# Patient Record
Sex: FEMALE | Race: WHITE | NOT HISPANIC OR LATINO | Employment: FULL TIME | URBAN - METROPOLITAN AREA
[De-identification: names, ages, dates, MRNs, and addresses within clinical notes are randomized per-mention and may not be internally consistent; named-entity substitution may affect disease eponyms.]

---

## 2017-06-11 DIAGNOSIS — F43.22 ADJUSTMENT DISORDER WITH ANXIOUS MOOD: ICD-10-CM

## 2017-06-14 ENCOUNTER — MYC MEDICAL ADVICE (OUTPATIENT)
Dept: FAMILY MEDICINE | Facility: CLINIC | Age: 47
End: 2017-06-14

## 2017-06-14 DIAGNOSIS — F43.22 ADJUSTMENT DISORDER WITH ANXIOUS MOOD: ICD-10-CM

## 2017-07-17 ENCOUNTER — OFFICE VISIT (OUTPATIENT)
Dept: FAMILY MEDICINE | Facility: CLINIC | Age: 47
End: 2017-07-17
Payer: COMMERCIAL

## 2017-07-17 VITALS
BODY MASS INDEX: 26.63 KG/M2 | SYSTOLIC BLOOD PRESSURE: 121 MMHG | TEMPERATURE: 96 F | RESPIRATION RATE: 16 BRPM | DIASTOLIC BLOOD PRESSURE: 79 MMHG | OXYGEN SATURATION: 99 % | WEIGHT: 156 LBS | HEIGHT: 64 IN | HEART RATE: 58 BPM

## 2017-07-17 DIAGNOSIS — F43.22 ADJUSTMENT DISORDER WITH ANXIOUS MOOD: Primary | ICD-10-CM

## 2017-07-17 DIAGNOSIS — Z30.09 ENCOUNTER FOR COUNSELING REGARDING CONTRACEPTION: ICD-10-CM

## 2017-07-17 DIAGNOSIS — M79.604 PAIN OF RIGHT LOWER EXTREMITY DUE TO INJURY: ICD-10-CM

## 2017-07-17 PROCEDURE — 99214 OFFICE O/P EST MOD 30 MIN: CPT | Performed by: NURSE PRACTITIONER

## 2017-07-17 RX ORDER — LEVOTHYROXINE SODIUM 137 UG/1
137 TABLET ORAL DAILY
Qty: 90 TABLET | Refills: 3 | COMMUNITY
Start: 2017-07-17 | End: 2019-06-14

## 2017-07-17 ASSESSMENT — ANXIETY QUESTIONNAIRES
3. WORRYING TOO MUCH ABOUT DIFFERENT THINGS: NOT AT ALL
6. BECOMING EASILY ANNOYED OR IRRITABLE: NOT AT ALL
GAD7 TOTAL SCORE: 1
5. BEING SO RESTLESS THAT IT IS HARD TO SIT STILL: NOT AT ALL
IF YOU CHECKED OFF ANY PROBLEMS ON THIS QUESTIONNAIRE, HOW DIFFICULT HAVE THESE PROBLEMS MADE IT FOR YOU TO DO YOUR WORK, TAKE CARE OF THINGS AT HOME, OR GET ALONG WITH OTHER PEOPLE: NOT DIFFICULT AT ALL
1. FEELING NERVOUS, ANXIOUS, OR ON EDGE: SEVERAL DAYS
7. FEELING AFRAID AS IF SOMETHING AWFUL MIGHT HAPPEN: NOT AT ALL
2. NOT BEING ABLE TO STOP OR CONTROL WORRYING: NOT AT ALL

## 2017-07-17 ASSESSMENT — PATIENT HEALTH QUESTIONNAIRE - PHQ9: 5. POOR APPETITE OR OVEREATING: NOT AT ALL

## 2017-07-17 NOTE — NURSING NOTE
"Chief Complaint   Patient presents with     Anxiety     Recheck Medication     Zoloft       Initial /79  Pulse 58  Temp 96  F (35.6  C) (Tympanic)  Resp 16  Ht 5' 3.75\" (1.619 m)  Wt 156 lb (70.8 kg)  LMP 06/17/2017 (Approximate)  SpO2 99%  BMI 26.99 kg/m2 Estimated body mass index is 26.99 kg/(m^2) as calculated from the following:    Height as of this encounter: 5' 3.75\" (1.619 m).    Weight as of this encounter: 156 lb (70.8 kg).  Medication Reconciliation: complete     Maria E Stern MA      "

## 2017-07-17 NOTE — PROGRESS NOTES
"  SUBJECTIVE:                                                    Belle Tejada is a 46 year old female who presents to clinic today for the following health issues:    Anxiety Follow-Up    Status since last visit: steady    Other associated symptoms:None    Complicating factors:   Significant life event: Yes-  Mother passed away   Current substance abuse: None  Depression symptoms: No  DENI-7 SCORE 1/10/2014 2014 2016   Total Score 0 0 -   Total Score - - 0       GAD7    Amount of exercise or physical activity: 4 days/week for an average of 45 minutes    Problems taking medications regularly: No    Medication side effects: none    Diet: regular (no restrictions)      Divorce was final in 2016.  Her mom  2016.  2 kids: 16 and 12 years old.      Sexually active.  Using condoms.  \"perimenopausal.\"  Periods can be irregular.  Last menstrual period 2017.   Not interested in BCP's.  STD clearance was already done.  She is considering an IUD    Weight.  On weight watchers.  Going well.    Right foot pain.  Started in  after a fall/injury.      Problem list and histories reviewed & adjusted, as indicated.  Additional history: as documented    Patient Active Problem List   Diagnosis     Acquired hypothyroidism     Supervision of high-risk pregnancy of elderly multigravida     Previous  delivery, antepartum condition or complication     CARDIOVASCULAR SCREENING; LDL GOAL LESS THAN 160     Insomnia     Dyspareunia     Orgasm dysfunction     Adjustment disorder with anxious mood     Past Surgical History:   Procedure Laterality Date     C NONSPECIFIC PROCEDURE  2005         C NONSPECIFIC PROCEDURE  10/78    Tonsillectomy       Social History   Substance Use Topics     Smoking status: Never Smoker     Smokeless tobacco: Never Used     Alcohol use Yes      Comment: 5 drinks per week     Family History   Problem Relation Age of Onset     Hypertension Mother      Breast " Cancer Mother      58     Other Cancer Mother      pancreatic CA with mets     Neurologic Disorder Father      migraines + Menieres     Connective Tissue Disorder Father      ichtyosis     Alcohol/Drug Maternal Grandmother      ETOH     Respiratory Maternal Grandfather      emphysema     C.A.D. Paternal Grandmother      Connective Tissue Disorder Son      ichtyosis         Current Outpatient Prescriptions   Medication Sig Dispense Refill     sertraline (ZOLOFT) 50 MG tablet Take 1 tablet (50 mg) by mouth daily 90 tablet 0     levothyroxine (SYNTHROID, LEVOTHROID) 137 MCG tablet Take 1 tablet (137 mcg) by mouth daily 90 tablet 3     desonide (DESOWEN) 0.05 % lotion   1     doxylamine (UNISOM) 25 MG TABS Take 1 tablet (25 mg) by mouth At Bedtime (Patient taking differently: Take 25 mg by mouth PRN) 30 each 11     levothyroxine (SYNTHROID, LEVOTHROID) 125 MCG tablet Take 1 tablet (125 mcg) by mouth daily Taking 137 mcg (Patient not taking: Reported on 7/17/2017) 90 tablet 3     Allergies   Allergen Reactions     No Known Drug Allergies      Recent Labs   Lab Test  08/12/16   0845  05/13/15   1513  10/29/13   1335  05/02/13   1132   LDL  134*  139*   --   102   HDL  73  77   --   84   TRIG  46  47   --   59   ALT   --    --   25   --    CR   --    --   0.80   --    GFRESTIMATED   --    --   78   --    GFRESTBLACK   --    --   >90   --    POTASSIUM   --    --   4.1   --    TSH   --   1.77  2.50   --       BP Readings from Last 3 Encounters:   07/17/17 121/79   08/12/16 116/75   05/13/16 104/78    Wt Readings from Last 3 Encounters:   07/17/17 156 lb (70.8 kg)   08/12/16 160 lb (72.6 kg)   05/13/16 161 lb (73 kg)            Labs reviewed in EPIC    Reviewed and updated as needed this visit by clinical staff       Reviewed and updated as needed this visit by Provider         ROS:  Constitutional, HEENT, cardiovascular, pulmonary, GI, , musculoskeletal, neuro, skin, endocrine and psych systems are negative, except as  "otherwise noted.    OBJECTIVE:     /79  Pulse 58  Temp 96  F (35.6  C) (Tympanic)  Resp 16  Ht 5' 3.75\" (1.619 m)  Wt 156 lb (70.8 kg)  LMP 06/17/2017 (Approximate)  SpO2 99%  BMI 26.99 kg/m2  Body mass index is 26.99 kg/(m^2).   GENERAL APPEARANCE: healthy, alert and no distress. Smiling.   SKIN: warm and dry  PSYCH: mentation appears normal and affect normal/bright.  MS: Ambulatory with a steady gait.   Good eye contact.      ASSESSMENT:   (F43.22) Adjustment disorder with anxious mood  (primary encounter diagnosis)  Comment: Stable  Plan: sertraline (ZOLOFT) 50 MG tablet        Belle is doing quite well on her current medication regimen. Refills are given.  She is due to come into the clinic within the next couple of months for her. Physical with Pap. She will do that.  She will return sooner with any problems.    (Z30.09) Encounter for counseling regarding contraception, IUD  Comment:   Plan: OB/GYN REFERRAL        I discussed with Belle various contraceptive options.  She is leaning toward the intrauterine device.  A referral was given.    (M48.604) Pain of right lower extremity due to injury  Comment:   Plan: PODIATRY/FOOT & ANKLE SURGERY REFERRAL          She will follow-up with podiatry for a consultation other treatment options.      Total: 30 minutes. The patient, greater than 50% of time was spent face to face regarding the 3 above issues, answering questions, and coordinate follow-up care.    STARR Eddy [facility]    "

## 2017-07-17 NOTE — MR AVS SNAPSHOT
After Visit Summary   7/17/2017    Belle Tejada    MRN: [id number]           Patient Information     Date Of Birth          1970        Visit Information        Provider Department      7/17/2017 9:20 AM Pam Rai APRN CNP [facility]        Today's Diagnoses     Adjustment disorder with anxious mood    -  1    Encounter for counseling regarding contraception, IUD        Pain of right lower extremity due to injury           Follow-ups after your visit        Additional Services     OB/GYN REFERRAL       Your provider has referred you to:  G: [facility] [phone]   [email or web address]    Please be aware that coverage of these services is subject to the terms and limitations of your health insurance plan.  Call member services at your health plan with any benefit or coverage questions.      Please bring the following with you to your appointment:    (1) Any X-Rays, CTs or MRIs which have been performed.  Contact the facility where they were done to arrange for  prior to your scheduled appointment.   (2) List of current medications   (3) This referral request   (4) Any documents/labs given to you for this referral            PODIATRY/FOOT & ANKLE SURGERY REFERRAL       Your provider has referred you to: FMG: [facility] [phone]   [email or web address]  [facility]: [facility] [phone]   [email or web address]    Please be aware that coverage of these services is subject to the terms and limitations of your health insurance plan.  Call member services at your health plan with any benefit or coverage questions.      Please bring the following to your appointment:  >>   Any x-rays, CTs or MRIs which have been performed.  Contact the facility where they were done to arrange for  prior to your  "scheduled appointment.    >>   List of current medications   >>   This referral request   >>   Any documents/labs given to you for this referral                  Who to contact     If you have questions or need follow up information about today's clinic visit or your schedule please contact [facility] directly at [phone].  Normal or non-critical lab and imaging results will be communicated to you by MyChart, letter or phone within 4 business days after the clinic has received the results. If you do not hear from us within 7 days, please contact the clinic through Access Systemshart or phone. If you have a critical or abnormal lab result, we will notify you by phone as soon as possible.  Submit refill requests through [employer] or call your pharmacy and they will forward the refill request to us. Please allow 3 business days for your refill to be completed.          Additional Information About Your Visit        MyChart Information     [employer] gives you secure access to your electronic health record. If you see a primary care provider, you can also send messages to your care team and make appointments. If you have questions, please call your primary care clinic.  If you do not have a primary care provider, please call [phone] and they will assist you.        Care EveryWhere ID     This is your Care EveryWhere ID. This could be used by other organizations to access your [place] medical records  [id number]        Your Vitals Were     Pulse Temperature Respirations Height Last Period Pulse Oximetry    58 96  F (35.6  C) (Tympanic) 16 5' 3.75\" (1.619 m) 06/17/2017 (Approximate) 99%    BMI (Body Mass Index)                   26.99 kg/m2            Blood Pressure from Last 3 Encounters:   07/17/17 121/79   08/12/16 116/75   05/13/16 104/78    Weight from Last 3 Encounters:   07/17/17 156 lb (70.8 kg)   08/12/16 160 lb (72.6 kg)   05/13/16 161 lb (73 kg)              We Performed the Following     OB/GYN " REFERRAL     PODIATRY/FOOT & ANKLE SURGERY REFERRAL          Today's Medication Changes          These changes are accurate as of: 7/17/17 10:10 AM.  If you have any questions, ask your nurse or doctor.               These medicines have changed or have updated prescriptions.        Dose/Directions    doxylamine 25 MG Tabs tablet   Commonly known as:  UNISOM   This may have changed:    - when to take this  - additional instructions   Used for:  Insomnia        Dose:  25 mg   Take 1 tablet (25 mg) by mouth At Bedtime   Quantity:  30 each   Refills:  11       * levothyroxine 125 MCG tablet   Commonly known as:  SYNTHROID/LEVOTHROID   This may have changed:  Another medication with the same name was changed. Make sure you understand how and when to take each.   Used for:  Acquired hypothyroidism   Changed by:  Pam Rai APRN CNP        Dose:  125 mcg   Take 1 tablet (125 mcg) by mouth daily Taking 137 mcg   Quantity:  90 tablet   Refills:  3       * levothyroxine 137 MCG tablet   Commonly known as:  SYNTHROID/LEVOTHROID   This may have changed:  additional instructions   Changed by:  Pam Rai APRN CNP        Dose:  137 mcg   Take 1 tablet (137 mcg) by mouth daily . Managed by endocrinology ([facility]).   Quantity:  90 tablet   Refills:  3       * Notice:  This list has 2 medication(s) that are the same as other medications prescribed for you. Read the directions carefully, and ask your doctor or other care provider to review them with you.         Where to get your medicines      These medications were sent to [facility] [id number] - [place], [place] - [address] AT [facility]  [address] [place] [address]     Phone:  [phone]     sertraline 50 MG tablet                Primary Care Provider Office Phone # Fax #    STARR Robert [phone]       [place] [address] [place]  [address]        Equal Access to Services     [facility] : Hadii medardo cedillo candelario Trentali, walizbethda luhueramuha, qacristina genelyndondaniela vo, nola hunt. So [facility] [phone].    ATENCIÓN: Si habla español, tiene a irene disposición servicios gratuitos de asistencia lingüística. Nixon al [phone].    We comply with applicable federal civil rights laws and [place] laws. We do not discriminate on the basis of race, color, national origin, age, disability sex, sexual orientation or gender identity.            Thank you!     Thank you for choosing [facility]  for your care. Our goal is always to provide you with excellent care. Hearing back from our patients is one way we can continue to improve our services. Please take a few minutes to complete the written survey that you may receive in the mail after your visit with us. Thank you!             Your Updated Medication List - Protect others around you: Learn how to safely use, store and throw away your medicines at www.disposemymeds.org.          This list is accurate as of: 7/17/17 10:10 AM.  Always use your most recent med list.                   Brand Name Dispense Instructions for use Diagnosis    desonide 0.05 % lotion    DESOWEN          doxylamine 25 MG Tabs tablet    UNISOM    30 each    Take 1 tablet (25 mg) by mouth At Bedtime    Insomnia       * levothyroxine 125 MCG tablet    SYNTHROID/LEVOTHROID    90 tablet    Take 1 tablet (125 mcg) by mouth daily Taking 137 mcg    Acquired hypothyroidism       * levothyroxine 137 MCG tablet    SYNTHROID/LEVOTHROID    90 tablet    Take 1 tablet (137 mcg) by mouth daily . Managed by endocrinology ([facility]).        sertraline 50 MG tablet    ZOLOFT    90 tablet    Take 1 tablet (50 mg) by mouth daily    Adjustment disorder with anxious mood       * Notice:  This list has 2 medication(s) that are the same as other medications prescribed for you. Read the  directions carefully, and ask your doctor or other care provider to review them with you.

## 2017-07-18 ASSESSMENT — ANXIETY QUESTIONNAIRES: GAD7 TOTAL SCORE: 1

## 2017-07-18 ASSESSMENT — PATIENT HEALTH QUESTIONNAIRE - PHQ9: SUM OF ALL RESPONSES TO PHQ QUESTIONS 1-9: 2

## 2017-07-21 ENCOUNTER — OFFICE VISIT (OUTPATIENT)
Dept: MIDWIFE SERVICES | Facility: CLINIC | Age: 47
End: 2017-07-21
Payer: COMMERCIAL

## 2017-07-21 VITALS
SYSTOLIC BLOOD PRESSURE: 128 MMHG | DIASTOLIC BLOOD PRESSURE: 79 MMHG | HEIGHT: 64 IN | BODY MASS INDEX: 27.13 KG/M2 | WEIGHT: 158.9 LBS | HEART RATE: 60 BPM | TEMPERATURE: 98.1 F

## 2017-07-21 DIAGNOSIS — Z30.430 ENCOUNTER FOR IUD INSERTION: Primary | ICD-10-CM

## 2017-07-21 DIAGNOSIS — Z30.430 ENCOUNTER FOR INSERTION OF INTRAUTERINE CONTRACEPTIVE DEVICE (IUD): ICD-10-CM

## 2017-07-21 LAB — BETA HCG QUAL IFA URINE: NEGATIVE

## 2017-07-21 PROCEDURE — 84703 CHORIONIC GONADOTROPIN ASSAY: CPT | Performed by: ADVANCED PRACTICE MIDWIFE

## 2017-07-21 PROCEDURE — 58300 INSERT INTRAUTERINE DEVICE: CPT | Performed by: ADVANCED PRACTICE MIDWIFE

## 2017-07-21 NOTE — PATIENT INSTRUCTIONS

## 2017-07-21 NOTE — NURSING NOTE
"Chief Complaint   Patient presents with     IUD     Mirena. NDC: [id number] LOT: [id number] EXP: 2020       Initial /79  Pulse 60  Temp 98.1  F (36.7  C) (Oral)  Ht 5' 3.75\" (1.619 m)  Wt 158 lb 14.4 oz (72.1 kg)  LMP 2017 (Approximate)  BMI 27.49 kg/m2 Estimated body mass index is 27.49 kg/(m^2) as calculated from the following:    Height as of this encounter: 5' 3.75\" (1.619 m).    Weight as of this encounter: 158 lb 14.4 oz (72.1 kg).  BP completed using cuff size: regular        The following HM Due: NONE      The following patient reported/Care Every where data was sent to:  P ABSTRACT QUALITY INITIATIVES [[id number]]       patient has appointment for today    Deborah Hughes CMA               "

## 2017-07-21 NOTE — PROGRESS NOTES
Chief Complaint/ History of Present Illness:Belle Tejada is a 46 year old female.   Patient's last menstrual period was 2017 (approximate)..  Today's pregnancy test negative.  She is here for an IUD insertion.  Patient has been given written information.  I have reviewed the risks of the IUD including pregnancy, PID, life threatening infection, perforation, expulsion, cramping, changes in bleeding and ovarian cysts. Benefits of the IUD and alternative family planning methods have been discussed.  Patients questions are answered.  Patient has verbalized understanding of risks and benefits and has signed the consent form.    Allergies   Allergen Reactions     No Known Drug Allergies      Current Outpatient Prescriptions   Medication Sig Dispense Refill     sertraline (ZOLOFT) 50 MG tablet Take 1 tablet (50 mg) by mouth daily 90 tablet 3     levothyroxine (SYNTHROID/LEVOTHROID) 137 MCG tablet Take 1 tablet (137 mcg) by mouth daily . Managed by endocrinology ([facility]). 90 tablet 3     desonide (DESOWEN) 0.05 % lotion   1     doxylamine (UNISOM) 25 MG TABS Take 1 tablet (25 mg) by mouth At Bedtime (Patient taking differently: Take 25 mg by mouth PRN) 30 each 11     [DISCONTINUED] levothyroxine (SYNTHROID, LEVOTHROID) 125 MCG tablet Take 1 tablet (125 mcg) by mouth daily Taking 137 mcg (Patient not taking: Reported on 2017) 90 tablet 3      Past Medical History:   Diagnosis Date     Unspecified hypothyroidism     Hypothyroidism     Past Surgical History:   Procedure Laterality Date     C NONSPECIFIC PROCEDURE  2005         C NONSPECIFIC PROCEDURE  10/78    Tonsillectomy       REVIEW OF SYSTEMS  CONSTITUTIONAL: Denies fever, chills and night sweats  BREASTS:  Denies discharge and lumps.    HEART/LUNGS: Denies dyspnea, wheezing, coughing and chest pain.  HEMATOLOGIC: Denies tendency to bruise and history of blood clots.  GENITOURINARY:  Denies urgency, dysuria and  "hematuria.  NEUROLOGIC:  Denies seizures, weakness and fainting.  PSYCHIATRIC: Negative for changes in mood or affect      EXAM:  /79  Pulse 60  Temp 98.1  F (36.7  C) (Oral)  Ht 5' 3.75\" (1.619 m)  Wt 158 lb 14.4 oz (72.1 kg)  LMP 06/17/2017 (Approximate)  BMI 27.49 kg/m2    Abdomen: soft, nontender, without hepatosplenomegaly or masses  : normal cervix, adnexae, and uterus without masses or discharge  IUD type: Mirena  Lot # [id number]    Procedure:  Uterus assessed for position and is retroverted, cervix very anterior.  Speculum inserted.  Betadine prep of cervix done.  Tenaculum applied at 12 o'clock position and gentle traction was applied to elongate the cervical canal.  Uterus sounded initially to 5.5cm but it didn't feel to get through the internal os. Measured again to 7cm's.  Cervical dilators not used.   IUD inserted in the usual fashion without problems, ie: resistance, severe protracted pain or excessive bleeding.  Tenaculum was removed with scant bleeding from the tenaculum site that was managed with some direct pressure using Millan swabs.  Strings trimmed to 2 cm's.  Patient tolerated the procedure well without any prolonged pain or syncopy.      ASSESSMENT/ PLAN:  (Z30.430) Encounter for IUD insertion  (primary encounter diagnosis)  Comment:   Plan: Beta HCG qual IFA urine, US Pelvic Complete w         Transvaginal    Instructions given to patient regarding checking IUD strings, returning to the clinic if pain or inability to check strings and/or irregular bleeding.  Ultrasound ordered to confirm IUD placement. Pt will schedule today before leaving clinic.  Radha Jama CNM  "

## 2017-07-21 NOTE — MR AVS SNAPSHOT
After Visit Summary   7/21/2017    Belle Tejada    MRN: [id number]           Patient Information     Date Of Birth          1970        Visit Information        Provider Department      7/21/2017 2:00 PM Radha Jama APRN [facility]        Today's Diagnoses     Encounter for IUD insertion    -  1      Care Instructions    What Mirena Users May Expect    What to watch for right after Mirena is placed  Some women may experience uterine cramps, bleeding, and/or dizziness during and right after Mirena is placed. To help minimize the cramps, you may taken ibuprofen 600 mg with food prior to your appointment. These symptoms should improve over the next 24 hours.  Mild cramping may be present for a few days after your placement  As a follow up, you should check your strings on 4 weeks or visit your clinic once in the first 4 to 12 weeks after Mirena is placed to make sure it is in the right position. After that, Mirena can be checked once a year as part of your routine exam.    Please use a back-up method (abstinence or condoms) for 5 days after placement.    Your periods may change  For the first 3 to 6 months, your monthly period may become irregular. You may also have frequent spotting or light bleeding. A few women have heavy bleeding during this time. After your body adjusts, the number of bleeding days is likely to decrease (but may remain irregular), and you may even find that your periods stop altogether for as long as Mirena is in place. Around the end of the third month of use, you may see up to a 75% reduction in the amount of menstrual bleeding. By one year, about 1 out of 5 users may hay have no period at all. At the end of two years, 70% have little or no bleeding. Your periods will return rapidly once Mirena is removed.     Mirena Strings  You may check your own Mirena strings by inserting a finger into the vagina and feeling the strings as they exit the  cervix.  The strings will initially feel firm, like fishing line, but will soften over a few weeks.  After the strings have softened, you or your partner should not be able to feel the strings during intercourse.  If you can feel the IUD, see your healthcare provider to have the position confirmed.  You may use tampons with Mirena in place.    Mirena does not protect against HIV or STDs.  Mirena does not prevent the formation of ovarian cysts.  Mirena does not typically reduce acne or cause weight gain or mood changes.    Please call [facility] at [phone] if you have questions or concerns.    For more information:  [email or web address]            Follow-ups after your visit        Your next 10 appointments already scheduled     Aug 14, 2017  9:20 AM CDT   PHYSICAL with STARR Robert CNP   [facility]    [address] [place] [id number] [phone]            Aug 14, 2017 10:00 AM CDT   New Visit with Fredi Case DPM   [facility]    [address] [place] [address] [phone]              Who to contact     If you have questions or need follow up information about today's clinic visit or your schedule please contact [facility] directly at [phone].  Normal or non-critical lab and imaging results will be communicated to you by MyChart, letter or phone within 4 business days after the clinic has received the results. If you do not hear from us within 7 days, please contact the clinic through MyChart or phone. If you have a critical or abnormal lab result, we will notify you by phone as soon as possible.  Submit refill requests through [employer] or call your pharmacy and they will forward the refill request to us. Please allow 3 business days for your refill to be completed.          Additional Information About Your  "Visit        idemamahart Information     [employer] gives you secure access to your electronic health record. If you see a primary care provider, you can also send messages to your care team and make appointments. If you have questions, please call your primary care clinic.  If you do not have a primary care provider, please call [phone] and they will assist you.        Care EveryWhere ID     This is your Care EveryWhere ID. This could be used by other organizations to access your [facility] medical records  [id number]        Your Vitals Were     Pulse Temperature Height Last Period BMI (Body Mass Index)       60 98.1  F (36.7  C) (Oral) 5' 3.75\" (1.619 m) 06/17/2017 (Approximate) 27.49 kg/m2        Blood Pressure from Last 3 Encounters:   07/21/17 128/79   07/17/17 121/79   08/12/16 116/75    Weight from Last 3 Encounters:   07/21/17 158 lb 14.4 oz (72.1 kg)   07/17/17 156 lb (70.8 kg)   08/12/16 160 lb (72.6 kg)              We Performed the Following     Beta HCG qual IFA urine          Today's Medication Changes          These changes are accurate as of: 7/21/17  2:21 PM.  If you have any questions, ask your nurse or doctor.               These medicines have changed or have updated prescriptions.        Dose/Directions    doxylamine 25 MG Tabs tablet   Commonly known as:  UNISOM   This may have changed:    - when to take this  - additional instructions   Used for:  Insomnia        Dose:  25 mg   Take 1 tablet (25 mg) by mouth At Bedtime   Quantity:  30 each   Refills:  11       levothyroxine 137 MCG tablet   Commonly known as:  SYNTHROID/LEVOTHROID   This may have changed:  Another medication with the same name was removed. Continue taking this medication, and follow the directions you see here.   Changed by:  Pam Rai APRN CNP        Dose:  137 mcg   Take 1 tablet (137 mcg) by mouth daily . Managed by endocrinology ([facility]).   Quantity:  90 tablet   Refills:  3             "    Primary Care Provider Office Phone # Fax #    STARR Robert [phone]       [facility] [address] [place] [address]        Equal Access to Services     DUNIA CONSTANTINO : Hadgilson cedillo hadpano Soomaali, waaxda luqadaha, qaybta kaalmada adeegyada, waxanisa betzaidain hayheavenlyn nathen brewerrhysmirna hunt. So [facility] [phone].    ATENCIÓN: Si habla español, tiene a irene disposición servicios gratuitos de asistencia lingüística. Llame al [phone].    We comply with applicable federal civil rights laws and [place] laws. We do not discriminate on the basis of race, color, national origin, age, disability sex, sexual orientation or gender identity.            Thank you!     Thank you for choosing [facility]  for your care. Our goal is always to provide you with excellent care. Hearing back from our patients is one way we can continue to improve our services. Please take a few minutes to complete the written survey that you may receive in the mail after your visit with us. Thank you!             Your Updated Medication List - Protect others around you: Learn how to safely use, store and throw away your medicines at www.disposemymeds.org.          This list is accurate as of: 7/21/17  2:21 PM.  Always use your most recent med list.                   Brand Name Dispense Instructions for use Diagnosis    desonide 0.05 % lotion    DESOWEN          doxylamine 25 MG Tabs tablet    UNISOM    30 each    Take 1 tablet (25 mg) by mouth At Bedtime    Insomnia       levothyroxine 137 MCG tablet    SYNTHROID/LEVOTHROID    90 tablet    Take 1 tablet (137 mcg) by mouth daily . Managed by endocrinology ([facility]).        sertraline 50 MG tablet    ZOLOFT    90 tablet    Take 1 tablet (50 mg) by mouth daily    Adjustment disorder with anxious mood

## 2017-07-24 ENCOUNTER — RADIANT APPOINTMENT (OUTPATIENT)
Dept: ULTRASOUND IMAGING | Facility: CLINIC | Age: 47
End: 2017-07-24
Attending: ADVANCED PRACTICE MIDWIFE
Payer: COMMERCIAL

## 2017-07-24 ENCOUNTER — OFFICE VISIT (OUTPATIENT)
Dept: MIDWIFE SERVICES | Facility: CLINIC | Age: 47
End: 2017-07-24
Payer: COMMERCIAL

## 2017-07-24 VITALS
DIASTOLIC BLOOD PRESSURE: 73 MMHG | SYSTOLIC BLOOD PRESSURE: 108 MMHG | BODY MASS INDEX: 26.81 KG/M2 | WEIGHT: 155 LBS | TEMPERATURE: 99 F | HEART RATE: 72 BPM

## 2017-07-24 DIAGNOSIS — Z30.431 IUD CHECK UP: Primary | ICD-10-CM

## 2017-07-24 DIAGNOSIS — Z30.430 ENCOUNTER FOR IUD INSERTION: ICD-10-CM

## 2017-07-24 PROCEDURE — 76830 TRANSVAGINAL US NON-OB: CPT | Performed by: OBSTETRICS & GYNECOLOGY

## 2017-07-24 PROCEDURE — 99213 OFFICE O/P EST LOW 20 MIN: CPT | Performed by: ADVANCED PRACTICE MIDWIFE

## 2017-07-24 NOTE — MR AVS SNAPSHOT
After Visit Summary   7/24/2017    Belle Tejada    MRN: [id number]           Patient Information     Date Of Birth          1970        Visit Information        Provider Department      7/24/2017 5:30 PM Lorenza Chiu APRN CNM [facility]        Today's Diagnoses     IUD check up    -  1       Follow-ups after your visit        Your next 10 appointments already scheduled     Aug 14, 2017  9:20 AM CDT   PHYSICAL with STARR Robert CNP   [facility]    [address] [place] [address]   [phone]            Aug 14, 2017 10:00 AM CDT   New Visit with Fredi Case DPM   [address] [id number] [phone]              Who to contact     If you have questions or need follow up information about today's clinic visit or your schedule please contact [facility] directly at [phone].  Normal or non-critical lab and imaging results will be communicated to you by [employer], letter or phone within 4 business days after the clinic has received the results. If you do not hear from us within 7 days, please contact the clinic through [facility] [employer] or phone. If you have a critical or abnormal lab result, we will notify you by phone as soon as possible.  Submit refill requests through [employer] or call your pharmacy and they will forward the refill request to us. Please allow 3 business days for your refill to be completed.          Additional Information About Your Visit        [employer] Information     [employer] gives you secure access to your electronic health record. If you see a primary care provider, you can also send messages to your care team and make appointments. If you have questions, please call your primary care clinic.  If you do not have a primary care provider, please call [phone] and they will  assist you.        Care EveryWhere ID     This is your Care EveryWhere ID. This could be used by other organizations to access your [facility] medical records  [id number]        Your Vitals Were     Pulse Temperature Last Period BMI (Body Mass Index)          72 99  F (37.2  C) (Oral) 06/17/2017 (Approximate) 26.81 kg/m2         Blood Pressure from Last 3 Encounters:   07/24/17 108/73   07/21/17 128/79   07/17/17 121/79    Weight from Last 3 Encounters:   07/24/17 155 lb (70.3 kg)   07/21/17 158 lb 14.4 oz (72.1 kg)   07/17/17 156 lb (70.8 kg)              Today, you had the following     No orders found for display         Today's Medication Changes          These changes are accurate as of: 7/24/17  6:20 PM.  If you have any questions, ask your nurse or doctor.               These medicines have changed or have updated prescriptions.        Dose/Directions    doxylamine 25 MG Tabs tablet   Commonly known as:  UNISOM   This may have changed:    - when to take this  - additional instructions   Used for:  Insomnia        Dose:  25 mg   Take 1 tablet (25 mg) by mouth At Bedtime   Quantity:  30 each   Refills:  11                Primary Care Provider Office Phone # Fax #    STARR Robert [facility] [phone]       [facility] [address] [place] [address]        Equal Access to Services     DUNIA CONSTANTINO AH: Hadii medardo palomino Soramakrishna, waaxda luqadaha, qaybta kaalmada ademaria e, nola howard . So [facility] [phone].    ATENCIÓN: Si habla español, tiene a irene disposición servicios gratuitos de asistencia lingüística. Nixon al [phone].    We comply with applicable federal civil rights laws and [place] laws. We do not discriminate on the basis of race, color, national origin, age, disability sex, sexual orientation or gender identity.            Thank you!     Thank you for choosing [facility]  for your care. Our goal is  always to provide you with excellent care. Hearing back from our patients is one way we can continue to improve our services. Please take a few minutes to complete the written survey that you may receive in the mail after your visit with us. Thank you!             Your Updated Medication List - Protect others around you: Learn how to safely use, store and throw away your medicines at www.disposemymeds.org.          This list is accurate as of: 7/24/17  6:20 PM.  Always use your most recent med list.                   Brand Name Dispense Instructions for use Diagnosis    desonide 0.05 % lotion    DESOWEN          doxylamine 25 MG Tabs tablet    UNISOM    30 each    Take 1 tablet (25 mg) by mouth At Bedtime    Insomnia       levothyroxine 137 MCG tablet    SYNTHROID/LEVOTHROID    90 tablet    Take 1 tablet (137 mcg) by mouth daily . Managed by endocrinology ([facility]).        sertraline 50 MG tablet    ZOLOFT    90 tablet    Take 1 tablet (50 mg) by mouth daily    Adjustment disorder with anxious mood

## 2017-07-24 NOTE — NURSING NOTE
"Chief Complaint   Patient presents with     Prenatal Care       Initial /73  Pulse 72  Temp 99  F (37.2  C) (Oral)  Wt 155 lb (70.3 kg)  LMP 2017 (Approximate)  BMI 26.81 kg/m2 Estimated body mass index is 26.81 kg/(m^2) as calculated from the following:    Height as of 17: 5' 3.75\" (1.619 m).    Weight as of this encounter: 155 lb (70.3 kg).  BP completed using cuff size: regular        The following HM Due: NONE      The following patient reported/Care Every where data was sent to:  P ABSTRACT QUALITY INITIATIVES [27649]       N/a      Angi Long MA               "

## 2017-07-24 NOTE — PROGRESS NOTES
Belle Tejada 46 year old Patient's last menstrual period was 2017 (approximate).      CC: F/U ultrasound for IUD placement    Pt had IUD placed on 2017.  U/S today appears IUD in correct place, also discussed incidental finding of a small fibroid and a small physiologic cyst.  Pt denies pain or discomfort.    ASSESSMENT:  IUD in correct place    PLAN:  RTC for annual sooner with concerns    This was a counseling appt, > 50% of time spent discussing U/S findings.  Time spent with pt 15 mins.    Lorenza Chiu CNM

## 2017-08-14 ENCOUNTER — RADIANT APPOINTMENT (OUTPATIENT)
Dept: GENERAL RADIOLOGY | Facility: CLINIC | Age: 47
End: 2017-08-14
Attending: PODIATRIST
Payer: COMMERCIAL

## 2017-08-14 ENCOUNTER — OFFICE VISIT (OUTPATIENT)
Dept: FAMILY MEDICINE | Facility: CLINIC | Age: 47
End: 2017-08-14
Payer: COMMERCIAL

## 2017-08-14 ENCOUNTER — OFFICE VISIT (OUTPATIENT)
Dept: PODIATRY | Facility: CLINIC | Age: 47
End: 2017-08-14
Payer: COMMERCIAL

## 2017-08-14 VITALS
WEIGHT: 159 LBS | DIASTOLIC BLOOD PRESSURE: 68 MMHG | SYSTOLIC BLOOD PRESSURE: 104 MMHG | TEMPERATURE: 98.4 F | RESPIRATION RATE: 16 BRPM | OXYGEN SATURATION: 98 % | HEART RATE: 60 BPM | BODY MASS INDEX: 27.51 KG/M2

## 2017-08-14 VITALS
HEIGHT: 64 IN | SYSTOLIC BLOOD PRESSURE: 104 MMHG | BODY MASS INDEX: 27.14 KG/M2 | HEART RATE: 60 BPM | OXYGEN SATURATION: 98 % | DIASTOLIC BLOOD PRESSURE: 68 MMHG | RESPIRATION RATE: 16 BRPM | WEIGHT: 159 LBS

## 2017-08-14 DIAGNOSIS — M20.5X1 HALLUX LIMITUS, RIGHT: Primary | ICD-10-CM

## 2017-08-14 DIAGNOSIS — E03.9 ACQUIRED HYPOTHYROIDISM: ICD-10-CM

## 2017-08-14 DIAGNOSIS — Z01.419 ENCOUNTER FOR GYNECOLOGICAL EXAMINATION WITHOUT ABNORMAL FINDING: Primary | ICD-10-CM

## 2017-08-14 PROCEDURE — 73630 X-RAY EXAM OF FOOT: CPT | Mod: RT

## 2017-08-14 PROCEDURE — 99203 OFFICE O/P NEW LOW 30 MIN: CPT | Performed by: PODIATRIST

## 2017-08-14 PROCEDURE — 99396 PREV VISIT EST AGE 40-64: CPT | Performed by: NURSE PRACTITIONER

## 2017-08-14 NOTE — PROGRESS NOTES
PATIENT HISTORY:  Belle Tejada is a 46 year old female who presents to clinic for R 1st MPJ pain with activity.  Pt reports stubbing the toe and injuring after a jump 8 yrs ago in [place].  Chiropractic care has helped.  Pt is unsure if the toe was broken.  -7/10 pain.  No recent injury.    Review of Systems:  Patient denies fever, chills, rash, wound, stiffness, limping, numbness, weakness, heart burn, blood in stool, chest pain with activity, calf pain when walking, shortness of breath with activity, chronic cough, easy bleeding/bruising, swelling of ankles, excessive thirst, fatigue, depression, anxiety.       PAST MEDICAL HISTORY:   Past Medical History:   Diagnosis Date     Unspecified hypothyroidism     Hypothyroidism        PAST SURGICAL HISTORY:   Past Surgical History:   Procedure Laterality Date     C NONSPECIFIC PROCEDURE  2005         C NONSPECIFIC PROCEDURE  10/78    Tonsillectomy        MEDICATIONS:   Current Outpatient Prescriptions:      levonorgestrel (MIRENA, 52 MG,) 20 MCG/24HR IUD, 1 each by Intrauterine route once, Disp: , Rfl:      sertraline (ZOLOFT) 50 MG tablet, Take 1 tablet (50 mg) by mouth daily, Disp: 90 tablet, Rfl: 3     levothyroxine (SYNTHROID/LEVOTHROID) 137 MCG tablet, Take 1 tablet (137 mcg) by mouth daily . Managed by endocrinology ([facility]")., Disp: 90 tablet, Rfl: 3     desonide (DESOWEN) 0.05 % lotion, , Disp: , Rfl: 1     doxylamine (UNISOM) 25 MG TABS, Take 1 tablet (25 mg) by mouth At Bedtime (Patient taking differently: Take 25 mg by mouth PRN), Disp: 30 each, Rfl: 11     ALLERGIES:    Allergies   Allergen Reactions     No Known Drug Allergies         SOCIAL HISTORY:   Social History     Social History     Marital status:      Spouse name: N/A     Number of children: N/A     Years of education: N/A     Occupational History     Not on file.     Social History Main Topics     Smoking status: Never Smoker     Smokeless tobacco:  Never Used     Alcohol use Yes      Comment: 5 drinks per week     Drug use: No     Sexual activity: Yes     Partners: Male     Birth control/ protection: Rhythm     Other Topics Concern      Service No     Blood Transfusions No     Caffeine Concern No     Occupational Exposure No     Hobby Hazards No     Sleep Concern No     Stress Concern No     Weight Concern No     Special Diet No     Back Care No     Exercise Yes     sporadic     Bike Helmet Yes     Seat Belt Yes     Self-Exams Yes     EOM     Parent/Sibling W/ Cabg, Mi Or Angioplasty Before 65f 55m? No     Social History Narrative    Caffeine intake/servings daily - 6    Calcium intake/servings daily - 4    Exercise 3 times weekly - describe biking, walking, jogging    Sunscreen used - No    Seatbelts used - Yes    Guns stored in the home - No    Self Breast Exam - No    Pap test up to date -  Yes    Eye exam up to date -  Yes    Dental exam up to date -  Yes    DEXA scan up to date -  Not Applicable    Flex Sig/Colonoscopy up to date -  Not Applicable    Mammography up to date -  Not Applicable    Immunizations reviewed and up to date - Yes    Abuse: Current or Past (Physical, Sexual or Emotional) - No    Do you feel safe in your environment - Yes    Do you cope well with stress - Yes    Do you suffer from insomnia - Yes-uses tylenlol pm or unisom    Last updated by: Joanne Gilligan 8/29/08        FAMILY HISTORY:   Family History   Problem Relation Age of Onset     Hypertension Mother      Breast Cancer Mother      58     Other Cancer Mother      pancreatic CA with mets     Neurologic Disorder Father      migraines + Menieres     Connective Tissue Disorder Father      ichtyosis     Alcohol/Drug Maternal Grandmother      ETOH     Respiratory Maternal Grandfather      emphysema     C.A.D. Paternal Grandmother      Other - See Comments Son      ichthyosis        EXAM:Vitals: /68  Pulse 60  Temp 98.4  F (36.9  C) (Tympanic)  Resp 16  Wt 159 lb  (72.1 kg)  LMP 08/03/2017  SpO2 98%  BMI 27.51 kg/m2  BMI= Body mass index is 27.51 kg/(m^2).    General appearance: Patient is alert and fully cooperative with history & exam.  No sign of distress is noted during the visit.     Psychiatric: Affect is pleasant & appropriate.  Patient appears motivated to improve health.     Respiratory: Breathing is regular & unlabored while sitting.     HEENT: Hearing is intact to spoken word.  Speech is clear.  No gross evidence of visual impairment that would impact ambulation.     Dermatologic: Skin is intact to both lower extremities without significant lesions, rash or abrasion.  No paronychia or evidence of soft tissue infection is noted.     Vascular: DP & PT pulses are intact & regular bilaterally.  No significant edema or varicosities noted.  CFT and skin temperature are normal to both lower extremities.     Neurologic: Lower extremity sensation is intact to light touch.  No evidence of weakness or contracture in the lower extremities.  No evidence of neuropathy.     Musculoskeletal: R 1st MPJ ROM limited with dorsal spurring noted.  Joint pain with end dorsiflexion.  Patient is ambulatory without assistive device or brace.  No gross ankle deformity noted.  No foot or ankle joint effusion is noted.    XRs of R foot reviewed with pt.  1st MPJ narrowing, spurring, loose body.     ASSESSMENT: R hallux limitus     PLAN:  Reviewed patient's chart in epic.  Discussed condition and treatment options including pros and cons.    Surgical and non-surgical treatment options for hallux limitus were discussed.  This includes my philosophy about different procedures including cheilectomy, osteotomy and fusion.  I explained how fusion is for late stage treatment of advanced arthritis.  There is no certainty that the non-fusion procedures will improve joint pain that is caused by arthritis.  I explained that hallux limitus is oftentimes surgically treated in a staged manner.  This  means that additional surgery may be necessary over time.  Non-operative treatments like stiff soles, orthotics, injection and NSAIDs were discussed.  Shoes that provide extra room for the enlarged joint should be helpful.  I would anticipate somewhat short term benefit from joint injection.    Risks and potential complications of treatment were discussed including side effects from joint injection, limited benefit from orthotics due to the enlarged joint and the progressive nature of this condition.      Pt will proceed with nonoperative measures for now.  Handout given.  F/u prn.      Fredi Case DPM, FACFAS    Weight management plan: Patient was referred to their PCP to discuss a diet and exercise plan.

## 2017-08-14 NOTE — NURSING NOTE
"Chief Complaint   Patient presents with     Musculoskeletal Problem     right foot       Initial /68  Pulse 60  Temp 98.4  F (36.9  C) (Tympanic)  Resp 16  Wt 159 lb (72.1 kg)  LMP 08/03/2017  SpO2 98%  BMI 27.51 kg/m2 Estimated body mass index is 27.51 kg/(m^2) as calculated from the following:    Height as of an earlier encounter on 8/14/17: 5' 3.75\" (1.619 m).    Weight as of this encounter: 159 lb (72.1 kg).  Medication Reconciliation: unable or not appropriate to perform       Katalina Stern MA      "

## 2017-08-14 NOTE — PROGRESS NOTES
SUBJECTIVE:   CC: Belle Tejada is an 46 year old woman who presents for preventive health visit.     Physical   Annual:     Getting at least 3 servings of Calcium per day::  Yes    Bi-annual eye exam::  Yes    Dental care twice a year::  Yes    Sleep apnea or symptoms of sleep apnea::  None    Diet::  Regular (no restrictions)    Frequency of exercise::  2-3 days/week    Duration of exercise::  45-60 minutes    Taking medications regularly::  Yes    Medication side effects::  None    Additional concerns today::  No        Today's PHQ-2 Score:   PHQ-2 ( 1999 [employer]) 8/14/2017   Q1: Little interest or pleasure in doing things 0   Q2: Feeling down, depressed or hopeless 0   PHQ-2 Score 0   Q1: Little interest or pleasure in doing things Not at all   Q2: Feeling down, depressed or hopeless Not at all   PHQ-2 Score 0       Abuse: Current or Past(Physical, Sexual or Emotional)- No  Do you feel safe in your environment - Yes    Social History   Substance Use Topics     Smoking status: Never Smoker     Smokeless tobacco: Never Used     Alcohol use Yes      Comment: 5 drinks per week          Standardized Alcohol Screening Questionnaire  AUDIT   Questions 0 1 2 3 4 Score   1. How often do you have a drink  containing alcohol? Never Monthly or less 2 to 4  times a  month 2 to 3  times a  week 4 or more  times a  week  4   2. How many drinks containing alcohol  do you have on a typical day when you are drinking? 1 or 2 3 or 4 5 or 6 7 to 9 10 or more  0   3. How often do you have more than five  or more drinks on one occasion? Never Less  than  monthly Monthly Weekly Daily or  almost  daily  0   4. How often during the last year have  you found that you were not able to stop drinking once you had started? Never Less  than  monthly Monthly Weekly Daily or  almost  daily  0   5. How often during the last year have  you failed to do what was normally expected of you because of drinking? Never Less  than  monthly Monthly  Weekly Daily or  almost  daily  0   6. How often during the last year have  you needed a first drink in the morning to get yourself going after a heavy drinking session? Never Less  than  monthly Monthly Weekly Daily or  almost  daily  0   7. How often during the last year have you had a feeling of guilt or remorse after drinking? Never Less  than  monthly Monthly Weekly Daily or  almost  daily  0   8. How often during the last year have  you been unable to remember what happened the night before because of your drinking? Never Less  than  monthly Monthly Weekly Daily or  almost  daily  0   9. Have you or someone else been  injured because of your drinking? No  Yes, but not in the last year  Yes,  during the  last year  0   10. Has a relative, friend, doctor or other health care worker been concerned about your drinking or suggested you cut down? No  Yes, but not in the last year  Yes,  during the  last year  0   Total  4   Scoring: A score of 7 for adult men is an indication of hazardous drinking (risk for physical or physiological harm); a score of 8 or more is an indication of an alcohol use disorder. A score of 5 or more for adult women  is an indication of hazardous drinking or an alcohol use disorder.         Reviewed orders with patient.  Reviewed health maintenance and updated orders accordingly - Yes  Labs reviewed in EPIC  BP Readings from Last 3 Encounters:   17 104/68   17 108/73   17 128/79    Wt Readings from Last 3 Encounters:   17 159 lb (72.1 kg)   17 155 lb (70.3 kg)   17 158 lb 14.4 oz (72.1 kg)                  Patient Active Problem List   Diagnosis     Acquired hypothyroidism     CARDIOVASCULAR SCREENING; LDL GOAL LESS THAN 160     Insomnia     Dyspareunia     Orgasm dysfunction     Adjustment disorder with anxious mood     Past Surgical History:   Procedure Laterality Date     C NONSPECIFIC PROCEDURE  2005         C NONSPECIFIC PROCEDURE   10/78    Tonsillectomy       Social History   Substance Use Topics     Smoking status: Never Smoker     Smokeless tobacco: Never Used     Alcohol use Yes      Comment: 5 drinks per week     Family History   Problem Relation Age of Onset     Hypertension Mother      Breast Cancer Mother      58     Other Cancer Mother      pancreatic CA with mets     Neurologic Disorder Father      migraines + Menieres     Connective Tissue Disorder Father      ichtyosis     Alcohol/Drug Maternal Grandmother      ETOH     Respiratory Maternal Grandfather      emphysema     C.A.D. Paternal Grandmother      Connective Tissue Disorder Son      ichtyosis         Current Outpatient Prescriptions   Medication Sig Dispense Refill     levonorgestrel (MIRENA, 52 MG,) 20 MCG/24HR IUD 1 each by Intrauterine route once       sertraline (ZOLOFT) 50 MG tablet Take 1 tablet (50 mg) by mouth daily 90 tablet 3     levothyroxine (SYNTHROID/LEVOTHROID) 137 MCG tablet Take 1 tablet (137 mcg) by mouth daily . Managed by endocrinology ([facility]). 90 tablet 3     desonide (DESOWEN) 0.05 % lotion   1     doxylamine (UNISOM) 25 MG TABS Take 1 tablet (25 mg) by mouth At Bedtime (Patient taking differently: Take 25 mg by mouth PRN) 30 each 11     Allergies   Allergen Reactions     No Known Drug Allergies      Recent Labs   Lab Test  08/12/16   0845  05/13/15   1513  10/29/13   1335  05/02/13   1132   LDL  134*  139*   --   102   HDL  73  77   --   84   TRIG  46  47   --   59   ALT   --    --   25   --    CR   --    --   0.80   --    GFRESTIMATED   --    --   78   --    GFRESTBLACK   --    --   >90   --    POTASSIUM   --    --   4.1   --    TSH   --   1.77  2.50   --               Patient under age 50, mutual decision reflected in health maintenance.        Pertinent mammograms are reviewed under the imaging tab.  History of abnormal Pap smear:   Last 3 Pap Results:   PAP (no units)   Date Value   08/12/2016 OTHER-NIL, See Result  "  05/02/2013 NIL   08/29/2008 NIL     Mirena was inserted  Reviewed and updated as needed this visit by clinical staff  Tobacco  Allergies  Med Hx  Surg Hx  Fam Hx  Soc Hx        Reviewed and updated as needed this visit by Provider          Thyroid:  Taking her meds as prescribed.  Requesting labs for follow up today.     ROS:  C: NEGATIVE for fever, chills, change in weight  I: NEGATIVE for worrisome rashes, moles or lesions  E: NEGATIVE for vision changes or irritation  ENT: NEGATIVE for ear, mouth and throat problems  R: NEGATIVE for significant cough or SOB  B: NEGATIVE for masses, tenderness or discharge  CV: NEGATIVE for chest pain, palpitations or peripheral edema  GI: NEGATIVE for nausea, abdominal pain, heartburn, or change in bowel habits  : NEGATIVE for unusual urinary or vaginal symptoms. Periods are irregular. See HPI.  M: NEGATIVE for significant arthralgias or myalgia  N: NEGATIVE for weakness, dizziness or paresthesias  E: NEGATIVE for temperature intolerance, skin/hair changes  P: NEGATIVE for changes in mood or affect     OBJECTIVE:   /68  Pulse 60  Resp 16  Ht 5' 3.75\" (1.619 m)  Wt 159 lb (72.1 kg)  LMP 08/03/2017  SpO2 98%  Breastfeeding? No  BMI 27.51 kg/m2  EXAM:  GENERAL: healthy, alert and no distress  EYES: Eyes grossly normal to inspection, PERRL and conjunctivae and sclerae normal  HENT: ear canals and TM's normal, nose and mouth without ulcers or lesions  NECK: no adenopathy, no asymmetry, masses, or scars and thyroid normal to palpation  RESP: lungs clear to auscultation - no rales, rhonchi or wheezes  BREAST: normal without masses, tenderness or nipple discharge and no palpable axillary masses or adenopathy  CV: regular rate and rhythm, normal S1 S2, no S3 or S4, no murmur, click or rub, no peripheral edema and peripheral pulses strong  ABDOMEN: soft, nontender, no hepatosplenomegaly, no masses and bowel sounds normal   (female): normal female external " "genitalia, normal urethral meatus, vaginal mucosa pink, moist, well rugated, and normal cervix/adnexa/uterus without masses or discharge  MS: no gross musculoskeletal defects noted, no edema  SKIN: no suspicious lesions or rashes  NEURO: Normal strength and tone, mentation intact and speech normal  PSYCH: mentation appears normal, affect normal/bright    ASSESSMENT/PLAN:   (Z01.419) Encounter for gynecological examination without abnormal finding  (primary encounter diagnosis)  Comment:   Plan: Lipid panel reflex to direct LDL, Hemoglobin,         Glucose, CANCELED: Lipid panel reflex to direct        LDL, CANCELED: Hemoglobin, CANCELED: Glucose            (E03.9) Acquired hypothyroidism  Comment:   Plan: **TSH with free T4 reflex FUTURE 1yr, CANCELED:        TSH WITH FREE T4 REFLEX        Labs pending.       COUNSELING:  Reviewed preventive health counseling, as reflected in patient instructions     reports that she has never smoked. She has never used smokeless tobacco.    Estimated body mass index is 27.51 kg/(m^2) as calculated from the following:    Height as of this encounter: 5' 3.75\" (1.619 m).    Weight as of this encounter: 159 lb (72.1 kg).         Counseling Resources:  ATP IV Guidelines  Pooled Cohorts Equation Calculator  Breast Cancer Risk Calculator  FRAX Risk Assessment  ICSI Preventive Guidelines  Dietary Guidelines for Americans, 2010  USDA's MyPlate  ASA Prophylaxis  Lung CA Screening    STARR Eddy CNP  [facility] for HPI/ROS submitted by the patient on 8/14/2017   PHQ-2 Score: 0    "

## 2017-08-14 NOTE — MR AVS SNAPSHOT
After Visit Summary   8/14/2017    Belle Tejada    MRN: [id number]           Patient Information     Date Of Birth          1970        Visit Information        Provider Department      8/14/2017 9:20 AM Pam Rai APRN [facility]        Today's Diagnoses     Encounter for gynecological examination without abnormal finding    -  1    Acquired hypothyroidism          Care Instructions      Preventive Health Recommendations  Female Ages 40 to 49    Yearly exam:     See your health care provider every year in order to  1. Review health changes.   2. Discuss preventive care.    3. Review your medicines if your doctor prescribed any.      Get a Pap test every three years (unless you have an abnormal result and your provider advises testing more often).      If you get Pap tests with HPV test, you only need to test every 5 years, unless you have an abnormal result. You do not need a Pap test if your uterus was removed (hysterectomy) and you have not had cancer.      You should be tested each year for STDs (sexually transmitted diseases), if you're at risk.       Ask your doctor if you should have a mammogram.      Have a colonoscopy (test for colon cancer) if someone in your family has had colon cancer or polyps before age 50.       Have a cholesterol test every 5 years.       Have a diabetes test (fasting glucose) after age 45. If you are at risk for diabetes, you should have this test every 3 years.    Shots: Get a flu shot each year. Get a tetanus shot every 10 years.     Nutrition:     Eat at least 5 servings of fruits and vegetables each day.    Eat whole-grain bread, whole-wheat pasta and brown rice instead of white grains and rice.    Talk to your provider about Calcium and Vitamin D.     Lifestyle    Exercise at least 150 minutes a week (an average of 30 minutes a day, 5 days a week). This will help you control your weight and prevent disease.    Limit  "alcohol to one drink per day.    No smoking.     Wear sunscreen to prevent skin cancer.    See your dentist every six months for an exam and cleaning.          Follow-ups after your visit        Your next 10 appointments already scheduled     Aug 14, 2017 10:00 AM CDT   New Visit with Fredi Case DPM   [facility] ([facility])    [address] [address] [phone]              Who to contact     If you have questions or need follow up information about today's clinic visit or your schedule please contact [facility] directly at [phone].  Normal or non-critical lab and imaging results will be communicated to you by MyChart, letter or phone within 4 business days after the clinic has received the results. If you do not hear from us within 7 days, please contact the clinic through [employer] or phone. If you have a critical or abnormal lab result, we will notify you by phone as soon as possible.  Submit refill requests through [employer] or call your pharmacy and they will forward the refill request to us. Please allow 3 business days for your refill to be completed.          Additional Information About Your Visit        MyChart Information     [employer] gives you secure access to your electronic health record. If you see a primary care provider, you can also send messages to your care team and make appointments. If you have questions, please call your primary care clinic.  If you do not have a primary care provider, please call [phone] and they will assist you.        Care EveryWhere ID     This is your Care EveryWhere ID. This could be used by other organizations to access your [place] medical records  [id number]        Your Vitals Were     Pulse Respirations Height Last Period Pulse Oximetry Breastfeeding?    60 16 5' 3.75\" (1.619 m) 08/03/2017 98% No    BMI (Body Mass Index)                   27.51 kg/m2            Blood " Pressure from Last 3 Encounters:   08/14/17 104/68   07/24/17 108/73   07/21/17 128/79    Weight from Last 3 Encounters:   08/14/17 159 lb (72.1 kg)   07/24/17 155 lb (70.3 kg)   07/21/17 158 lb 14.4 oz (72.1 kg)              We Performed the Following     Glucose     Hemoglobin     Lipid panel reflex to direct LDL     TSH WITH FREE T4 REFLEX          Today's Medication Changes          These changes are accurate as of: 8/14/17  9:47 AM.  If you have any questions, ask your nurse or doctor.               These medicines have changed or have updated prescriptions.        Dose/Directions    doxylamine 25 MG Tabs tablet   Commonly known as:  UNISOM   This may have changed:    - when to take this  - additional instructions   Used for:  Insomnia        Dose:  25 mg   Take 1 tablet (25 mg) by mouth At Bedtime   Quantity:  30 each   Refills:  11                Primary Care Provider Office Phone # Fax #    STARR Robert [facility] [phone] [address] [place] [address]        Equal Access to Services     [facility] Center[facility] : Hadii medardo ku hadasho Soomaali, waaxda luqadaha, qaybta kaalmada adeegyadaniela, nola howard . So [facility] [phone].    ATENCIÓN: Si habla español, tiene a irene disposición servicios gratuitos de asistencia lingüística. [facility] [phone].    We comply with applicable federal civil rights laws and [place] laws. We do not discriminate on the basis of race, color, national origin, age, disability sex, sexual orientation or gender identity.            Thank you!     Thank you for choosing [facility]  for your care. Our goal is always to provide you with excellent care. Hearing back from our patients is one way we can continue to improve our services. Please take a few minutes to complete the written survey that you may receive in the mail after your visit with us. Thank you!             Your Updated Medication List -  Protect others around you: Learn how to safely use, store and throw away your medicines at www.disposemymeds.org.          This list is accurate as of: 8/14/17  9:47 AM.  Always use your most recent med list.                   Brand Name Dispense Instructions for use Diagnosis    desonide 0.05 % lotion    DESOWEN          doxylamine 25 MG Tabs tablet    UNISOM    30 each    Take 1 tablet (25 mg) by mouth At Bedtime    Insomnia       levothyroxine 137 MCG tablet    SYNTHROID/LEVOTHROID    90 tablet    Take 1 tablet (137 mcg) by mouth daily . Managed by endocrinology ([facility]).        MIRENA (52 MG) 20 MCG/24HR IUD   Generic drug:  levonorgestrel      1 each by Intrauterine route once        sertraline 50 MG tablet    ZOLOFT    90 tablet    Take 1 tablet (50 mg) by mouth daily    Adjustment disorder with anxious mood

## 2017-08-14 NOTE — LETTER
2017         RE: Belle Tejada  [address]  [place] [address]        Dear Colleague,    Thank you for referring your patient, Belle Tejada, to the [facility]. Please see a copy of my visit note below.    PATIENT HISTORY:  Belle Tejada is a 46 year old female who presents to clinic for R 1st MPJ pain with activity.  Pt reports stubbing the toe and injuring after a jump 8 yrs ago in [place].  Chiropractic care has helped.  Pt is unsure if the toe was broken.  -7/10 pain.  No recent injury.    Review of Systems:  Patient denies fever, chills, rash, wound, stiffness, limping, numbness, weakness, heart burn, blood in stool, chest pain with activity, calf pain when walking, shortness of breath with activity, chronic cough, easy bleeding/bruising, swelling of ankles, excessive thirst, fatigue, depression, anxiety.       PAST MEDICAL HISTORY:   Past Medical History:   Diagnosis Date     Unspecified hypothyroidism     Hypothyroidism        PAST SURGICAL HISTORY:   Past Surgical History:   Procedure Laterality Date     C NONSPECIFIC PROCEDURE  2005         C NONSPECIFIC PROCEDURE  10/78    Tonsillectomy        MEDICATIONS:   Current Outpatient Prescriptions:      levonorgestrel (MIRENA, 52 MG,) 20 MCG/24HR IUD, 1 each by Intrauterine route once, Disp: , Rfl:      sertraline (ZOLOFT) 50 MG tablet, Take 1 tablet (50 mg) by mouth daily, Disp: 90 tablet, Rfl: 3     levothyroxine (SYNTHROID/LEVOTHROID) 137 MCG tablet, Take 1 tablet (137 mcg) by mouth daily . Managed by endocrinology ([facility])., Disp: 90 tablet, Rfl: 3     desonide (DESOWEN) 0.05 % lotion, , Disp: , Rfl: 1     doxylamine (UNISOM) 25 MG TABS, Take 1 tablet (25 mg) by mouth At Bedtime (Patient taking differently: Take 25 mg by mouth PRN), Disp: 30 each, Rfl: 11     ALLERGIES:    Allergies   Allergen Reactions     No Known Drug Allergies         SOCIAL HISTORY:   Social History      Social History     Marital status:      Spouse name: N/A     Number of children: N/A     Years of education: N/A     Occupational History     Not on file.     Social History Main Topics     Smoking status: Never Smoker     Smokeless tobacco: Never Used     Alcohol use Yes      Comment: 5 drinks per week     Drug use: No     Sexual activity: Yes     Partners: Male     Birth control/ protection: Rhythm     Other Topics Concern      Service No     Blood Transfusions No     Caffeine Concern No     Occupational Exposure No     Hobby Hazards No     Sleep Concern No     Stress Concern No     Weight Concern No     Special Diet No     Back Care No     Exercise Yes     sporadic     Bike Helmet Yes     Seat Belt Yes     Self-Exams Yes     EOM     Parent/Sibling W/ Cabg, Mi Or Angioplasty Before 65f 55m? No     Social History Narrative    Caffeine intake/servings daily - 6    Calcium intake/servings daily - 4    Exercise 3 times weekly - describe biking, walking, jogging    Sunscreen used - No    Seatbelts used - Yes    Guns stored in the home - No    Self Breast Exam - No    Pap test up to date -  Yes    Eye exam up to date -  Yes    Dental exam up to date -  Yes    DEXA scan up to date -  Not Applicable    Flex Sig/Colonoscopy up to date -  Not Applicable    Mammography up to date -  Not Applicable    Immunizations reviewed and up to date - Yes    Abuse: Current or Past (Physical, Sexual or Emotional) - No    Do you feel safe in your environment - Yes    Do you cope well with stress - Yes    Do you suffer from insomnia - Yes-uses tylenlol pm or unisom    Last updated by: Joanne Gilligan 8/29/08        FAMILY HISTORY:   Family History   Problem Relation Age of Onset     Hypertension Mother      Breast Cancer Mother      58     Other Cancer Mother      pancreatic CA with mets     Neurologic Disorder Father      migraines + Menieres     Connective Tissue Disorder Father      ichtyosis     Alcohol/Drug Maternal  Grandmother      ETOH     Respiratory Maternal Grandfather      emphysema     C.A.D. Paternal Grandmother      Other - See Comments Son      ichthyosis        EXAM:Vitals: /68  Pulse 60  Temp 98.4  F (36.9  C) (Tympanic)  Resp 16  Wt 159 lb (72.1 kg)  LMP 08/03/2017  SpO2 98%  BMI 27.51 kg/m2  BMI= Body mass index is 27.51 kg/(m^2).    General appearance: Patient is alert and fully cooperative with history & exam.  No sign of distress is noted during the visit.     Psychiatric: Affect is pleasant & appropriate.  Patient appears motivated to improve health.     Respiratory: Breathing is regular & unlabored while sitting.     HEENT: Hearing is intact to spoken word.  Speech is clear.  No gross evidence of visual impairment that would impact ambulation.     Dermatologic: Skin is intact to both lower extremities without significant lesions, rash or abrasion.  No paronychia or evidence of soft tissue infection is noted.     Vascular: DP & PT pulses are intact & regular bilaterally.  No significant edema or varicosities noted.  CFT and skin temperature are normal to both lower extremities.     Neurologic: Lower extremity sensation is intact to light touch.  No evidence of weakness or contracture in the lower extremities.  No evidence of neuropathy.     Musculoskeletal: R 1st MPJ ROM limited with dorsal spurring noted.  Joint pain with end dorsiflexion.  Patient is ambulatory without assistive device or brace.  No gross ankle deformity noted.  No foot or ankle joint effusion is noted.    XRs of R foot reviewed with pt.  1st MPJ narrowing, spurring, loose body.     ASSESSMENT: R hallux limitus     PLAN:  Reviewed patient's chart in epic.  Discussed condition and treatment options including pros and cons.    Surgical and non-surgical treatment options for hallux limitus were discussed.  This includes my philosophy about different procedures including cheilectomy, osteotomy and fusion.  I explained how fusion is  for late stage treatment of advanced arthritis.  There is no certainty that the non-fusion procedures will improve joint pain that is caused by arthritis.  I explained that hallux limitus is oftentimes surgically treated in a staged manner.  This means that additional surgery may be necessary over time.  Non-operative treatments like stiff soles, orthotics, injection and NSAIDs were discussed.  Shoes that provide extra room for the enlarged joint should be helpful.  I would anticipate somewhat short term benefit from joint injection.    Risks and potential complications of treatment were discussed including side effects from joint injection, limited benefit from orthotics due to the enlarged joint and the progressive nature of this condition.      Pt will proceed with nonoperative measures for now.  Handout given.  F/u prn.      Fredi Case DPM, FACFAS    Weight management plan: Patient was referred to their PCP to discuss a diet and exercise plan.        Again, thank you for allowing me to participate in the care of your patient.        Sincerely,        Fredi Case DPM

## 2017-08-14 NOTE — MR AVS SNAPSHOT
"              After Visit Summary   8/14/2017    Belle Tejada    MRN: [id number]           Patient Information     Date Of Birth          1970        Visit Information        Provider Department      8/14/2017 10:00 AM Fredi Case DPM [facility]        Today's Diagnoses     Hallux limitus, right    -  1      Care Instructions    DEGENERATIVE ARTHRITIS OF THE BIG TOE JOINT (hallux limitus/hallux rigidus)   Arthritis of the joint at the base of the big toe (metatarsophalangeal joint) has several causes. Usually it results from repetitive trauma to the joint, secondary to abnormal foot mechanics. Often it is hereditary. However, a one-time traumatic event can lead to arthritis. The condition can worsen with time. The cartilage wears out, joint surfaces are no longer smooth, bone rubs on bone, inflammation occurs with pain, and eventually bone spurs and loose fragments might develop.   The joint is often painful with activity, worse with flimsy shoes or walking barefoot, and it slowly progresses over time. A person might notice the toe \"locking up\" with walking. There often is an obvious, and irritating, bony bump on top of the foot. Shoes might be uncomfortable. In some people the pain is so bothersome that recreational activities sometimes even normal daily activities are difficult to perform.   The pain from this arthritis is likely a combination of joint jamming, cartilage loss and inflammation, and irritation from shoes rubbing on the bump. Sometimes other parts of the foot, leg, or back hurt from altering one's walk to compensate for the painful joint.     Ways to help a person live with the discomfort include wearing a good, supportive shoe with a rigid, rocker-type bottom. An example is a hiking boot. A rigid sole minimizes bending of the joint, and therefore, joint motion and pain. Shoes with a high toe box allow for less rubbing on the bump. Avoiding barefoot walking, " sandals, flip-flops and slippers usually helps.     Sometimes an insert or orthotic provides symptom relief. This might make shoe fit more difficult. Pads over the bump and occasionally injections into the joint provide relief.     Surgery for this condition is aimed towards alleviating pain. It does not cure the arthritis nor does it guarantee better joint motion. Depending on the condition of the metatarsophalangeal joint, there are several surgical options:    1.  Cutting off the bony bump(s) and cleaning the joint    2.  Loosening the joint up by making cuts in the first metatarsal bone or the big toe bone and removing a small section of bone.    3.  Repositioning bone to minimize jamming of the joint.    4.  In severe cases, the joint is fused. By fusing the joint, it will never bend again. This resolves the pain, because it's the movement of a worn out joint that causes pain. Oftentimes the operation involves a combination of these procedures and requires the use of screws, pins, and/or a small surgical plate.     Healing after surgery requires about six weeks of protection. This allows the bone to heal. Maximum recovery takes about one year. The scar tissue and joint structures require this amount of time to finish the healing process. Expect stiffness, swelling and numbness during that time frame.   Surgery for arthritis of the metatarsophalangeal joint does involve side effects. Some side effects are predictable and others are less common but do occur. A scar will be visible and could be irritated by shoes. The shoe may rub on the screw or internal pin requiring surgical removal of these fixation devices. The screw and pin would likely be left in place for a full year. The first toe may remain stiff after surgery. The amount of stiffness is variable. Most people never regain normal motion of the first toe. This is due to scar tissue inherent to any surgery, in addition to the cumulative effects of  arthritis. Sometimes the big toe drifts to one side or the other. Joint fusion is one option to correct an unstable, drifting toe.   All surgical procedures involve risk of infection, numbness, pain, delayed bone healing, osteotomy (bone cut) dislocation, blood clots, continued foot pain, etc. Arthritic joint surgery is quite complex and should not be taken lightly.    Any skin incision can lead to infection. Deep infection might involve the bone and thus repeat surgery and six weeks of IV antibiotics. Scar tissue can cause nerve pain or numbness. This is generally temporary but can be permanent. We do not have treatments that cure nerve problems. Second toe pain could be related to altered mechanics and pressure transferred to the second toe. Delayed bone healing would lengthen the healing time. Some bones simply do not heal. This requires repeat surgery, electronic bone stimulation and/or extended protection. Smokers have an approximate 20% chance of poor bone healing. This is double that of a non-smoker. The bone cut may displace. This may need to be repaired with a second operation. Displacement can cause joint malalignment. Immobility after surgery can cause a blood clot in the legs and lungs. This could result in death.   Foot pain is complex. Most feet hurt for more than one reason. Operating on the arthritic   big toe joint will not necessarily create a pain free foot. Appropriate shoes, healthy body weight, avoidance of bare foot walking and moderation of activity will always be necessary to enjoy foot comfort. Arthritis is incurable even with surgery.     Surgery for this type of arthritis is nevertheless quite successful. Most surgical patients are pleased with their foot following surgery. Many of the issues described above can be controlled by taking proper care of your foot during the healing process.   Cosmetic bump surgery is discouraged for the reasons listed above. A bump and joint that is  comfortable when wearing appropriate shoes should simply be treated with appropriate shoes.   Your surgeon would be happy to fully describe any of the above issues. You should pursue a full understanding of the operation, recovery process and any potential problems that could develop.           Body Mass Index (BMI)  Many things can cause foot and ankle problems. Foot structure, activity level, foot mechanics and injuries are common causes of pain.  One very important issue that often goes unmentioned is body weight. Extra weight can cause increased stress on muscles, ligaments, bones and tendons. Sometimes just a few extra pounds is all it takes to put one over her/his threshold. Without reducing that stress, it can be difficult to alleviate pain.   Some people are uncomfortable addressing this issue, but we feel it is important for you to think about it. As Foot & Ankle specialists, our job is addressing the lower extremity problem and possible causes.   Regarding extra body weight, we encourage patients to discuss diet and weight management plans with their primary care doctors. It is this team approach that gives you the best opportunity for pain relief and getting you back on your feet.             Follow-ups after your visit        Follow-up notes from your care team     Return if symptoms worsen or fail to improve.      Who to contact     If you have questions or need follow up information about today's clinic visit or your schedule please contact [facility] directly at [phone].  Normal or non-critical lab and imaging results will be communicated to you by MyChart, letter or phone within 4 business days after the clinic has received the results. If you do not hear from us within 7 days, please contact the clinic through MyChart or phone. If you have a critical or abnormal lab result, we will notify you by phone as soon as possible.  Submit refill requests through [facility] or call your  pharmacy and they will forward the refill request to us. Please allow 3 business days for your refill to be completed.          Additional Information About Your Visit        [facility] Information     [employer] gives you secure access to your electronic health record. If you see a primary care provider, you can also send messages to your care team and make appointments. If you have questions, please call your primary care clinic.  If you do not have a primary care provider, please call [phone] and they will assist you.        Care EveryWhere ID     This is your Care EveryWhere ID. This could be used by other organizations to access your [facility] medical records  [id number]        Your Vitals Were     Pulse Temperature Respirations Last Period Pulse Oximetry BMI (Body Mass Index)    60 98.4  F (36.9  C) (Tympanic) 16 08/03/2017 98% 27.51 kg/m2       Blood Pressure from Last 3 Encounters:   08/14/17 104/68   08/14/17 104/68   07/24/17 108/73    Weight from Last 3 Encounters:   08/14/17 159 lb (72.1 kg)   08/14/17 159 lb (72.1 kg)   07/24/17 155 lb (70.3 kg)              We Performed the Following     XR Foot Right G/E 3 Views          Today's Medication Changes          These changes are accurate as of: 8/14/17 10:55 AM.  If you have any questions, ask your nurse or doctor.               These medicines have changed or have updated prescriptions.        Dose/Directions    doxylamine 25 MG Tabs tablet   Commonly known as:  UNISOM   This may have changed:    - when to take this  - additional instructions   Used for:  Insomnia        Dose:  25 mg   Take 1 tablet (25 mg) by mouth At Bedtime   Quantity:  30 each   Refills:  11                Primary Care Provider Office Phone # Fax #    [facility] [phone] [address] [place] [address]        Equal Access to Services     DUNIA CONSTANTINO AH: Juanita Hall, vonnie vogt, tay vo,  nola fowlerfranky dixon'aan ah. So [facility] [phone].    ATENCIÓN: Si habla tito, tiene a irene disposición servicios gratuitos de asistencia lingüística. Nixon fajardo [phone].    We comply with applicable federal civil rights laws and [place] laws. We do not discriminate on the basis of race, color, national origin, age, disability sex, sexual orientation or gender identity.            Thank you!     Thank you for choosing [facility]  for your care. Our goal is always to provide you with excellent care. Hearing back from our patients is one way we can continue to improve our services. Please take a few minutes to complete the written survey that you may receive in the mail after your visit with us. Thank you!             Your Updated Medication List - Protect others around you: Learn how to safely use, store and throw away your medicines at www.disposemymeds.org.          This list is accurate as of: 8/14/17 10:55 AM.  Always use your most recent med list.                   Brand Name Dispense Instructions for use Diagnosis    desonide 0.05 % lotion    DESOWEN          doxylamine 25 MG Tabs tablet    UNISOM    30 each    Take 1 tablet (25 mg) by mouth At Bedtime    Insomnia       levothyroxine 137 MCG tablet    SYNTHROID/LEVOTHROID    90 tablet    Take 1 tablet (137 mcg) by mouth daily . Managed by endocrinology ([facility]).        MIRENA (52 MG) 20 MCG/24HR IUD   Generic drug:  levonorgestrel      1 each by Intrauterine route once        sertraline 50 MG tablet    ZOLOFT    90 tablet    Take 1 tablet (50 mg) by mouth daily    Adjustment disorder with anxious mood

## 2017-08-14 NOTE — PATIENT INSTRUCTIONS
"DEGENERATIVE ARTHRITIS OF THE BIG TOE JOINT (hallux limitus/hallux rigidus)   Arthritis of the joint at the base of the big toe (metatarsophalangeal joint) has several causes. Usually it results from repetitive trauma to the joint, secondary to abnormal foot mechanics. Often it is hereditary. However, a one-time traumatic event can lead to arthritis. The condition can worsen with time. The cartilage wears out, joint surfaces are no longer smooth, bone rubs on bone, inflammation occurs with pain, and eventually bone spurs and loose fragments might develop.   The joint is often painful with activity, worse with flimsy shoes or walking barefoot, and it slowly progresses over time. A person might notice the toe \"locking up\" with walking. There often is an obvious, and irritating, bony bump on top of the foot. Shoes might be uncomfortable. In some people the pain is so bothersome that recreational activities sometimes even normal daily activities are difficult to perform.   The pain from this arthritis is likely a combination of joint jamming, cartilage loss and inflammation, and irritation from shoes rubbing on the bump. Sometimes other parts of the foot, leg, or back hurt from altering one's walk to compensate for the painful joint.     Ways to help a person live with the discomfort include wearing a good, supportive shoe with a rigid, rocker-type bottom. An example is a hiking boot. A rigid sole minimizes bending of the joint, and therefore, joint motion and pain. Shoes with a high toe box allow for less rubbing on the bump. Avoiding barefoot walking, sandals, flip-flops and slippers usually helps.     Sometimes an insert or orthotic provides symptom relief. This might make shoe fit more difficult. Pads over the bump and occasionally injections into the joint provide relief.     Surgery for this condition is aimed towards alleviating pain. It does not cure the arthritis nor does it guarantee better joint motion. " Depending on the condition of the metatarsophalangeal joint, there are several surgical options:    1.  Cutting off the bony bump(s) and cleaning the joint    2.  Loosening the joint up by making cuts in the first metatarsal bone or the big toe bone and removing a small section of bone.    3.  Repositioning bone to minimize jamming of the joint.    4.  In severe cases, the joint is fused. By fusing the joint, it will never bend again. This resolves the pain, because it's the movement of a worn out joint that causes pain. Oftentimes the operation involves a combination of these procedures and requires the use of screws, pins, and/or a small surgical plate.     Healing after surgery requires about six weeks of protection. This allows the bone to heal. Maximum recovery takes about one year. The scar tissue and joint structures require this amount of time to finish the healing process. Expect stiffness, swelling and numbness during that time frame.   Surgery for arthritis of the metatarsophalangeal joint does involve side effects. Some side effects are predictable and others are less common but do occur. A scar will be visible and could be irritated by shoes. The shoe may rub on the screw or internal pin requiring surgical removal of these fixation devices. The screw and pin would likely be left in place for a full year. The first toe may remain stiff after surgery. The amount of stiffness is variable. Most people never regain normal motion of the first toe. This is due to scar tissue inherent to any surgery, in addition to the cumulative effects of arthritis. Sometimes the big toe drifts to one side or the other. Joint fusion is one option to correct an unstable, drifting toe.   All surgical procedures involve risk of infection, numbness, pain, delayed bone healing, osteotomy (bone cut) dislocation, blood clots, continued foot pain, etc. Arthritic joint surgery is quite complex and should not be taken lightly.    Any  skin incision can lead to infection. Deep infection might involve the bone and thus repeat surgery and six weeks of IV antibiotics. Scar tissue can cause nerve pain or numbness. This is generally temporary but can be permanent. We do not have treatments that cure nerve problems. Second toe pain could be related to altered mechanics and pressure transferred to the second toe. Delayed bone healing would lengthen the healing time. Some bones simply do not heal. This requires repeat surgery, electronic bone stimulation and/or extended protection. Smokers have an approximate 20% chance of poor bone healing. This is double that of a non-smoker. The bone cut may displace. This may need to be repaired with a second operation. Displacement can cause joint malalignment. Immobility after surgery can cause a blood clot in the legs and lungs. This could result in death.   Foot pain is complex. Most feet hurt for more than one reason. Operating on the arthritic   big toe joint will not necessarily create a pain free foot. Appropriate shoes, healthy body weight, avoidance of bare foot walking and moderation of activity will always be necessary to enjoy foot comfort. Arthritis is incurable even with surgery.     Surgery for this type of arthritis is nevertheless quite successful. Most surgical patients are pleased with their foot following surgery. Many of the issues described above can be controlled by taking proper care of your foot during the healing process.   Cosmetic bump surgery is discouraged for the reasons listed above. A bump and joint that is comfortable when wearing appropriate shoes should simply be treated with appropriate shoes.   Your surgeon would be happy to fully describe any of the above issues. You should pursue a full understanding of the operation, recovery process and any potential problems that could develop.           Body Mass Index (BMI)  Many things can cause foot and ankle problems. Foot structure,  activity level, foot mechanics and injuries are common causes of pain.  One very important issue that often goes unmentioned is body weight. Extra weight can cause increased stress on muscles, ligaments, bones and tendons. Sometimes just a few extra pounds is all it takes to put one over her/his threshold. Without reducing that stress, it can be difficult to alleviate pain.   Some people are uncomfortable addressing this issue, but we feel it is important for you to think about it. As Foot & Ankle specialists, our job is addressing the lower extremity problem and possible causes.   Regarding extra body weight, we encourage patients to discuss diet and weight management plans with their primary care doctors. It is this team approach that gives you the best opportunity for pain relief and getting you back on your feet.

## 2017-08-14 NOTE — NURSING NOTE
"Chief Complaint   Patient presents with     Physical       Initial /68  Pulse 60  Resp 16  Ht 5' 3.75\" (1.619 m)  Wt 159 lb (72.1 kg)  LMP 08/03/2017  SpO2 98%  Breastfeeding? No  BMI 27.51 kg/m2 Estimated body mass index is 27.51 kg/(m^2) as calculated from the following:    Height as of this encounter: 5' 3.75\" (1.619 m).    Weight as of this encounter: 159 lb (72.1 kg).  Medication Reconciliation: complete       Sg Robledo MA       "

## 2017-10-31 ENCOUNTER — RADIANT APPOINTMENT (OUTPATIENT)
Dept: MAMMOGRAPHY | Facility: CLINIC | Age: 47
End: 2017-10-31
Attending: NURSE PRACTITIONER
Payer: COMMERCIAL

## 2017-10-31 DIAGNOSIS — Z12.31 VISIT FOR SCREENING MAMMOGRAM: ICD-10-CM

## 2017-10-31 PROCEDURE — G0202 SCR MAMMO BI INCL CAD: HCPCS

## 2018-04-27 ENCOUNTER — OFFICE VISIT (OUTPATIENT)
Dept: FAMILY MEDICINE | Facility: CLINIC | Age: 48
End: 2018-04-27
Payer: COMMERCIAL

## 2018-04-27 VITALS
BODY MASS INDEX: 30.05 KG/M2 | HEART RATE: 72 BPM | SYSTOLIC BLOOD PRESSURE: 119 MMHG | RESPIRATION RATE: 20 BRPM | HEIGHT: 64 IN | OXYGEN SATURATION: 99 % | TEMPERATURE: 97.9 F | WEIGHT: 176 LBS | DIASTOLIC BLOOD PRESSURE: 72 MMHG

## 2018-04-27 DIAGNOSIS — R82.90 NONSPECIFIC FINDING ON EXAMINATION OF URINE: ICD-10-CM

## 2018-04-27 DIAGNOSIS — N39.0 URINARY TRACT INFECTION, BACTERIAL: Primary | ICD-10-CM

## 2018-04-27 DIAGNOSIS — A49.9 URINARY TRACT INFECTION, BACTERIAL: Primary | ICD-10-CM

## 2018-04-27 DIAGNOSIS — R30.0 DYSURIA: ICD-10-CM

## 2018-04-27 DIAGNOSIS — R35.0 URINARY FREQUENCY: ICD-10-CM

## 2018-04-27 LAB
ALBUMIN UR-MCNC: 100 MG/DL
APPEARANCE UR: ABNORMAL
BACTERIA #/AREA URNS HPF: ABNORMAL /HPF
BILIRUB UR QL STRIP: NEGATIVE
COLOR UR AUTO: YELLOW
GLUCOSE UR STRIP-MCNC: NEGATIVE MG/DL
HGB UR QL STRIP: ABNORMAL
KETONES UR STRIP-MCNC: NEGATIVE MG/DL
LEUKOCYTE ESTERASE UR QL STRIP: ABNORMAL
NITRATE UR QL: NEGATIVE
PH UR STRIP: 7 PH (ref 5–7)
RBC #/AREA URNS AUTO: ABNORMAL /HPF
SOURCE: ABNORMAL
SP GR UR STRIP: 1.02 (ref 1–1.03)
SPECIMEN SOURCE: NORMAL
UROBILINOGEN UR STRIP-ACNC: 0.2 EU/DL (ref 0.2–1)
WBC #/AREA URNS AUTO: >100 /HPF
WET PREP SPEC: NORMAL

## 2018-04-27 PROCEDURE — 87210 SMEAR WET MOUNT SALINE/INK: CPT | Performed by: FAMILY MEDICINE

## 2018-04-27 PROCEDURE — 81001 URINALYSIS AUTO W/SCOPE: CPT | Performed by: FAMILY MEDICINE

## 2018-04-27 PROCEDURE — 87088 URINE BACTERIA CULTURE: CPT | Performed by: FAMILY MEDICINE

## 2018-04-27 PROCEDURE — 99213 OFFICE O/P EST LOW 20 MIN: CPT | Performed by: FAMILY MEDICINE

## 2018-04-27 PROCEDURE — 87086 URINE CULTURE/COLONY COUNT: CPT | Performed by: FAMILY MEDICINE

## 2018-04-27 RX ORDER — NITROFURANTOIN 25; 75 MG/1; MG/1
100 CAPSULE ORAL 2 TIMES DAILY
Qty: 14 CAPSULE | Refills: 0 | Status: SHIPPED | OUTPATIENT
Start: 2018-04-27 | End: 2018-06-05

## 2018-04-27 NOTE — MR AVS SNAPSHOT
After Visit Summary   4/27/2018    Belle Tejada    MRN: [id number]           Patient Information     Date Of Birth          1970        Visit Information        Provider Department      4/27/2018 2:00 PM Tana Mendoza MD [facility]        Today's Diagnoses     Urinary tract infection, bacterial    -  1    Urinary frequency        Dysuria        Nonspecific finding on examination of urine          Care Instructions      Urinary Tract Infections in Women    Urinary tract infections (UTIs) are most often caused by bacteria. These bacteria enter the urinary tract. The bacteria may come from outside the body. Or they may travel from the skin outside the rectum or vagina into the urethra. Female anatomy makes it easy for bacteria from the bowel to enter a woman s urinary tract, which is the most common source of UTI. This means women develop UTIs more often than men. Pain in or around the urinary tract is a common UTI symptom. But the only way to know for sure if you have a UTI for the healthcare provider to test your urine. The two tests that may be done are the urinalysis and urine culture.  Types of UTIs    Cystitis. A bladder infection (cystitis) is the most common UTI in women. You may have urgent or frequent urination. You may also have pain, burning when you urinate, and bloody urine.    Urethritis. This is an inflamed urethra, which is the tube that carries urine from the bladder to outside the body. You may have lower stomach or back pain. You may also have urgent or frequent urination.    Pyelonephritis. This is a kidney infection. If not treated, it can be serious and damage your kidneys. In severe cases, you may need to stay in the hospital. You may have a fever and lower back pain.  Medicines to treat a UTI  Most UTIs are treated with antibiotics. These kill the bacteria. The length of time you need to take them depends on the type of infection. It may be as short as 3  days. If you have repeated UTIs, you may need a low-dose antibiotic for several months. Take antibiotics exactly as directed. Don t stop taking them until all of the medicine is gone. If you stop taking the antibiotic too soon, the infection may not go away. You may also develop a resistance to the antibiotic. This can make it much harder to treat.  Lifestyle changes to treat and prevent UTIs  The lifestyle changes below will help get rid of your UTI. They may also help prevent future UTIs.    Drink plenty of fluids. This includes water, juice, or other caffeine-free drinks. Fluids help flush bacteria out of your body.    Empty your bladder. Always empty your bladder when you feel the urge to urinate. And always urinate before going to sleep. Urine that stays in your bladder can lead to infection. Try to urinate before and after sex as well.    Practice good personal hygiene. Wipe yourself from front to back after using the toilet. This helps keep bacteria from getting into the urethra.    Use condoms during sex. These help prevent UTIs caused by sexually transmitted bacteria. Also don't use spermicides during sex. These can increase the risk for UTIs. Choose other forms of birth control instead. For women who tend to get UTIs after sex, a low-dose of a preventive antibiotic may be used. Be sure to discuss this option with your healthcare provider.    Follow up with your healthcare provider as directed. He or she may test to make sure the infection has cleared. If needed, more treatment may be started.  Date Last Reviewed: 1/1/2017 2000-2017 [employer]. [address] [place] [address], [place] [address]. All rights reserved. This information is not intended as a substitute for professional medical care. Always follow your healthcare professional's instructions.                Follow-ups after your visit        Who to contact     If you have questions or need follow up information about today's clinic visit  "or your schedule please contact [facility] directly at [phone].  Normal or non-critical lab and imaging results will be communicated to you by mPortalhart, letter or phone within 4 business days after the clinic has received the results. If you do not hear from us within 7 days, please contact the clinic through Havsjo Delikat[employer]ert or phone. If you have a critical or abnormal lab result, we will notify you by phone as soon as possible.  Submit refill requests through SafetyCulture or call your pharmacy and they will forward the refill request to us. Please allow 3 business days for your refill to be completed.          Additional Information About Your Visit        SafetyCulture Information     SafetyCulture gives you secure access to your electronic health record. If you see a primary care provider, you can also send messages to your care team and make appointments. If you have questions, please call your primary care clinic.  If you do not have a primary care provider, please call [phone] and they will assist you.        Care EveryWhere ID     This is your Care EveryWhere ID. This could be used by other organizations to access your [place] medical records  [id number]        Your Vitals Were     Pulse Temperature Respirations Height Pulse Oximetry BMI (Body Mass Index)    72 97.9  F (36.6  C) (Oral) 20 5' 4\" (1.626 m) 99% 30.21 kg/m2       Blood Pressure from Last 3 Encounters:   04/27/18 119/72   08/14/17 104/68   08/14/17 104/68    Weight from Last 3 Encounters:   04/27/18 176 lb (79.8 kg)   08/14/17 159 lb (72.1 kg)   08/14/17 159 lb (72.1 kg)              We Performed the Following     UA reflex to Microscopic and Culture     Urine Culture Aerobic Bacterial     Urine Microscopic     Wet prep          Today's Medication Changes          These changes are accurate as of 4/27/18  2:26 PM.  If you have any questions, ask your nurse or doctor.               Start taking these medicines.        Dose/Directions    " nitroFURantoin (macrocrystal-monohydrate) 100 MG capsule   Commonly known as:  MACROBID   Used for:  Urinary tract infection, bacterial, Urinary frequency, Dysuria   Started by:  Tana Mendoza MD        Dose:  100 mg   Take 1 capsule (100 mg) by mouth 2 times daily   Quantity:  14 capsule   Refills:  0         These medicines have changed or have updated prescriptions.        Dose/Directions    doxylamine 25 MG Tabs tablet   Commonly known as:  UNISOM   This may have changed:    - when to take this  - additional instructions   Used for:  Insomnia        Dose:  25 mg   Take 1 tablet (25 mg) by mouth At Bedtime   Quantity:  30 each   Refills:  11            Where to get your medicines      These medications were sent to [facility] [address] [place] [address] AT [address] [address]     Phone:  [phone]     nitroFURantoin (macrocrystal-monohydrate) 100 MG capsule                Primary Care Provider Office Phone # Fax #    STARR Robert [facility] [phone] [address] [place] [address]        Equal Access to Services     DUNIA CONSTANTINO AH: Hadii aad ku hadasho Soomaali, waaxda luqadaha, qaybta kaalmada adeegyada, waxay betzaidain hayheavenlyn nathen hunt. So [facility] [phone].    ATENCIÓN: Si habla español, tiene a irene disposición servicios gratuitos de asistencia lingüística. [facility] [phone].    We comply with applicable federal civil rights laws and [place] laws. We do not discriminate on the basis of race, color, national origin, age, disability, sex, sexual orientation, or gender identity.            Thank you!     Thank you for choosing [facility]  for your care. Our goal is always to provide you with excellent care. Hearing back from our patients is one way we can continue to improve our services. Please take a few minutes to complete the written survey that you may  receive in the mail after your visit with us. Thank you!             Your Updated Medication List - Protect others around you: Learn how to safely use, store and throw away your medicines at www.disposemymeds.org.          This list is accurate as of 4/27/18  2:26 PM.  Always use your most recent med list.                   Brand Name Dispense Instructions for use Diagnosis    desonide 0.05 % lotion    DESOWEN          doxylamine 25 MG Tabs tablet    UNISOM    30 each    Take 1 tablet (25 mg) by mouth At Bedtime    Insomnia       levothyroxine 137 MCG tablet    SYNTHROID/LEVOTHROID    90 tablet    Take 1 tablet (137 mcg) by mouth daily . Managed by endocrinology ([facility]).        MIRENA (52 MG) 20 MCG/24HR IUD   Generic drug:  levonorgestrel      1 each by Intrauterine route once        nitroFURantoin (macrocrystal-monohydrate) 100 MG capsule    MACROBID    14 capsule    Take 1 capsule (100 mg) by mouth 2 times daily    Urinary tract infection, bacterial, Urinary frequency, Dysuria       sertraline 50 MG tablet    ZOLOFT    90 tablet    Take 1 tablet (50 mg) by mouth daily    Adjustment disorder with anxious mood

## 2018-04-27 NOTE — PATIENT INSTRUCTIONS
Urinary Tract Infections in Women    Urinary tract infections (UTIs) are most often caused by bacteria. These bacteria enter the urinary tract. The bacteria may come from outside the body. Or they may travel from the skin outside the rectum or vagina into the urethra. Female anatomy makes it easy for bacteria from the bowel to enter a woman s urinary tract, which is the most common source of UTI. This means women develop UTIs more often than men. Pain in or around the urinary tract is a common UTI symptom. But the only way to know for sure if you have a UTI for the healthcare provider to test your urine. The two tests that may be done are the urinalysis and urine culture.  Types of UTIs    Cystitis. A bladder infection (cystitis) is the most common UTI in women. You may have urgent or frequent urination. You may also have pain, burning when you urinate, and bloody urine.    Urethritis. This is an inflamed urethra, which is the tube that carries urine from the bladder to outside the body. You may have lower stomach or back pain. You may also have urgent or frequent urination.    Pyelonephritis. This is a kidney infection. If not treated, it can be serious and damage your kidneys. In severe cases, you may need to stay in the hospital. You may have a fever and lower back pain.  Medicines to treat a UTI  Most UTIs are treated with antibiotics. These kill the bacteria. The length of time you need to take them depends on the type of infection. It may be as short as 3 days. If you have repeated UTIs, you may need a low-dose antibiotic for several months. Take antibiotics exactly as directed. Don t stop taking them until all of the medicine is gone. If you stop taking the antibiotic too soon, the infection may not go away. You may also develop a resistance to the antibiotic. This can make it much harder to treat.  Lifestyle changes to treat and prevent UTIs  The lifestyle changes below will help get rid of your UTI. They  may also help prevent future UTIs.    Drink plenty of fluids. This includes water, juice, or other caffeine-free drinks. Fluids help flush bacteria out of your body.    Empty your bladder. Always empty your bladder when you feel the urge to urinate. And always urinate before going to sleep. Urine that stays in your bladder can lead to infection. Try to urinate before and after sex as well.    Practice good personal hygiene. Wipe yourself from front to back after using the toilet. This helps keep bacteria from getting into the urethra.    Use condoms during sex. These help prevent UTIs caused by sexually transmitted bacteria. Also don't use spermicides during sex. These can increase the risk for UTIs. Choose other forms of birth control instead. For women who tend to get UTIs after sex, a low-dose of a preventive antibiotic may be used. Be sure to discuss this option with your healthcare provider.    Follow up with your healthcare provider as directed. He or she may test to make sure the infection has cleared. If needed, more treatment may be started.  Date Last Reviewed: 1/1/2017 2000-2017 [employer]. [address] [place] [address]. All rights reserved. This information is not intended as a substitute for professional medical care. Always follow your healthcare professional's instructions.

## 2018-04-28 LAB
BACTERIA SPEC CULT: ABNORMAL
BACTERIA SPEC CULT: ABNORMAL
SPECIMEN SOURCE: ABNORMAL

## 2018-06-05 ENCOUNTER — OFFICE VISIT (OUTPATIENT)
Dept: FAMILY MEDICINE | Facility: CLINIC | Age: 48
End: 2018-06-05
Payer: COMMERCIAL

## 2018-06-05 VITALS
HEART RATE: 61 BPM | DIASTOLIC BLOOD PRESSURE: 73 MMHG | OXYGEN SATURATION: 98 % | RESPIRATION RATE: 16 BRPM | WEIGHT: 168 LBS | BODY MASS INDEX: 28.84 KG/M2 | TEMPERATURE: 97.5 F | SYSTOLIC BLOOD PRESSURE: 107 MMHG

## 2018-06-05 DIAGNOSIS — R21 RASH: Primary | ICD-10-CM

## 2018-06-05 PROCEDURE — 99213 OFFICE O/P EST LOW 20 MIN: CPT | Performed by: NURSE PRACTITIONER

## 2018-06-05 RX ORDER — HYDROCORTISONE VALERATE CREAM 2 MG/G
CREAM TOPICAL
Qty: 30 G | Refills: 0 | Status: SHIPPED | OUTPATIENT
Start: 2018-06-05 | End: 2022-04-28

## 2018-06-05 NOTE — MR AVS SNAPSHOT
After Visit Summary   6/5/2018    Belle Tejada    MRN: [id number]           Patient Information     Date Of Birth          1970        Visit Information        Provider Department      6/5/2018 11:40 AM Pam Rai APRN CNP [facility]        Today's Diagnoses     Rash    -  1       Follow-ups after your visit        Who to contact     If you have questions or need follow up information about today's clinic visit or your schedule please contact [facility] directly at [phone].  Normal or non-critical lab and imaging results will be communicated to you by Beta Dashhart, letter or phone within 4 business days after the clinic has received the results. If you do not hear from us within 7 days, please contact the clinic through Mingyiant or phone. If you have a critical or abnormal lab result, we will notify you by phone as soon as possible.  Submit refill requests through Droplr or call your pharmacy and they will forward the refill request to us. Please allow 3 business days for your refill to be completed.          Additional Information About Your Visit        MyChart Information     Droplr gives you secure access to your electronic health record. If you see a primary care provider, you can also send messages to your care team and make appointments. If you have questions, please call your primary care clinic.  If you do not have a primary care provider, please call [phone] and they will assist you.        Care EveryWhere ID     This is your Care EveryWhere ID. This could be used by other organizations to access your [place] medical records  [id number]        Your Vitals Were     Pulse Temperature Respirations Pulse Oximetry Breastfeeding? BMI (Body Mass Index)    61 97.5  F (36.4  C) (Oral) 16 98% No 28.84 kg/m2       Blood Pressure from Last 3 Encounters:   06/05/18 107/73   04/27/18 119/72   08/14/17 104/68    Weight from Last 3  Encounters:   06/05/18 168 lb (76.2 kg)   04/27/18 176 lb (79.8 kg)   08/14/17 159 lb (72.1 kg)              Today, you had the following     No orders found for display         Today's Medication Changes          These changes are accurate as of 6/5/18  2:44 PM.  If you have any questions, ask your nurse or doctor.               Start taking these medicines.        Dose/Directions    hydrocortisone 0.2 % cream   Commonly known as:  WESTCORT   Used for:  Rash        Apply sparingly to affected area three times daily for 14 days.   Quantity:  30 g   Refills:  0         These medicines have changed or have updated prescriptions.        Dose/Directions    doxylamine 25 MG Tabs tablet   Commonly known as:  UNISOM   This may have changed:    - when to take this  - additional instructions   Used for:  Insomnia        Dose:  25 mg   Take 1 tablet (25 mg) by mouth At Bedtime   Quantity:  30 each   Refills:  11            Where to get your medicines      These medications were sent to [facility] [address] [place] [address] AT [address] [address]    Hours:  24-hours Phone:  [phone]     hydrocortisone 0.2 % cream                Primary Care Provider Office Phone # Fax #    STARR Robert [phone]       [facility] [place] [address]        Equal Access to Services     DUNIA CONSTANTINO AH: Hadii medardo burnetto Soramakrishna, waaxda luqadaha, qaybta kaalmada adeegyada, nola howard . So [facility] [phone].    ATENCIÓN: Si habla español, tiene a irene disposición servicios gratuitos de asistencia lingüística. Aliciaame al [phone].    We comply with applicable federal civil rights laws and [place] laws. We do not discriminate on the basis of race, color, national origin, age, disability, sex, sexual orientation, or gender identity.            Thank you!     Thank you for choosing  [facility]  for your care. Our goal is always to provide you with excellent care. Hearing back from our patients is one way we can continue to improve our services. Please take a few minutes to complete the written survey that you may receive in the mail after your visit with us. Thank you!             Your Updated Medication List - Protect others around you: Learn how to safely use, store and throw away your medicines at www.disposemymeds.org.          This list is accurate as of 6/5/18  2:44 PM.  Always use your most recent med list.                   Brand Name Dispense Instructions for use Diagnosis    desonide 0.05 % lotion    DESOWEN          doxylamine 25 MG Tabs tablet    UNISOM    30 each    Take 1 tablet (25 mg) by mouth At Bedtime    Insomnia       hydrocortisone 0.2 % cream    WESTCORT    30 g    Apply sparingly to affected area three times daily for 14 days.    Rash       levothyroxine 137 MCG tablet    SYNTHROID/LEVOTHROID    90 tablet    Take 1 tablet (137 mcg) by mouth daily . Managed by endocrinology ([facility]).        MIRENA (52 MG) 20 MCG/24HR IUD   Generic drug:  levonorgestrel      1 each by Intrauterine route once        sertraline 50 MG tablet    ZOLOFT    90 tablet    Take 1 tablet (50 mg) by mouth daily    Adjustment disorder with anxious mood

## 2018-06-05 NOTE — PROGRESS NOTES
SUBJECTIVE:   Belle Tejada is a 47 year old female who presents to clinic today for the following health issues:      Rash      Duration: yesterday     Description  Location: started on arms- this morning moved to torso   Itching: no  She reports that she accidentally found this rash.    Intensity:  moderate    Accompanying signs and symptoms: sinus problem on Monday- took a couple of different OTC medications     History (similar episodes/previous evaluation): None    Precipitating or alleviating factors:  New exposures:  Possibly- [employer] shopping for clothes recently   Recent travel: no      Therapies tried and outcome: benadryl cream on arms       Problem list and histories reviewed & adjusted, as indicated.  Additional history: as documented    Patient Active Problem List   Diagnosis     Acquired hypothyroidism     Insomnia     Dyspareunia     Orgasm dysfunction     Adjustment disorder with anxious mood     Past Surgical History:   Procedure Laterality Date     C NONSPECIFIC PROCEDURE  2005         C NONSPECIFIC PROCEDURE  10/78    Tonsillectomy       Social History   Substance Use Topics     Smoking status: Never Smoker     Smokeless tobacco: Never Used     Alcohol use Yes      Comment: 5 drinks per week     Family History   Problem Relation Age of Onset     Hypertension Mother      Breast Cancer Mother      58     Other Cancer Mother      pancreatic CA with mets     Neurologic Disorder Father      migraines + Menieres     Connective Tissue Disorder Father      ichtyosis     Alcohol/Drug Maternal Grandmother      ETOH     Respiratory Maternal Grandfather      emphysema     C.A.D. Paternal Grandmother      Other - See Comments Son      ichthyosis         Current Outpatient Prescriptions   Medication Sig Dispense Refill     desonide (DESOWEN) 0.05 % lotion   1     doxylamine (UNISOM) 25 MG TABS Take 1 tablet (25 mg) by mouth At Bedtime (Patient taking differently: Take 25 mg by mouth PRN)  30 each 11     hydrocortisone (WESTCORT) 0.2 % cream Apply sparingly to affected area three times daily for 14 days. 30 g 0     levonorgestrel (MIRENA, 52 MG,) 20 MCG/24HR IUD 1 each by Intrauterine route once       levothyroxine (SYNTHROID/LEVOTHROID) 137 MCG tablet Take 1 tablet (137 mcg) by mouth daily . Managed by endocrinology ([facility] 90 tablet 3     sertraline (ZOLOFT) 50 MG tablet Take 1 tablet (50 mg) by mouth daily 90 tablet 3     Allergies   Allergen Reactions     No Known Drug Allergies      Recent Labs   Lab Test  08/12/16   0845  05/13/15   1513  10/29/13   1335  05/02/13   1132   LDL  134*  139*   --   102   HDL  73  77   --   84   TRIG  46  47   --   59   ALT   --    --   25   --    CR   --    --   0.80   --    GFRESTIMATED   --    --   78   --    GFRESTBLACK   --    --   >90   --    POTASSIUM   --    --   4.1   --    TSH   --   1.77  2.50   --       BP Readings from Last 3 Encounters:   06/05/18 107/73   04/27/18 119/72   08/14/17 104/68    Wt Readings from Last 3 Encounters:   06/05/18 168 lb (76.2 kg)   04/27/18 176 lb (79.8 kg)   08/14/17 159 lb (72.1 kg)                  Labs reviewed in EPIC    Reviewed and updated as needed this visit by clinical staff       Reviewed and updated as needed this visit by Provider         ROS:  Constitutional, HEENT, cardiovascular, pulmonary, gi and gu systems are negative, except as otherwise noted.    OBJECTIVE:     /73  Pulse 61  Temp 97.5  F (36.4  C) (Oral)  Resp 16  Wt 168 lb (76.2 kg)  SpO2 98%  Breastfeeding? No  BMI 28.84 kg/m2  Body mass index is 28.84 kg/(m^2).    GENERAL APPEARANCE: healthy, alert and no distress. Smiling.   SKIN: warm and dry.  She has a scattered, papular rash in patches along her bilateral flanks, groin, and 2 isolated papules on her right upper arm.  PSYCH: mentation appears normal and affect normal/bright.  Good eye contact.      ASSESSMENT/PLAN:     (R21) Rash  (primary encounter  diagnosis)  Comment: Differential diagnosis: Contact dermatitis, viral rash, scabies  Plan: hydrocortisone (WESTCORT) 0.2 % cream        I discussed with the patient I do recommend that she take a daily antihistamine of Zyrtec 10 mg a day.  She is to consider what might be causing this in her diet or body products.  The way this rash is distributed, I assume it is actually very isolated.  I anticipate for her that her symptoms will resolve quickly.  I reassured her that this absolutely is not shingles and it does not appear to be scabies.  She is not itchy.  I told her to follow-up with me if her symptoms worsen in anyway.    STARR Eddy [facility]

## 2018-07-20 ENCOUNTER — OFFICE VISIT (OUTPATIENT)
Dept: FAMILY MEDICINE | Facility: CLINIC | Age: 48
End: 2018-07-20
Payer: COMMERCIAL

## 2018-07-20 VITALS
SYSTOLIC BLOOD PRESSURE: 122 MMHG | RESPIRATION RATE: 12 BRPM | WEIGHT: 172 LBS | DIASTOLIC BLOOD PRESSURE: 82 MMHG | OXYGEN SATURATION: 96 % | HEART RATE: 62 BPM | BODY MASS INDEX: 29.52 KG/M2 | TEMPERATURE: 98.4 F

## 2018-07-20 DIAGNOSIS — R30.0 DYSURIA: ICD-10-CM

## 2018-07-20 DIAGNOSIS — R82.90 NONSPECIFIC FINDING ON EXAMINATION OF URINE: ICD-10-CM

## 2018-07-20 DIAGNOSIS — N30.00 ACUTE CYSTITIS WITHOUT HEMATURIA: Primary | ICD-10-CM

## 2018-07-20 LAB
ALBUMIN UR-MCNC: NEGATIVE MG/DL
APPEARANCE UR: CLEAR
BACTERIA #/AREA URNS HPF: ABNORMAL /HPF
BILIRUB UR QL STRIP: NEGATIVE
COLOR UR AUTO: YELLOW
GLUCOSE UR STRIP-MCNC: NEGATIVE MG/DL
HGB UR QL STRIP: ABNORMAL
KETONES UR STRIP-MCNC: NEGATIVE MG/DL
LEUKOCYTE ESTERASE UR QL STRIP: ABNORMAL
NITRATE UR QL: NEGATIVE
NON-SQ EPI CELLS #/AREA URNS LPF: ABNORMAL /LPF
PH UR STRIP: 6.5 PH (ref 5–7)
RBC #/AREA URNS AUTO: ABNORMAL /HPF
SOURCE: ABNORMAL
SP GR UR STRIP: <=1.005 (ref 1–1.03)
UROBILINOGEN UR STRIP-ACNC: 0.2 EU/DL (ref 0.2–1)
WBC #/AREA URNS AUTO: ABNORMAL /HPF

## 2018-07-20 PROCEDURE — 87088 URINE BACTERIA CULTURE: CPT | Performed by: NURSE PRACTITIONER

## 2018-07-20 PROCEDURE — 81001 URINALYSIS AUTO W/SCOPE: CPT | Performed by: NURSE PRACTITIONER

## 2018-07-20 PROCEDURE — 99213 OFFICE O/P EST LOW 20 MIN: CPT | Performed by: NURSE PRACTITIONER

## 2018-07-20 PROCEDURE — 87186 SC STD MICRODIL/AGAR DIL: CPT | Performed by: NURSE PRACTITIONER

## 2018-07-20 PROCEDURE — 87086 URINE CULTURE/COLONY COUNT: CPT | Performed by: NURSE PRACTITIONER

## 2018-07-20 RX ORDER — NITROFURANTOIN 25; 75 MG/1; MG/1
100 CAPSULE ORAL 2 TIMES DAILY
Qty: 14 CAPSULE | Refills: 0 | Status: SHIPPED | OUTPATIENT
Start: 2018-07-20 | End: 2019-06-14

## 2018-07-20 NOTE — PATIENT INSTRUCTIONS
1.  For UTI start macrobid 1 pill twice a day for 7 days.  Lots of fluids, over the counter pyridium or azo as needed for symptoms.  Final culture pending.

## 2018-07-20 NOTE — PROGRESS NOTES
SUBJECTIVE:   Belle Tejada is a 47 year old female who presents to clinic today for the following health issues:      URINARY TRACT SYMPTOMS      Duration: 2 weeks    Description  dysuria, frequency and cloudy urine    Intensity:  moderate    Accompanying signs and symptoms:  Fever/chills: no   Flank pain no   Nausea and vomiting: no   Vaginal symptoms: none  Abdominal/Pelvic Pain: no     History  History of frequent UTI's: no   History of kidney stones: no   Sexually Active: YES  Possibility of pregnancy: No    Precipitating or alleviating factors: None    Therapies tried and outcome: increase fluid intake   Outcome: no change    Prior to symptoms she was unable to void after intercourse, this may have triggered symptoms.      Prior UTI 2017, culture grew group b strep, was treated with macrobid.    Patient Active Problem List   Diagnosis     Acquired hypothyroidism     Insomnia     Dyspareunia     Orgasm dysfunction     Adjustment disorder with anxious mood     Past Surgical History:   Procedure Laterality Date     C NONSPECIFIC PROCEDURE  2005         C NONSPECIFIC PROCEDURE  10/78    Tonsillectomy       Social History   Substance Use Topics     Smoking status: Never Smoker     Smokeless tobacco: Never Used     Alcohol use Yes      Comment: 5 drinks per week     Family History   Problem Relation Age of Onset     Hypertension Mother      Breast Cancer Mother      58     Other Cancer Mother      pancreatic CA with mets     Neurologic Disorder Father      migraines + Menieres     Connective Tissue Disorder Father      ichtyosis     Alcohol/Drug Maternal Grandmother      ETOH     Respiratory Maternal Grandfather      emphysema     C.A.D. Paternal Grandmother      Other - See Comments Son      ichthyosis         Current Outpatient Prescriptions   Medication Sig Dispense Refill     hydrocortisone (WESTCORT) 0.2 % cream Apply sparingly to affected area three times daily for 14 days. 30 g 0      levonorgestrel (MIRENA, 52 MG,) 20 MCG/24HR IUD 1 each by Intrauterine route once       levothyroxine (SYNTHROID/LEVOTHROID) 137 MCG tablet Take 1 tablet (137 mcg) by mouth daily . Managed by endocrinology ([facility]). 90 tablet 3     nitroFURantoin, macrocrystal-monohydrate, (MACROBID) 100 MG capsule Take 1 capsule (100 mg) by mouth 2 times daily 14 capsule 0     sertraline (ZOLOFT) 50 MG tablet Take 1 tablet (50 mg) by mouth daily (Patient taking differently: Take 25 mg by mouth daily ) 90 tablet 3     desonide (DESOWEN) 0.05 % lotion   1     doxylamine (UNISOM) 25 MG TABS Take 1 tablet (25 mg) by mouth At Bedtime (Patient not taking: Reported on 7/20/2018) 30 each 11       ROS:  Const,  as above, otherwise negative       OBJECTIVE:                                                    /82 (BP Location: Right arm, Patient Position: Chair, Cuff Size: Adult Regular)  Pulse 62  Temp 98.4  F (36.9  C) (Oral)  Resp 12  Wt 172 lb (78 kg)  SpO2 96%  BMI 29.52 kg/m2   GENERAL APPEARANCE: healthy, alert and no distress  EYES: Eyes grossly normal to inspection and conjunctivae and sclerae normal  RESP: respirations nonlabored  PSYCH: mentation appears normal and affect normal/bright       Diagnostic test results:  Diagnostic Test Results:  Results for orders placed or performed in visit on 07/20/18 (from the past 24 hour(s))   UA reflex to Microscopic and Culture   Result Value Ref Range    Color Urine Yellow     Appearance Urine Clear     Glucose Urine Negative NEG^Negative mg/dL    Bilirubin Urine Negative NEG^Negative    Ketones Urine Negative NEG^Negative mg/dL    Specific Gravity Urine <=1.005 1.003 - 1.035    Blood Urine Trace (A) NEG^Negative    pH Urine 6.5 5.0 - 7.0 pH    Protein Albumin Urine Negative NEG^Negative mg/dL    Urobilinogen Urine 0.2 0.2 - 1.0 EU/dL    Nitrite Urine Negative NEG^Negative    Leukocyte Esterase Urine Moderate (A) NEG^Negative    Source Urine    Urine  Microscopic   Result Value Ref Range    WBC Urine 5-10 (A) OTO5^0 - 5 /HPF    RBC Urine O - 2 OTO2^O - 2 /HPF    Squamous Epithelial /LPF Urine Few FEW^Few /LPF    Bacteria Urine Moderate (A) NEG^Negative /HPF        ASSESSMENT/PLAN:                                                    (N30.00) Acute cystitis without hematuria  (primary encounter diagnosis)  Comment: + LE, WBC, and bacteria  Plan: nitroFURantoin, macrocrystal-monohydrate,         (MACROBID) 100 MG capsule        Start 7d course macrobid, culture pending.  Reviewed symptomatic cares.  Reviewed UTI prevention     (R30.0) Dysuria  Comment:   Plan: UA reflex to Microscopic and Culture, Urine         Microscopic        As above     (R82.90) Nonspecific finding on examination of urine  Comment:   Plan: Urine Culture Aerobic Bacterial                See Patient Instructions    Holly Will, [facility]    Patient Instructions   1.  For UTI start macrobid 1 pill twice a day for 7 days.  Lots of fluids, over the counter pyridium or azo as needed for symptoms.  Final culture pending.

## 2018-07-20 NOTE — MR AVS SNAPSHOT
After Visit Summary   7/20/2018    Belle Tejada    MRN: [id number]           Patient Information     Date Of Birth          1970        Visit Information        Provider Department      7/20/2018 10:20 AM Holly Will APRN CNP [facility]        Today's Diagnoses     Dysuria    -  1    Nonspecific finding on examination of urine        Acute cystitis without hematuria          Care Instructions    1.  For UTI start macrobid 1 pill twice a day for 7 days.  Lots of fluids, over the counter pyridium or azo as needed for symptoms.  Final culture pending.            Follow-ups after your visit        Who to contact     If you have questions or need follow up information about today's clinic visit or your schedule please contact [facility] directly at [phone].  Normal or non-critical lab and imaging results will be communicated to you by MyChart, letter or phone within 4 business days after the clinic has received the results. If you do not hear from us within 7 days, please contact the clinic through MyChart or phone. If you have a critical or abnormal lab result, we will notify you by phone as soon as possible.  Submit refill requests through [employer] or call your pharmacy and they will forward the refill request to us. Please allow 3 business days for your refill to be completed.          Additional Information About Your Visit        MyChart Information     [employer] gives you secure access to your electronic health record. If you see a primary care provider, you can also send messages to your care team and make appointments. If you have questions, please call your primary care clinic.  If you do not have a primary care provider, please call [phone] and they will assist you.        Care EveryWhere ID     This is your Care EveryWhere ID. This could be used by other organizations to access your [place] medical records  [id number]        Your Vitals  Were     Pulse Temperature Respirations Pulse Oximetry BMI (Body Mass Index)       62 98.4  F (36.9  C) (Oral) 12 96% 29.52 kg/m2        Blood Pressure from Last 3 Encounters:   07/20/18 122/82   06/05/18 107/73   04/27/18 119/72    Weight from Last 3 Encounters:   07/20/18 172 lb (78 kg)   06/05/18 168 lb (76.2 kg)   04/27/18 176 lb (79.8 kg)              We Performed the Following     UA reflex to Microscopic and Culture     Urine Culture Aerobic Bacterial     Urine Microscopic          Today's Medication Changes          These changes are accurate as of 7/20/18 10:51 AM.  If you have any questions, ask your nurse or doctor.               Start taking these medicines.        Dose/Directions    nitroFURantoin (macrocrystal-monohydrate) 100 MG capsule   Commonly known as:  MACROBID   Used for:  Acute cystitis without hematuria   Started by:  Holly Will APRN CNP        Dose:  100 mg   Take 1 capsule (100 mg) by mouth 2 times daily   Quantity:  14 capsule   Refills:  0         These medicines have changed or have updated prescriptions.        Dose/Directions    sertraline 50 MG tablet   Commonly known as:  ZOLOFT   This may have changed:  how much to take   Used for:  Adjustment disorder with anxious mood        Dose:  50 mg   Take 1 tablet (50 mg) by mouth daily   Quantity:  90 tablet   Refills:  3            Where to get your medicines      These medications were sent to [facility] [address] [place] [address] AT [address] [address]    Hours:  24-hours Phone:  [phone]     nitroFURantoin (macrocrystal-monohydrate) 100 MG capsule                Primary Care Provider Office Phone # Fax #    STARR Robert [phone] [address] [place] [address]        Equal Access to Services     DUNIA CONSTANTINO AH: Juanita Hlal, vonnie vogt, qacristina vo,  nola keller eliz dixon'aan ah. So [facility] [phone].    ATENCIÓN: Si habla tito, tiene a irene disposición servicios gratuitos de asistencia lingüística. Nixon fajardo [phone].    We comply with applicable federal civil rights laws and [place] laws. We do not discriminate on the basis of race, color, national origin, age, disability, sex, sexual orientation, or gender identity.            Thank you!     Thank you for choosing [facility]  for your care. Our goal is always to provide you with excellent care. Hearing back from our patients is one way we can continue to improve our services. Please take a few minutes to complete the written survey that you may receive in the mail after your visit with us. Thank you!             Your Updated Medication List - Protect others around you: Learn how to safely use, store and throw away your medicines at www.disposemymeds.org.          This list is accurate as of 7/20/18 10:51 AM.  Always use your most recent med list.                   Brand Name Dispense Instructions for use Diagnosis    desonide 0.05 % lotion    DESOWEN          doxylamine 25 MG Tabs tablet    UNISOM    30 each    Take 1 tablet (25 mg) by mouth At Bedtime    Insomnia       hydrocortisone 0.2 % cream    WESTCORT    30 g    Apply sparingly to affected area three times daily for 14 days.    Rash       levothyroxine 137 MCG tablet    SYNTHROID/LEVOTHROID    90 tablet    Take 1 tablet (137 mcg) by mouth daily . Managed by endocrinology ([facility]).        MIRENA (52 MG) 20 MCG/24HR IUD   Generic drug:  levonorgestrel      1 each by Intrauterine route once        nitroFURantoin (macrocrystal-monohydrate) 100 MG capsule    MACROBID    14 capsule    Take 1 capsule (100 mg) by mouth 2 times daily    Acute cystitis without hematuria       sertraline 50 MG tablet    ZOLOFT    90 tablet    Take 1 tablet (50 mg) by mouth daily    Adjustment disorder with anxious mood

## 2018-07-22 LAB
BACTERIA SPEC CULT: ABNORMAL
BACTERIA SPEC CULT: ABNORMAL
SPECIMEN SOURCE: ABNORMAL

## 2018-07-22 NOTE — PROGRESS NOTES
Belle,    Final culture grew e. Coli infection, we have you on the right antibiotic.    Holly Will, CNP

## 2018-12-04 ENCOUNTER — RADIANT APPOINTMENT (OUTPATIENT)
Dept: MAMMOGRAPHY | Facility: CLINIC | Age: 48
End: 2018-12-04
Attending: NURSE PRACTITIONER
Payer: COMMERCIAL

## 2018-12-04 DIAGNOSIS — Z12.31 VISIT FOR SCREENING MAMMOGRAM: ICD-10-CM

## 2018-12-04 PROCEDURE — 77067 SCR MAMMO BI INCL CAD: CPT

## 2019-06-14 ENCOUNTER — OFFICE VISIT (OUTPATIENT)
Dept: FAMILY MEDICINE | Facility: CLINIC | Age: 49
End: 2019-06-14
Payer: COMMERCIAL

## 2019-06-14 VITALS
SYSTOLIC BLOOD PRESSURE: 132 MMHG | OXYGEN SATURATION: 98 % | TEMPERATURE: 98.4 F | DIASTOLIC BLOOD PRESSURE: 87 MMHG | RESPIRATION RATE: 16 BRPM | WEIGHT: 165 LBS | HEART RATE: 71 BPM | BODY MASS INDEX: 28.32 KG/M2

## 2019-06-14 DIAGNOSIS — R07.0 THROAT PAIN: Primary | ICD-10-CM

## 2019-06-14 DIAGNOSIS — J01.00 ACUTE NON-RECURRENT MAXILLARY SINUSITIS: ICD-10-CM

## 2019-06-14 LAB
DEPRECATED S PYO AG THROAT QL EIA: NORMAL
SPECIMEN SOURCE: NORMAL

## 2019-06-14 PROCEDURE — 99213 OFFICE O/P EST LOW 20 MIN: CPT | Performed by: FAMILY MEDICINE

## 2019-06-14 PROCEDURE — 87081 CULTURE SCREEN ONLY: CPT | Performed by: FAMILY MEDICINE

## 2019-06-14 PROCEDURE — 87880 STREP A ASSAY W/OPTIC: CPT | Performed by: FAMILY MEDICINE

## 2019-06-14 RX ORDER — AMOXICILLIN 875 MG
875 TABLET ORAL 2 TIMES DAILY
Qty: 20 TABLET | Refills: 0 | Status: SHIPPED | OUTPATIENT
Start: 2019-06-14 | End: 2019-06-24

## 2019-06-14 RX ORDER — LEVOTHYROXINE SODIUM 125 UG/1
TABLET ORAL
Refills: 3 | COMMUNITY
Start: 2019-03-29 | End: 2024-06-12

## 2019-06-14 NOTE — PATIENT INSTRUCTIONS
Use oxymetazolin (afrin) nasal spray twice per day for 5 days.     If still not improving - OK to use antibiotics as prescribed.

## 2019-06-14 NOTE — PROGRESS NOTES
Subjective     Belle Tejada is a 48 year old female who presents to clinic today for the following health issues:    HPI   Acute Illness   Acute illness concerns: sore throat   Onset: x 2 months    Fever: no     Chills/Sweats: no     Headache (location?): no     Sinus Pressure:YES    Conjunctivitis:  no    Ear Pain: no but has itchy     Rhinorrhea: no     Congestion: YES    Sore Throat: YES     Cough: YES    Wheeze: no     Decreased Appetite: no     Nausea: YES    Vomiting: no     Diarrhea:  YES    Dysuria/Freq.: no     Fatigue/Achiness: YES    Sick/Strep Exposure: no      Therapies Tried and outcome: ibuprofin    No known strep exposure.     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     Symptoms started in May and then sinus infection and it improved and sore throat came back and it improved and yesterday started again.   Sometime nausea. Busy time at work.   No previous facial surgery.   No smoke exposure.   No gerd symptoms.   Cough - some mucus production.   No allergies.  Thyroid - stable.     Social History     Occupational History     Not on file   Tobacco Use     Smoking status: Never Smoker     Smokeless tobacco: Never Used   Substance and Sexual Activity     Alcohol use: Yes     Comment: 5 drinks per week     Drug use: No     Sexual activity: Yes     Partners: Male     Birth control/protection: Rhythm     Allergies   Allergen Reactions     No Known Drug Allergies      Patient Active Problem List   Diagnosis     Acquired hypothyroidism     Insomnia     Dyspareunia     Orgasm dysfunction     Adjustment disorder with anxious mood     Reviewed medications, social history and  past medical and surgical history.    Review of system: for general, respiratory, CVS, GI and psychiatry negative except for noted above.     EXAM:  /87 (BP Location: Right arm, Patient Position: Sitting, Cuff Size: Adult Regular)   Pulse 71   Temp 98.4  F (36.9  C) (Oral)   Resp 16   Wt 74.8 kg (165  lb)   SpO2 98%   BMI 28.32 kg/m    Constitutional: healthy, alert and no distress   Psychiatric: mentation appears normal and affect normal/bright  Cardiovascular: RRR. No murmurs,  Respiratory: negative, Lungs clear. No crackles or wheezing. No tachypnea.   Head: mild maxillary sinus tenderness bilaterally L>R  ENT: Both TM exam normal, no neck nodes or sinus tenderness, mild nasal turbinate hypertrophy, throat clear    ASSESSMENT / PLAN:  (R07.0) Throat pain  (primary encounter diagnosis)  Comment: Discussed various etiology including bacterial, viral, allergic versus combo.  Due to recurrent infection on and off for the last few months it would be reasonable to do empirical antibiotic if she does not see improvement with Afrin.  She will start Afrin tonight for total of 5 days.  She is traveling and it would be reasonable to keep antibiotics handy.  Side effects discussed.  Plan: Strep, Rapid Screen, Beta strep group A         culture, amoxicillin (AMOXIL) 875 MG tablet             (J01.00) Acute non-recurrent maxillary sinusitis  Comment: See above  Plan: amoxicillin (AMOXIL) 875 MG tablet                  Return for follow up - as needed and yearly..    The above note was dictated using voice recognition. Although reviewed after completion, some word and grammatical error may remain .

## 2019-06-15 LAB
BACTERIA SPEC CULT: NORMAL
SPECIMEN SOURCE: NORMAL

## 2019-12-17 ENCOUNTER — ANCILLARY PROCEDURE (OUTPATIENT)
Dept: MAMMOGRAPHY | Facility: CLINIC | Age: 49
End: 2019-12-17
Attending: NURSE PRACTITIONER
Payer: COMMERCIAL

## 2019-12-17 DIAGNOSIS — Z12.31 VISIT FOR SCREENING MAMMOGRAM: ICD-10-CM

## 2019-12-17 PROCEDURE — 77067 SCR MAMMO BI INCL CAD: CPT

## 2020-07-15 ENCOUNTER — VIRTUAL VISIT (OUTPATIENT)
Dept: FAMILY MEDICINE | Facility: CLINIC | Age: 50
End: 2020-07-15
Payer: COMMERCIAL

## 2020-07-15 DIAGNOSIS — A69.20 ERYTHEMA MIGRANS (LYME DISEASE): Primary | ICD-10-CM

## 2020-07-15 PROCEDURE — 99214 OFFICE O/P EST MOD 30 MIN: CPT | Mod: 95 | Performed by: FAMILY MEDICINE

## 2020-07-15 RX ORDER — DOXYCYCLINE 100 MG/1
100 CAPSULE ORAL 2 TIMES DAILY
Qty: 20 CAPSULE | Refills: 0 | Status: SHIPPED | OUTPATIENT
Start: 2020-07-15 | End: 2021-04-14

## 2020-07-15 NOTE — PROGRESS NOTES
"Belle Tejada is a 49 year old female who is being evaluated via a billable video visit.      The patient has been notified of following:   3  \"This video visit will be conducted via a call between you and your physician/provider. We have found that certain health care needs can be provided without the need for an in-person physical exam.  This service lets us provide the care you need with a video conversation.  If a prescription is necessary we can send it directly to your pharmacy.  If lab work is needed we can place an order for that and you can then stop by our lab to have the test done at a later time.    Video visits are billed at different rates depending on your insurance coverage.  Please reach out to your insurance provider with any questions.    If during the course of the call the physician/provider feels a video visit is not appropriate, you will not be charged for this service.\"    Patient has given verbal consent for Video visit? Yes  How would you like to obtain your AVS? MyChart  If you are dropped from the video visit, the video invite should be resent to: Text to cell phone: [phone]  Will anyone else be joining your video visit? No      Subjective     Belle Tejada is a 49 year old female who presents today via video visit for the following health issues:    HPI      Insect/bug bite-     Onset: 3 day(s) ago     Description:        Type of insect: deer tick        Location of bite: behind left knee and has bullseye appearance    Accompanying Signs & Symptoms:        Itching: YES- stopped now              Redness: YES and feels stiffness        Warmth: YES        Swelling: no        Pain: mild        Drainage: no        Fever: no        Chest Pain: no        Shortness of breath: no                              History of allergic reactions:    Anaphylaxis: no  Hives: no       If so, did you have/use an epi-pen:  not applicable    Therapies tried and outcome: cortisone cream with minor " "relief    Video Start Time: 2:24 PM    Work from home fairly frequently.     Went to visit a friend in [place].   Behind left knee - rash. Did not see tick but saw rash.       Reviewed and updated as needed this visit by Provider         Review of Systems   Constitutional, HEENT, cardiovascular, pulmonary, gi and gu systems are negative, except as otherwise noted.      Objective    Vitals - Patient Reported  Weight (Patient Reported): 74.8 kg (165 lb)  Height (Patient Reported): 162.6 cm (5' 4\")  BMI (Based on Pt Reported Ht/Wt): 28.32        Physical Exam     GENERAL: Healthy, alert and no distress  EYES: Eyes grossly normal to inspection.  No discharge or erythema, or obvious scleral/conjunctival abnormalities.  RESP: No audible wheeze, cough, or visible cyanosis.  No visible retractions or increased work of breathing.    SKIN: Visible skin clear. No significant rash, abnormal pigmentation or lesions.  NEURO: Cranial nerves grossly intact.  Mentation and speech appropriate for age.  PSYCH: Mentation appears normal, affect normal/bright, judgement and insight intact, normal speech and appearance well-groomed.  Image reviewed -left popliteal fossa are large erythema migrans type of lesion. (submitted via [employer])            Assessment & Plan     1. Erythema migrans (Lyme disease)  Although she does not have a known tick bite but living in [place] and having erythema migran type of lesion.  We will empirically treat her with doxycycline for 10 days.  We discussed about side effects.  She understood.  We will hold off on Lyme testing as it may not be as helpful.  - doxycycline hyclate (VIBRAMYCIN) 100 MG capsule; Take 1 capsule (100 mg) by mouth 2 times daily  Dispense: 20 capsule; Refill: 0    Familia Dunn MD, MD  [facility]      Video-Visit Details    Type of service:  Video Visit    Video End Time:2:43 PM    Originating Location (pt. Location): Home    Distant Location (provider " location):  home    Platform used for Video Visit: Joseph    No follow-ups on file.       Familia Dunn MD, MD

## 2020-11-16 ENCOUNTER — HEALTH MAINTENANCE LETTER (OUTPATIENT)
Age: 50
End: 2020-11-16

## 2021-04-04 ENCOUNTER — HEALTH MAINTENANCE LETTER (OUTPATIENT)
Age: 51
End: 2021-04-04

## 2021-04-06 ENCOUNTER — ANCILLARY PROCEDURE (OUTPATIENT)
Dept: MAMMOGRAPHY | Facility: CLINIC | Age: 51
End: 2021-04-06
Attending: NURSE PRACTITIONER
Payer: COMMERCIAL

## 2021-04-06 DIAGNOSIS — Z12.31 VISIT FOR SCREENING MAMMOGRAM: ICD-10-CM

## 2021-04-06 PROCEDURE — 77067 SCR MAMMO BI INCL CAD: CPT | Mod: 26 | Performed by: STUDENT IN AN ORGANIZED HEALTH CARE EDUCATION/TRAINING PROGRAM

## 2021-04-06 PROCEDURE — 77067 SCR MAMMO BI INCL CAD: CPT

## 2021-04-14 ENCOUNTER — OFFICE VISIT (OUTPATIENT)
Dept: FAMILY MEDICINE | Facility: CLINIC | Age: 51
End: 2021-04-14
Payer: COMMERCIAL

## 2021-04-14 VITALS
TEMPERATURE: 97.1 F | HEIGHT: 64 IN | OXYGEN SATURATION: 98 % | BODY MASS INDEX: 27.83 KG/M2 | RESPIRATION RATE: 16 BRPM | HEART RATE: 65 BPM | WEIGHT: 163 LBS | SYSTOLIC BLOOD PRESSURE: 120 MMHG | DIASTOLIC BLOOD PRESSURE: 76 MMHG

## 2021-04-14 DIAGNOSIS — Z13.1 SCREENING FOR DIABETES MELLITUS: ICD-10-CM

## 2021-04-14 DIAGNOSIS — Z13.220 SCREENING FOR HYPERLIPIDEMIA: ICD-10-CM

## 2021-04-14 DIAGNOSIS — Z12.11 SCREENING FOR COLON CANCER: ICD-10-CM

## 2021-04-14 DIAGNOSIS — Z80.0 FAMILY HISTORY OF PANCREATIC CANCER: ICD-10-CM

## 2021-04-14 DIAGNOSIS — Z12.4 SCREENING FOR CERVICAL CANCER: ICD-10-CM

## 2021-04-14 DIAGNOSIS — Z13.0 SCREENING FOR IRON DEFICIENCY ANEMIA: ICD-10-CM

## 2021-04-14 DIAGNOSIS — Z00.00 ROUTINE GENERAL MEDICAL EXAMINATION AT A HEALTH CARE FACILITY: Primary | ICD-10-CM

## 2021-04-14 LAB
CHOLEST SERPL-MCNC: 257 MG/DL
GLUCOSE SERPL-MCNC: 101 MG/DL (ref 70–99)
HDLC SERPL-MCNC: 74 MG/DL
HGB BLD-MCNC: 13.6 G/DL (ref 11.7–15.7)
LDLC SERPL CALC-MCNC: 169 MG/DL
NONHDLC SERPL-MCNC: 183 MG/DL
TRIGL SERPL-MCNC: 72 MG/DL

## 2021-04-14 PROCEDURE — 99396 PREV VISIT EST AGE 40-64: CPT | Performed by: NURSE PRACTITIONER

## 2021-04-14 PROCEDURE — 85018 HEMOGLOBIN: CPT | Performed by: NURSE PRACTITIONER

## 2021-04-14 PROCEDURE — 82947 ASSAY GLUCOSE BLOOD QUANT: CPT | Performed by: NURSE PRACTITIONER

## 2021-04-14 PROCEDURE — 36415 COLL VENOUS BLD VENIPUNCTURE: CPT | Performed by: NURSE PRACTITIONER

## 2021-04-14 PROCEDURE — 87624 HPV HI-RISK TYP POOLED RSLT: CPT | Performed by: NURSE PRACTITIONER

## 2021-04-14 PROCEDURE — 80061 LIPID PANEL: CPT | Performed by: NURSE PRACTITIONER

## 2021-04-14 PROCEDURE — G0145 SCR C/V CYTO,THINLAYER,RESCR: HCPCS | Performed by: NURSE PRACTITIONER

## 2021-04-14 ASSESSMENT — ENCOUNTER SYMPTOMS
DIARRHEA: 0
PALPITATIONS: 0
NAUSEA: 0
CHILLS: 0
HEADACHES: 0
DIZZINESS: 0
BREAST MASS: 0
HEMATOCHEZIA: 0
ABDOMINAL PAIN: 0
EYE PAIN: 0
HEMATURIA: 0
SORE THROAT: 0
HEARTBURN: 0
FEVER: 0
COUGH: 0
DYSURIA: 0
WEAKNESS: 0
SHORTNESS OF BREATH: 0
FREQUENCY: 0
NERVOUS/ANXIOUS: 0
JOINT SWELLING: 0
PARESTHESIAS: 0
ARTHRALGIAS: 0
CONSTIPATION: 0
MYALGIAS: 0

## 2021-04-14 ASSESSMENT — MIFFLIN-ST. JEOR: SCORE: 1334.36

## 2021-04-14 NOTE — PROGRESS NOTES
SUBJECTIVE:   CC: Belle Tejada is an 50 year old woman who presents for preventive health visit.       Healthy Habits:     Getting at least 3 servings of Calcium per day:  Yes    Bi-annual eye exam:  Yes    Dental care twice a year:  Yes    Sleep apnea or symptoms of sleep apnea:  None    Diet:  Carbohydrate counting    Frequency of exercise:  4-5 days/week    Duration of exercise:  30-45 minutes    Taking medications regularly:  Yes    Medication side effects:  None    PHQ-2 Total Score: 0    Additional concerns today:  Yes      Today's PHQ-2 Score:   PHQ-2 ( 1999 [employer]) 4/14/2021   Q1: Little interest or pleasure in doing things 0   Q2: Feeling down, depressed or hopeless 0   PHQ-2 Score 0   Q1: Little interest or pleasure in doing things Not at all   Q2: Feeling down, depressed or hopeless Not at all   PHQ-2 Score 0       Abuse: Current or Past (Physical, Sexual or Emotional) - No  Do you feel safe in your environment? Yes    Have you ever done Advance Care Planning? (For example, a Health Directive, POLST, or a discussion with a medical provider or your loved ones about your wishes): No, advance care planning information given to patient to review.  Patient plans to discuss their wishes with loved ones or provider.      Social History     Tobacco Use     Smoking status: Never Smoker     Smokeless tobacco: Never Used   Substance Use Topics     Alcohol use: Yes     Comment: 5 drinks per week     If you drink alcohol do you typically have >3 drinks per day or >7 drinks per week? No    Alcohol Use 4/14/2021   Prescreen: >3 drinks/day or >7 drinks/week? No   Prescreen: >3 drinks/day or >7 drinks/week? -   No flowsheet data found.    Reviewed orders with patient.  Reviewed health maintenance and updated orders accordingly - Yes  Lab work is in process  Labs reviewed in EPIC  BP Readings from Last 3 Encounters:   04/14/21 120/76   06/14/19 132/87   07/20/18 122/82    Wt Readings from Last 3 Encounters:    21 73.9 kg (163 lb)   19 74.8 kg (165 lb)   18 78 kg (172 lb)               Patient Active Problem List   Diagnosis     Acquired hypothyroidism     Insomnia     Dyspareunia     Orgasm dysfunction     Adjustment disorder with anxious mood     Past Surgical History:   Procedure Laterality Date     [facility] NONSPECIFIC PROCEDURE  2005         [facility] NONSPECIFIC PROCEDURE  10/78    Tonsillectomy       Social History     Tobacco Use     Smoking status: Never Smoker     Smokeless tobacco: Never Used   Substance Use Topics     Alcohol use: Yes     Comment: 5 drinks per week     Family History   Problem Relation Age of Onset     Hypertension Mother      Breast Cancer Mother         58     Other Cancer Mother         pancreatic CA with mets     Neurologic Disorder Father         migraines + Menieres     Connective Tissue Disorder Father         ichtyosis     Alcohol/Drug Maternal Grandmother         ETOH     Respiratory Maternal Grandfather         emphysema     C.A.D. Paternal Grandmother      Other - See Comments Son         ichthyosis         Current Outpatient Medications   Medication Sig Dispense Refill     hydrocortisone (WESTCORT) 0.2 % cream Apply sparingly to affected area three times daily for 14 days. 30 g 0     levonorgestrel (MIRENA, 52 MG,) 20 MCG/24HR IUD 1 each by Intrauterine route once       levothyroxine (SYNTHROID/LEVOTHROID) 125 MCG tablet   3     Allergies   Allergen Reactions     No Known Drug Allergies      Recent Labs   Lab Test 16  0845 05/13/15  1513 10/29/13  1335 13  1132   * 139*  --  102   HDL 73 77  --  84   TRIG 46 47  --  59   ALT  --   --  25  --    CR  --   --  0.80  --    GFRESTIMATED  --   --  78  --    GFRESTBLACK  --   --  >90  --    POTASSIUM  --   --  4.1  --    TSH  --  1.77 2.50  --         Breast Cancer Screening:  Done 2021.    FSH-7:   Breast CA Risk Assessment (FHS-7) 2021   Did any of your first-degree relatives have  breast or ovarian cancer? Yes   Did any of your relatives have bilateral breast cancer? Unknown   Did any man in your family have breast cancer? No   Did any woman in your family have breast and ovarian cancer? No     click delete button to remove this line now  Mammogram Screening: Recommended annual mammography  Pertinent mammograms are reviewed under the imaging tab.    History of abnormal Pap smear:   Last 3 Pap and HPV Results:   PAP / HPV Latest Ref Rng & Units 8/12/2016 5/2/2013 8/29/2008   PAP - OTHER-NIL, See Result NIL NIL   HPV 16 DNA NEG Negative - -   HPV 18 DNA NEG Negative - -   OTHER HR HPV NEG Negative - -     PAP / HPV Latest Ref Rng & Units 8/12/2016 5/2/2013 8/29/2008   PAP - OTHER-NIL, See Result NIL NIL   HPV 16 DNA NEG Negative - -   HPV 18 DNA NEG Negative - -   OTHER HR HPV NEG Negative - -   Mirena IUD  Periods are non-existent  IUD was placed 7/2017.  Amenorrhea.    Reviewed and updated as needed this visit by clinical staff  Tobacco  Allergies  Meds   Med Hx  Surg Hx  Fam Hx  Soc Hx        Reviewed and updated as needed this visit by Provider                    Mom had pancreatic cancer.  Belle is considering genetic screening screening.    Colon cancer screening: now due.    Review of Systems   Constitutional: Negative for chills and fever.   HENT: Negative for congestion, ear pain, hearing loss and sore throat.    Eyes: Negative for pain and visual disturbance.   Respiratory: Negative for cough and shortness of breath.    Cardiovascular: Negative for chest pain, palpitations and peripheral edema.   Gastrointestinal: Negative for abdominal pain, constipation, diarrhea, heartburn, hematochezia and nausea.   Breasts:  Negative for tenderness, breast mass and discharge.   Genitourinary: Negative for dysuria, frequency, genital sores, hematuria, pelvic pain, urgency, vaginal bleeding and vaginal discharge.   Musculoskeletal: Negative for arthralgias, joint swelling and myalgias.  "  Skin: Negative for rash.   Neurological: Negative for dizziness, weakness, headaches and paresthesias.   Psychiatric/Behavioral: Negative for mood changes. The patient is not nervous/anxious.         OBJECTIVE:   /76 (BP Location: Left arm, Patient Position: Sitting, Cuff Size: Adult Regular)   Pulse 65   Temp 97.1  F (36.2  C) (Temporal)   Resp 16   Ht 1.626 m (5' 4\")   Wt 73.9 kg (163 lb)   SpO2 98%   BMI 27.98 kg/m    Physical Exam  GENERAL: healthy, alert and no distress  EYES: Eyes grossly normal to inspection, PERRL and conjunctivae and sclerae normal  HENT: ear canals and TM's normal, nose and mouth without ulcers or lesions  NECK: no adenopathy, no asymmetry, masses, or scars and thyroid normal to palpation  RESP: lungs clear to auscultation - no rales, rhonchi or wheezes  BREAST: normal without masses, tenderness or nipple discharge and no palpable axillary masses or adenopathy  CV: regular rate and rhythm, normal S1 S2, no S3 or S4, no murmur, click or rub, no peripheral edema and peripheral pulses strong  ABDOMEN: soft, nontender, no hepatosplenomegaly, no masses and bowel sounds normal   (female): normal female external genitalia, normal urethral meatus, vaginal mucosa pink, moist, well rugated, and normal cervix/adnexa/uterus without masses or discharge. IUD strings visible.   MS: no gross musculoskeletal defects noted, no edema  SKIN: no suspicious lesions or rashes  NEURO: Normal strength and tone, mentation intact and speech normal  PSYCH: mentation appears normal, affect normal/bright    Diagnostic Test Results:  Labs reviewed in Epic  Labs done today, results pending    ASSESSMENT/PLAN:   (Z00.00) Routine general medical examination at a health care facility  (primary encounter diagnosis)  Comment:   Plan: Pap imaged thin layer screen with HPV -         recommended age 30 - 65, HPV High Risk Types         DNA Cervical, Lipid panel reflex to direct LDL         Fasting, Hemoglobin, " "Glucose            (Z12.4) Screening for cervical cancer  Comment:   Plan: Pap imaged thin layer screen with HPV -         recommended age 30 - 65, HPV High Risk Types         DNA Cervical        Done today    (Z13.1) Screening for diabetes mellitus  Comment:   Plan: Glucose        Done today    (Z13.0) Screening for iron deficiency anemia  Comment:   Plan: Hemoglobin        Done today    (Z13.220) Screening for hyperlipidemia  Comment:   Plan: Lipid panel reflex to direct LDL Fasting        Done today    (Z12.11) Screening for colon cancer  Comment:   Plan: GASTROENTEROLOGY ADULT REF PROCEDURE ONLY        Routine screening colonoscopy at earliest convenience    (Z80.0) Family history of pancreatic cancer  Comment: Uncertain  Plan: CANCER RISK MGMT/CANCER GENETIC COUNSELING         REFERRAL        She will proceed with a genetics counseling appointment for risk factor stratification.      Patient has been advised of split billing requirements and indicates understanding: Yes  COUNSELING:  Reviewed preventive health counseling, as reflected in patient instructions    Estimated body mass index is 27.98 kg/m  as calculated from the following:    Height as of this encounter: 1.626 m (5' 4\").    Weight as of this encounter: 73.9 kg (163 lb).        She reports that she has never smoked. She has never used smokeless tobacco.      Counseling Resources:  ATP IV Guidelines  Pooled Cohorts Equation Calculator  Breast Cancer Risk Calculator  BRCA-Related Cancer Risk Assessment: FHS-7 Tool  FRAX Risk Assessment  ICSI Preventive Guidelines  Dietary Guidelines for Americans, 2010  USDA's MyPlate  ASA Prophylaxis  Lung CA Screening    Pam Rai, STARR [facility]  [facility]  "

## 2021-04-15 NOTE — RESULT ENCOUNTER NOTE
Elinor Odonnell,    This note is to let you know the results of your recent lab studies.    Your blood count/hemoglobin is normal.    Your blood sugar did come back slightly above the normal limit of 99 at 101.  I am not overly worried about this.  I do recommend that you eat a diet with fewer simple carbohydrates and sugars, get regular exercise, and I will recheck your blood sugar in 1 year.    Your cholesterol panel is slightly worse than it was a few years ago.  Your total cholesterol and LDL cholesterol, bad fat, are elevated.  The good news is good here however.  Your triglycerides and HDL cholesterols are excellent.  Because of this, you do not need cholesterol medication.  I would recommend that you eat a diet with fewer bad fats, get regular exercise, and I will recheck your cholesterol panel in 1 year.  Let me know if you have any other questions about this.    It was great seeing you again.  Have a great day!    aPm CLEMENTS CNP

## 2021-04-16 ENCOUNTER — TELEPHONE (OUTPATIENT)
Dept: GASTROENTEROLOGY | Facility: CLINIC | Age: 51
End: 2021-04-16

## 2021-04-16 NOTE — TELEPHONE ENCOUNTER
Patient is scheduled for COLONOSCOPY with Dr. FULLER    Spoke with: AMINA    Date of Procedure: 06/07/2021    Location: [facility]    Sedation Type CS    Conscious Sedation- Needs  for 6 hours after the procedure  MAC/General-Needs  for 24 hours after procedure    Pre-op for Unit [address] MAC and OR NOT NEEDED    (if yes advise patient they will need a pre-op prior to procedure)      Is patient on blood thinners? -NO (If yes- inform patient to follow up with PCP or provider for follow up instructions)     Informed patient they will need an adult  YES  Cannot take any type of public or medical transportation alone    Informed Patient of COVID Test Requirement YES    Preferred Pharmacy for Pre Prescription NA    Confirmed Nurse will call to complete assessment YES    Additional comments: NA

## 2021-04-17 LAB
COPATH REPORT: NORMAL
PAP: NORMAL

## 2021-04-20 ENCOUNTER — PATIENT OUTREACH (OUTPATIENT)
Dept: FAMILY MEDICINE | Facility: CLINIC | Age: 51
End: 2021-04-20

## 2021-04-20 PROBLEM — R87.810 CERVICAL HIGH RISK HPV (HUMAN PAPILLOMAVIRUS) TEST POSITIVE: Status: ACTIVE | Noted: 2021-04-14

## 2021-04-20 LAB
FINAL DIAGNOSIS: ABNORMAL
HPV HR 12 DNA CVX QL NAA+PROBE: NEGATIVE
HPV16 DNA SPEC QL NAA+PROBE: NEGATIVE
HPV18 DNA SPEC QL NAA+PROBE: POSITIVE
SPECIMEN DESCRIPTION: ABNORMAL
SPECIMEN SOURCE CVX/VAG CYTO: ABNORMAL

## 2021-05-11 ENCOUNTER — VIRTUAL VISIT (OUTPATIENT)
Dept: ONCOLOGY | Facility: CLINIC | Age: 51
End: 2021-05-11
Attending: GENETIC COUNSELOR, MS
Payer: COMMERCIAL

## 2021-05-11 DIAGNOSIS — Z80.3 FAMILY HISTORY OF MALIGNANT NEOPLASM OF BREAST: Primary | ICD-10-CM

## 2021-05-11 DIAGNOSIS — Z80.0 FAMILY HISTORY OF PANCREATIC CANCER: ICD-10-CM

## 2021-05-11 PROCEDURE — 96040 HC GENETIC COUNSELING, EACH 30 MINUTES: CPT | Mod: 95,GT | Performed by: GENETIC COUNSELOR, MS

## 2021-05-11 NOTE — PROGRESS NOTES
5/11/2021    Belle is a 50 year old who is being evaluated via a billable video visit.      How would you like to obtain your AVS? MyChart  If the video visit is dropped, the invitation should be resent by: Text to cell phone: [phone]  Will anyone else be joining your video visit? No    Video-Visit Details  Type of service:  Video Visit  Video Start Time: 2:13pm  Video End Time:3:18pm  Originating Location (pt. Location): Home  Distant Location (provider location):  [facility] CANCER [facility]   Platform used for Video Visit: Adly    Referring Provider: STARR Levy CNP    Presenting Information:   Given concerns regarding the potential for COVID-19 exposure during a clinic visit, Belle elected for a video genetic counseling visit through the Cancer Risk Management Program to discuss her family history of breast and pancreatic cancer. We reviewed this history, cancer screening recommendations, and available genetic testing options.    Personal History:  Belle is a 50 year old female. She does not have any personal history of cancer.    She had her first menstrual period at age 10, her first child at age 30, and is likely perimenopausal. Belle has her ovaries, fallopian tubes and uterus in place. She had a transvaginal ultrasound in July 2017 to check the placement of her IUD, which identified a small fibroid and simple cyst of her left ovary. She reports that she has never used hormone replacement therapy.      She has annual mammograms and her most recent mammogram in April 2021 was normal. Belle has not yet had a colonoscopy, but one is schedule in June 2021. She does not regularly do any other cancer screening at this time.    Family History: (Please see scanned pedigree for detailed family history information)    Belle's mother was first diagnosed with breast cancer at age 58 and was treated with a lumpectomy, chemotherapy, and radiation. She was then diagnosed with  pancreatic cancer at age 75 before passing away at age 76. No genetic testing was pursued.    One maternal aunt's daughter was diagnosed with thyroid cancer as a teenager and was treated with surgery, as well as radiation.    Belle's paternal grandfather was diagnosed with lung cancer and passed away at age 89; he had a history of smoking.    Of note, Belle reports that her youngest son and father both have a mild form of ichthyosis. Neither individual has undergone a formal genetics evaluation, though. She also reports that screening performed during her pregnancy with her youngest son suggested her son had Trisomy 18, but the follow-up amniocentesis was normal. Though this history is unlikely to be associated with her family history of cancer, I offered to speak to my colleagues in the pediatric genetics/dermatology clinics regarding any recommendations that may be appropriate for Belle and/or her son.    Her maternal ethnicity is Western , Japanese, and possibly Brazilian. Her paternal ethnicity is Western , English, and Occitan. There is no known Ashkenazi Tenriism ancestry on either side of her family.    Discussion:    Belle's family history of breast and pancreatic cancer is suggestive of a hereditary cancer syndrome.    We reviewed the features of sporadic, familial, and hereditary cancers. In looking at Belle's family history, it is possible that a cancer susceptibility gene is present as her mother was diagnosed with two related primary cancers; her maternal first cousin was also diagnosed with thyroid cancer in her teens, though there is less information regarding a genetic association between thyroid, pancreatic, and breast cancer. Of note, Belle's paternal family history is currently most consistent with sporadic cancer given the cancer types and associated risk factors (i.e. lung cancer and smoking).     We discussed the natural history and genetics of several hereditary breast and/or  pancreatic cancer syndromes, including Hereditary Breast and Ovarian Cancer (HBOC) syndrome.     We reviewed that the most common cause of hereditary breast and pancreatic cancer is HBOC syndrome, which is caused by mutations in the BRCA1 and BRCA2 genes. Individuals with HBOC syndrome are at increased risk for several different cancers, including breast, ovarian, male breast, prostate, melanoma, and pancreatic cancer.    We discussed that there are additional genes that could cause increased risk for breast, pancreatic, and related cancers. As many of these genes present with overlapping features in a family and accurate cancer risk cannot always be established based upon the pedigree analysis alone, it would be reasonable for Belle to consider panel genetic testing to analyze multiple genes at once.    Based on her family history, Belle meets current National Comprehensive Cancer Network (NCCN) criteria for genetic testing of BRCA1, BRCA2, and other high penetrance breast and/or pancreatic cancer genes (i.e. SEBASTIAN, CDH1, CDKN2A, PALB2, PTEN, STK11, TP53, Connelly syndrome genes, etc.).      A detailed handout regarding these genes/syndromes and the information we discussed was provided to Belle via [employer] and can be found in the after visit summary. Topics included: inheritance pattern, cancer risks, cancer screening recommendations, and also risks, benefits and limitations of testing.    We discussed that genetic testing for cancer susceptibility genes is typically most informative, though, when it is first performed on a family member with a personal history of cancer. Testing is available to Belle, but with limitations. If Belle pursues testing at this time and receives a negative result, this does not rule out the possibility of a hereditary cancer syndrome in her and/or her family. Despite these limitations and given that her mother has passed away, Belle expressed interest in proceeding with testing  herself.    We reviewed genetic testing options for hereditary breast, pancreatic, and related cancers: actionable breast/gynecologic/pancreatic cancer panel ([facility] Breast and Gyn Cancers Guidelines-Based Panel + Pancreatic Cancer Panel + BARD1 Gene) and expanded multi-cancer panels (such as the [employer] Common Hereditary Cancers Panel). Belle expressed interest in only the actionable/targeted genes. She opted for the [facility] Breast and Gyn Cancers Guidelines-Based Panel + Pancreatic Cancer Panel + BARD1 Gene.    This panel includes the following 28 genes: APC, SEBASTIAN, BARD1, BMPR1A, BRCA1, BRCA2, BRIP1, CDH1, CDKN2A, CHEK2, EPCAM, MEN1, MLH1, MSH2, MSH6, NBN, NF1, PALB2, PMS2, PTEN, RAD51C, RAD51D, SMAD4, STK11, TP53, TSC1, TSC2, and VHL.      We discussed that many of these genes are associated with specific hereditary cancer syndromes and published management guidelines: Hereditary Breast and Ovarian Cancer syndrome (BRCA1, BRCA2), Connelly syndrome (MLH1, MSH2, MSH6, PMS2, EPCAM), Familial Adenomatous Polyposis (APC), Hereditary Diffuse Gastric Cancer (CDH1), Familial Atypical Multiple Mole Melanoma syndrome (CDKN2A), Multiple Endocrine Neoplasia type 1 (MEN1), Juvenile Polyposis syndrome (BMPR1A, SMAD4), Cowden syndrome (PTEN), Li Fraumeni syndrome (TP53), Peutz-Jeghers syndrome (STK11), Tuberous sclerosis complex (TSC1, TSC2), Von Hippel-Lindau disease (VHL), and Neurofibromatosis type 1 (NF1).     The SEBASTIAN, BARD1, BRIP1, CHEK2, NBN, PALB2, RAD51C, and RAD51D genes are associated with increased cancer risk and have published management guidelines for certain cancers.     Consent was obtained over the video. Belle elected to have a saliva collection kit shipped to her home directly. Once her saliva sample is collected, genetic testing using the Breast and Gyn Cancers Guidelines-Based Panel + Pancreatic Cancer Panel + BARD1 Gene will be sent to [facility]. Turn around time: 2-3 weeks after [facility] receives  her saliva sample.    Medical Management: For Belle, we reviewed that the information from genetic testing may determine:    additional cancer screening for which Belle may qualify (i.e. mammogram and breast MRI, regular abdominal imaging, more frequent colonoscopies, more frequent dermatologic exams, etc.),    options for risk reducing surgeries Belle could consider (i.e. bilateral mastectomy, surgery to remove her ovaries and/or uterus, etc.),      and targeted chemotherapies if she were to develop certain cancers in the future (i.e. immunotherapy for individuals with Connelly syndrome, PARP inhibitors, etc.).     These recommendations and possible targeted chemotherapies will be discussed in detail once genetic testing is completed.     Plan:  1) Today Belle elected to proceed with genetic testing using the [facility] Breast and Gyn Cancers Guidelines-Based Panel + Pancreatic Cancer Panel + BARD1 Gene from [facility]. A saliva collection kit will be shipped to her home directly.  2) The results should be available 2-3 weeks after [facility] receives her saliva sample.  3) I will contact Belle by either video or telephone to discuss the results.    Florence Lofton MS, [facility]  Licensed Genetic Counselor  Office: [phone]  Pager: [phone]

## 2021-05-11 NOTE — LETTER
5/11/2021         RE: Belle Tejada  [address] [facility] [address]        Dear Colleague,    Thank you for referring your patient, Belle Tejada, to the [facility]. Please see a copy of my visit note below.    5/11/2021    Belle is a 50 year old who is being evaluated via a billable video visit.      How would you like to obtain your AVS? MyChart  If the video visit is dropped, the invitation should be resent by: Text to cell phone: [phone]  Will anyone else be joining your video visit? No    Video-Visit Details  Type of service:  Video Visit  Video Start Time: 2:13pm  Video End Time:3:18pm  Originating Location (pt. Location): Home  Distant Location (provider location):  [facility]   Platform used for Video Visit: [employer]    Referring Provider: STARR Levy CNP    Presenting Information:   Given concerns regarding the potential for COVID-19 exposure during a clinic visit, Belle elected for a video genetic counseling visit through the Cancer Risk Management Program to discuss her family history of breast and pancreatic cancer. We reviewed this history, cancer screening recommendations, and available genetic testing options.    Personal History:  Belle is a 50 year old female. She does not have any personal history of cancer.    She had her first menstrual period at age 10, her first child at age 30, and is likely perimenopausal. Belle has her ovaries, fallopian tubes and uterus in place. She had a transvaginal ultrasound in July 2017 to check the placement of her IUD, which identified a small fibroid and simple cyst of her left ovary. She reports that she has never used hormone replacement therapy.      She has annual mammograms and her most recent mammogram in April 2021 was normal. Belle has not yet had a colonoscopy, but one is schedule in June 2021. She does not regularly do any other cancer screening at  this time.    Family History: (Please see scanned pedigree for detailed family history information)    Belle's mother was first diagnosed with breast cancer at age 58 and was treated with a lumpectomy, chemotherapy, and radiation. She was then diagnosed with pancreatic cancer at age 75 before passing away at age 76. No genetic testing was pursued.    One maternal aunt's daughter was diagnosed with thyroid cancer as a teenager and was treated with surgery, as well as radiation.    Belle's paternal grandfather was diagnosed with lung cancer and passed away at age 89; he had a history of smoking.    Of note, Belle reports that her youngest son and father both have a mild form of ichthyosis. Neither individual has undergone a formal genetics evaluation, though. She also reports that screening performed during her pregnancy with her youngest son suggested her son had Trisomy 18, but the follow-up amniocentesis was normal. Though this history is unlikely to be associated with her family history of cancer, I offered to speak to my colleagues in the pediatric genetics/dermatology clinics regarding any recommendations that may be appropriate for Belle and/or her son.    Her maternal ethnicity is Western , Nepali, and possibly Malagasy. Her paternal ethnicity is Western , English, and Nepali. There is no known Ashkenazi Spiritism ancestry on either side of her family.    Discussion:    Belle's family history of breast and pancreatic cancer is suggestive of a hereditary cancer syndrome.    We reviewed the features of sporadic, familial, and hereditary cancers. In looking at Belle's family history, it is possible that a cancer susceptibility gene is present as her mother was diagnosed with two related primary cancers; her maternal first cousin was also diagnosed with thyroid cancer in her teens, though there is less information regarding a genetic association between thyroid, pancreatic, and breast cancer. Of  note, Belle's paternal family history is currently most consistent with sporadic cancer given the cancer types and associated risk factors (i.e. lung cancer and smoking).     We discussed the natural history and genetics of several hereditary breast and/or pancreatic cancer syndromes, including Hereditary Breast and Ovarian Cancer (HBOC) syndrome.     We reviewed that the most common cause of hereditary breast and pancreatic cancer is HBOC syndrome, which is caused by mutations in the BRCA1 and BRCA2 genes. Individuals with HBOC syndrome are at increased risk for several different cancers, including breast, ovarian, male breast, prostate, melanoma, and pancreatic cancer.    We discussed that there are additional genes that could cause increased risk for breast, pancreatic, and related cancers. As many of these genes present with overlapping features in a family and accurate cancer risk cannot always be established based upon the pedigree analysis alone, it would be reasonable for Belle to consider panel genetic testing to analyze multiple genes at once.    Based on her family history, Belle meets current National Comprehensive Cancer Network (NCCN) criteria for genetic testing of BRCA1, BRCA2, and other high penetrance breast and/or pancreatic cancer genes (i.e. SEBASTIAN, CDH1, CDKN2A, PALB2, PTEN, STK11, TP53, Connelly syndrome genes, etc.).      A detailed handout regarding these genes/syndromes and the information we discussed was provided to Belle via [employer] and can be found in the after visit summary. Topics included: inheritance pattern, cancer risks, cancer screening recommendations, and also risks, benefits and limitations of testing.    We discussed that genetic testing for cancer susceptibility genes is typically most informative, though, when it is first performed on a family member with a personal history of cancer. Testing is available to Belle, but with limitations. If Belle pursues testing at this time  and receives a negative result, this does not rule out the possibility of a hereditary cancer syndrome in her and/or her family. Despite these limitations and given that her mother has passed away, Belle expressed interest in proceeding with testing herself.    We reviewed genetic testing options for hereditary breast, pancreatic, and related cancers: actionable breast/gynecologic/pancreatic cancer panel ([employer] Breast and Gyn Cancers Guidelines-Based Panel + Pancreatic Cancer Panel + BARD1 Gene) and expanded multi-cancer panels (such as the [employer] Common Hereditary Cancers Panel). Belle expressed interest in only the actionable/targeted genes. She opted for the [employer] Breast and Gyn Cancers Guidelines-Based Panel + Pancreatic Cancer Panel + BARD1 Gene.    This panel includes the following 28 genes: APC, SEBASTIAN, BARD1, BMPR1A, BRCA1, BRCA2, BRIP1, CDH1, CDKN2A, CHEK2, EPCAM, MEN1, MLH1, MSH2, MSH6, NBN, NF1, PALB2, PMS2, PTEN, RAD51C, RAD51D, SMAD4, STK11, TP53, TSC1, TSC2, and VHL.      We discussed that many of these genes are associated with specific hereditary cancer syndromes and published management guidelines: Hereditary Breast and Ovarian Cancer syndrome (BRCA1, BRCA2), Connelly syndrome (MLH1, MSH2, MSH6, PMS2, EPCAM), Familial Adenomatous Polyposis (APC), Hereditary Diffuse Gastric Cancer (CDH1), Familial Atypical Multiple Mole Melanoma syndrome (CDKN2A), Multiple Endocrine Neoplasia type 1 (MEN1), Juvenile Polyposis syndrome (BMPR1A, SMAD4), Cowden syndrome (PTEN), Li Fraumeni syndrome (TP53), Peutz-Jeghers syndrome (STK11), Tuberous sclerosis complex (TSC1, TSC2), Von Hippel-Lindau disease (VHL), and Neurofibromatosis type 1 (NF1).     The SEBASTIAN, BARD1, BRIP1, CHEK2, NBN, PALB2, RAD51C, and RAD51D genes are associated with increased cancer risk and have published management guidelines for certain cancers.     Consent was obtained over the video. Belle elected to have a saliva collection kit shipped to her  home directly. Once her saliva sample is collected, genetic testing using the Breast and Gyn Cancers Guidelines-Based Panel + Pancreatic Cancer Panel + BARD1 Gene will be sent to [facility]. Turn around time: 2-3 weeks after Maty receives her saliva sample.    Medical Management: For Belle, we reviewed that the information from genetic testing may determine:    additional cancer screening for which Belle may qualify (i.e. mammogram and breast MRI, regular abdominal imaging, more frequent colonoscopies, more frequent dermatologic exams, etc.),    options for risk reducing surgeries Belle could consider (i.e. bilateral mastectomy, surgery to remove her ovaries and/or uterus, etc.),      and targeted chemotherapies if she were to develop certain cancers in the future (i.e. immunotherapy for individuals with Connelly syndrome, PARP inhibitors, etc.).     These recommendations and possible targeted chemotherapies will be discussed in detail once genetic testing is completed.     Plan:  1) Today Belle elected to proceed with genetic testing using the [employer] Breast and Gyn Cancers Guidelines-Based Panel + Pancreatic Cancer Panel + BARD1 Gene from [employer]. A saliva collection kit will be shipped to her home directly.  2) The results should be available 2-3 weeks after Maty receives her saliva sample.  3) I will contact Belle by either video or telephone to discuss the results.    Florence Lofton MS, [facility]  Licensed Genetic Counselor  Office: [phone]  Pager: [phone]      Again, thank you for allowing me to participate in the care of your patient.        Sincerely,        Florence Lofton, RASHEED

## 2021-05-14 ENCOUNTER — OFFICE VISIT (OUTPATIENT)
Dept: OBGYN | Facility: CLINIC | Age: 51
End: 2021-05-14
Payer: COMMERCIAL

## 2021-05-14 VITALS
BODY MASS INDEX: 28.2 KG/M2 | TEMPERATURE: 98.9 F | SYSTOLIC BLOOD PRESSURE: 148 MMHG | WEIGHT: 165.2 LBS | HEART RATE: 78 BPM | DIASTOLIC BLOOD PRESSURE: 86 MMHG | HEIGHT: 64 IN

## 2021-05-14 DIAGNOSIS — R87.810 CERVICAL HIGH RISK HUMAN PAPILLOMAVIRUS (HPV) DNA TEST POSITIVE: ICD-10-CM

## 2021-05-14 DIAGNOSIS — R87.810 CERVICAL HIGH RISK HPV (HUMAN PAPILLOMAVIRUS) TEST POSITIVE: Primary | ICD-10-CM

## 2021-05-14 LAB — HCG UR QL: NEGATIVE

## 2021-05-14 PROCEDURE — 88305 TISSUE EXAM BY PATHOLOGIST: CPT | Performed by: PATHOLOGY

## 2021-05-14 PROCEDURE — 81025 URINE PREGNANCY TEST: CPT | Performed by: OBSTETRICS & GYNECOLOGY

## 2021-05-14 PROCEDURE — 88342 IMHCHEM/IMCYTCHM 1ST ANTB: CPT | Performed by: PATHOLOGY

## 2021-05-14 PROCEDURE — 57454 BX/CURETT OF CERVIX W/SCOPE: CPT | Performed by: OBSTETRICS & GYNECOLOGY

## 2021-05-14 ASSESSMENT — MIFFLIN-ST. JEOR: SCORE: 1344.34

## 2021-05-14 NOTE — NURSING NOTE
"Chief Complaint   Patient presents with     Colposcopy       Initial BP (!) 148/86   Pulse 78   Temp 98.9  F (37.2  C) (Temporal)   Ht 1.626 m (5' 4\")   Wt 74.9 kg (165 lb 3.2 oz)   BMI 28.36 kg/m   Estimated body mass index is 28.36 kg/m  as calculated from the following:    Height as of this encounter: 1.626 m (5' 4\").    Weight as of this encounter: 74.9 kg (165 lb 3.2 oz).  BP completed using cuff size: regular    Questioned patient about current smoking habits.  Pt. has never smoked.          The following HM Due: NONE      The following patient reported/Care Every where data was sent to:  P ABSTRACT QUALITY INITIATIVES [[id number]]        N/a      Angi Long MA                  "

## 2021-05-14 NOTE — PATIENT INSTRUCTIONS
Patient Education     Understanding Cervical Biopsy     A cervical biopsy removes a small piece of tissue from your cervix. This tissue is sent to a lab to check for any problems or changes in the cells such as precancerous conditions or cancer. The cervix is the narrowest part of your womb (uterus). It links the uterus to the vagina.  Why cervical biopsy is done  This procedure may be done if you have an abnormal Pap test or pelvic exam. It helps find cancer and cell changes that could become cancer over time.  How cervical biopsy is done  A cervical biopsy is often done on an outpatient basis. This means you can go home afterward. This is a general idea of what you can expect during the procedure:    Your healthcare provider puts a speculum into your vagina. It holds your vagina open so your entire cervix can be seen.    A colposcope might be used to look at your cervix. This tool magnifies the surface of your cervix so the provider can see even small areas of changed cells. It doesn't touch your body.    The provider may use a solution that's a lot like dilute vinegar to wipe on your cervix if colposcopy is to be done. This solution makes the changed cells turn white and helps the provider find any areas of concern.    The provider may use a small needle to inject medicine into your cervix so you don t feel pain.    There are many ways to remove cells/tissue from the cervix: A scissors-like tool called a forceps can be put on the abnormal area of your cervix. Your provider shuts the jaws of the forceps. This cuts off a piece of tissue. Sometimes the provider uses a small spoon-shaped tool (curette) to scrape out cells. Another option is LEEP or a loop electrosurgical procedure. To do this, the provider uses a small, thin, heated wire loop to cut out a piece of tissue.    He or she may put a brown paste on the area to stop any bleeding. You may need a stitch if the bleeding doesn t stop.    The tissue is sent to  a lab to check for cancer or other cell changes.  Another type of cervical biopsy is called conization or a cone biopsy. This is done much the same way as the others, but sometimes medicines are used so you sleep and don't feel pain (general anesthesia). In this case, it's done in the hospital. A cone biopsy removes a bigger, cone-shaped piece of tissue from the cervix. It can sometimes also be used as treatment because it lets the provider take out all of the changed cells.  Risks of cervical biopsy  These include:    Bleeding    Cramps    Infection  Your healthcare team will talk with you about the type of biopsy that's best for you and what you can expect it to be like.  Daphnie last reviewed this educational content on 11/1/2019 2000-2021 [employer]. All rights reserved. This information is not intended as a substitute for professional medical care. Always follow your healthcare professional's instructions.

## 2021-05-14 NOTE — PROGRESS NOTES
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    Pap Hx:  21- NIL HPV positive for HR type 18    No LMP recorded. (Menstrual status: IUD). , Mirena IUD in place  UPT today is negative    Patient does not smoke    Type of contraception: IUD  Age at first sexual intercourse: 19  Number of sexual partners (lifetime): less than 6   Past GYN history:  No STD history    OBGYN Hx:   OB History    Para Term  AB Living   4 2 2 0 2 2   SAB TAB Ectopic Multiple Live Births   2 0 0 0 2      # Outcome Date GA Lbr Warren/2nd Weight Sex Delivery Anes PTL Lv   4 Term 05 40w0d  3.515 kg (7 lb 12 oz) M CS   DOMINIC      Name: bridgett Berry Term 01 40w0d    CS   DOMINIC   2 SAB      SAB   DEC   1 SAB      SAB   DEC     Menses:none, Mirena IUD in place  Not currently sexually active  STD Hx: none  PMH: hypothyroidism  PSH:  x2, tonsillectomy   Meds: levothyroxine  Allergies: NKDA  SH:Denies any tobacco, drug, or alcohol use      O:   Patient Vitals for the past 24 hrs:   BP Temp Temp src Pulse Height Weight   21 1348 (!) 148/86 98.9  F (37.2  C) Temporal 78 1.626 m (5' 4\") 74.9 kg (165 lb 3.2 oz)   ]    PROCEDURE:  Before the procedure, it was ensured that the patient was educated regarding the nature of her findings to date, the implications, and what was to be done. She has been made aware of the role of HPV, the natural history of infection, ways to minimize her future risk, the effect of HPV on the cervix, and treatment options available should they be indicated.  The details of the colposcopic procedure were reviewed. Indications, alternatives, benefits, and risks including bleeding, infection, pain, and injury to surrounding organs were reviewed.  Questions and concerns were addressed.  Consent was signed.     Medium roxann speculum placed in vagina and excellent " visualization of cervix achieved, cervix swabbed x 3 with acetic acid solution.  Cervix noted to have acetowhite patch at 12 oclock and acetowhite changes at 6-7 oclock with mosaicism at 7 oclock.  Lugol's applied showing areas of decreased uptake at 7 and 12 oclock.  Cervical biopsy taken at 12 and 7 oclock.  Endocervical currettage collected.  Cervix made hemostatic with silver nitrate sticks.  Speculum removed. Patient tolerated procedure well.    FINDINGS:  Cervix: Cervix noted to have acetowhite patch at 12 oclock and acetowhite changes at 6-7 oclock with mosaicism at 7 oclock.  Lugol's applied showing areas of decreased uptake at 7 and 12 oclock.     Pap repeated?:  No  SCJ seen?:  yes    ECC done?:  Yes   Lugol's solution used?:  Yes   Satisfactory examination?:  yes      ASSESSMENT: MERCEDEZ 1.    PLAN: specimens labelled and sent to Pathology, will base further treatment on Pathology findings and post biopsy instructions given to patient    Lien Wade MD

## 2021-05-18 NOTE — PATIENT INSTRUCTIONS
Assessing Cancer Risk  Only about 5-10% of cancers are thought to be due to an inherited cancer susceptibility gene.    These families often have:    Several people with the same or related types of cancer    Cancers diagnosed at a young age (before age 50)    Individuals with more than one primary cancer    Multiple generations of the family affected with cancer    Some people may be candidates for genetic testing of more than one gene.  For these families, genetic testing using a cancer panel may be offered.  These panels will test different genes known to increase the risk for breast, ovarian, uterine, and/or other cancers. All of the genes discussed below have published clinical management guidelines for individuals who are found to carry a mutation. The purpose of this handout is to serve as a brief summary of the genes analyzed by the panels used to inquire about hereditary breast and gynecologic cancer:  SEBASTIAN, BRCA1, BRCA2, BRIP1, CDH1, CHEK2, MLH1, MSH2, MSH6, PMS2, EPCAM, PTEN, PALB2, RAD51C, RAD51D, and TP53.  ______________________________________________________________________________  Hereditary Breast and Ovarian Cancer Syndrome   (BRCA1 and BRCA2)  A single mutation in one of the copies of BRCA1 or BRCA2 increases the risk for breast and ovarian cancer, among others.  The risk for pancreatic cancer and melanoma may also be slightly increased in some families.  The chart below shows the chance that someone with a BRCA mutation would develop cancer in his or her lifetime1,2,3,4.        A person s ethnic background is also important to consider, as individuals of Ashkenazi [place] ancestry have a higher chance of having a BRCA gene mutation.  There are three BRCA mutations that occur more frequently in this population.    Connelly Syndrome   (MLH1, MSH2, MSH6, PMS2, and EPCAM)  Currently five genes are known to cause Connelly Syndrome: MLH1, MSH2, MSH6, PMS2, and EPCAM.  A single mutation in one of the  Connelly Syndrome genes increases the risk for colon, endometrial, ovarian, and stomach cancers.  Other cancers that occur less commonly in Connelly Syndrome include urinary tract, skin, and brain cancers.  The chart below shows the chance that a person with Connelly syndrome would develop cancer in his or her lifetime5.      *Cancer risk varies depending on Connelly syndrome gene found    Cowden Syndrome   (PTEN)  Cowden syndrome is a hereditary condition that increases the risk for breast, thyroid, endometrial, colon, and kidney cancer.  Cowden syndrome is caused by a mutation in the PTEN gene.  A single mutation in one of the copies of PTEN causes Cowden syndrome and increases cancer risk.  The chart below shows the chance that someone with a PTEN mutation would develop cancer in their lifetime6,7.  Other benign features seen in some individuals with Cowden syndrome include benign skin lesions (facial papules, keratoses, lipomas), learning disability, autism, thyroid nodules, colon polyps, and larger head size.      *One recent study found breast cancer risk to be increased to 85%    Li-Fraumeni Syndrome   (TP53)  Li-Fraumeni Syndrome (LFS) is a cancer predisposition syndrome caused by a mutation in the TP53 gene. A single mutation in one of the copies of TP53 increases the risk for multiple cancers. Individuals with LFS are at an increased risk for developing cancer at a young age. The lifetime risk for development of a LFS-associated cancer is 50% by age 30 and 90% by age 60.   Core Cancers: Sarcomas, Breast, Brain, Lung, Leukemias/Lymphomas, Adrenocortical carcinomas  Other Cancers: Gastrointestinal, Thyroid, Skin, Genitourinary    Hereditary Diffuse Gastric Cancer   (CDH1)  Currently, one gene is known to cause hereditary diffuse gastric cancer (HDGC): CDH1.  Individuals with HDGC are at increased risk for diffuse gastric cancer and lobular breast cancer. Of people diagnosed with HDGC, 30-50% have a mutation in the CDH1  gene.  This suggests there are likely other genes that may cause HDGC that have not been identified yet.      Lifetime Cancer Risks    General Population HDGC    Diffuse Gastric  <1% ~80%   Breast 12% 39-52%         Additional Genes  SEBASTIAN  SEBASTIAN is a moderate-risk breast cancer gene. Women who have a mutation in SEBASTIAN can have between a 2-4 fold increased risk for breast cancer compared to the general population8. SEBASTIAN mutations have also been associated with increased risk for pancreatic cancer, however an estimate of this cancer risk is not well understood9. Individuals who inherit two SEBASTIAN mutations have a condition called ataxia-telangiectasia (AT).  This rare autosomal recessive condition affects the nervous system and immune system, and is associated with progressive cerebellar ataxia beginning in childhood.  Individuals with ataxia-telangiectasia often have a weakened immune system and have an increased risk for childhood cancers.    PALB2  Mutations in PALB2 have been shown to increase the risk of breast cancer up to 33-58% in some families; where individuals fall within this risk range is dependent upon family [id number]. PALB2 mutations have also been associated with increased risk for pancreatic cancer, although this risk has not been quantified yet.  Individuals who inherit two PALB2 mutations--one from their mother and one from their father--have a condition called Fanconi Anemia.  This rare autosomal recessive condition is associated with short stature, developmental delay, bone marrow failure, and increased risk for childhood cancers.    CHEK2   CHEK2 is a moderate-risk breast cancer gene.  Women who have a mutation in CHEK2 have around a 2-fold increased risk for breast cancer compared to the general population, and this risk may be higher depending upon family history.11,12,13 Mutations in CHEK2 have also been shown to increase the risk of a number of other cancers, including colon and prostate, however  these cancer risks are currently not well understood.    BRIP1, RAD51C and RAD51D  Mutations in BRIP1, RAD51C, and RAD51D have been shown to increase the risk of ovarian cancer and possibly female breast cancer as well14,15 .       Lifetime Cancer Risk    General Population BRIP1 RAD51C RAD51D   Ovarian 1-2% ~5-8% ~5-9% ~7-15%           Inheritance  All of the cancer syndromes reviewed above are inherited in an autosomal dominant pattern.  This means that if a parent has a mutation, each of his or her children will have a 50% chance of inheriting that same mutation.  Therefore, each child--male or female--would have a 50% chance of being at increased risk for developing cancer.      Image obtained from Genetics Home Reference, 2013     Mutations in some genes can occur de glory, which means that a person s mutation occurred for the first time in them and was not inherited from a parent.  Now that they have the mutation, however, it can be passed on to future generations.    Genetic Testing  Genetic testing involves a blood test and will look at the genetic information in the SEBASTIAN, BRCA1, BRCA2, BRIP1, CDH1, CHEK2, MLH1, MSH2, MSH6, PMS2, EPCAM, PTEN, PALB2, RAD51C, RAD51D, and TP53 genes for any harmful mutations that are associated with increased cancer risk.  If possible, it is recommended that the person(s) who has had cancer be tested before other family members.  That person will give us the most useful information about whether or not a specific gene is associated with the cancer in the family.    Results  There are three possible results of genetic testing:    Positive--a harmful mutation was identified in one or more of the genes    Negative--no mutation was identified in any of the genes on this panel    Variant of unknown significance--a variation in one of the genes was identified, but it is unclear how this impacts cancer risk in the family    Advantages and Disadvantages   There are advantages and  disadvantages to genetic testing.    Advantages    May clarify your cancer risk    Can help you make medical decisions    May explain the cancers in your family    May give useful information to your family members (if you share your results)    Disadvantages    Possible negative emotional impact of learning about inherited cancer risk    Uncertainty in interpreting a negative test result in some situations    Possible genetic discrimination concerns (see below)    Genetic Information Nondiscrimination Act (BLANCO)  BLANCO is a federal law that protects individuals from health insurance or employment discrimination based on a genetic test result alone.  Although rare, there are currently no legal discrimination protections in terms of life insurance, long term care, or disability insurances.  Visit the [employer] [place] website to learn more.    Reducing Cancer Risk  All of the genes described above have nationally recognized cancer screening guidelines that would be recommended for individuals who test positive.  In addition to increased cancer screening, surgeries may be offered or recommended to reduce cancer risk.  Recommendations are based upon an individual s genetic test result as well as their personal and family history of cancer.    Questions to Think About Regarding Genetic Testing:    What effect will the test result have on me and my relationship with my family members if I have an inherited gene mutation?  If I don t have a gene mutation?    Should I share my test results, and how will my family react to this news, which may also affect them?    Are my children ready to learn new information that may one day affect their own health?    Hereditary Cancer Resources    FORCE: Facing Our Risk of Cancer Empowered facingourrisk.org   Bright Pink bebrightpink.org   Li-Fraumeni Syndrome Association lfsassociation.org   PTEN World PTENworld.com   No stomach for cancer, Inc.  nostomachforcancer.org   Stomach cancer relief network Scrnet.org   Collaborative Group of the Americas on Inherited Colorectal Cancer (CGA) cgaicc.com    Cancer Care cancercare.org   American Cancer Society (ACS) cancer.org   National Cancer [place] (NCI) cancer.gov     Please call us if you have any questions or concerns.   Cancer Risk Management Program [phone] ([phone])  ? Cristo Luong, MS, [facility] [phone]  ? Danica Bashir, MS, [facility]  [phone]  ? Debra Guzman, MS, [facility]  [phone]  ? Florence Lofton, MS, [facility] [phone]  ? Radha Huong, MS, [facility] [phone]  ? Denia Tyler, MS, [facility]  [phone]    References  1. Tariq A, Miguel PDP, Hosea S, Trina CHIN, Sara JE, Aditya JL, Brain N, Robert H, Beltran O, Toan A, Be B, Aisha P, Mansea S, Sheridan DM, Hand N, Ronnie E, Robert H, Eldon E, Ramo J, Gronisiah J, Derrell B, Deep H, Thorlacius S, Eerola H, Leona H, Merlyn K, Sunday OP. Average risks of breast and ovarian cancer associated with BRCA1 or BRCA2 mutations detected in case series unselected for family history: a combined analysis of 222 studies. Am J Hum Zelda. 2003;72:1117-30.  2. Gokul CARR, Imani M, Mallorie G.  BRCA1 and BRCA2 Hereditary Breast and Ovarian Cancer. Gene Reviews online. 2013.  3. Clifton YC, Shruthi S, Bong G, Fisher S. Breast cancer risk among male BRCA1 and BRCA2 mutation carriers. J Natl Cancer Inst. 2007;99:1811-4.  4. Kyle PIERRE, Zahira I, Héctor J, Mary E, Miley ER, Francisco F. Risk of breast cancer in male BRCA2 carriers. J Med Zelda. 2010;47:710-1.  5. National Comprehensive Cancer Network. Clinical practice guidelines in oncology, colorectal cancer screening. Available online (registration required). 2015.  6. Mata ALEJANDRO, Kraig J, Vicente J, Edgard LA, Mayito REYEZ, Gabby C. Lifetime cancer risks in individuals with germline PTEN mutations. Clin Cancer Res. 2012;18:400-7.  7. Joselito RYAN. Cowden Syndrome: A Critical Review of the  Clinical Literature. J Zelda . 2009:18:13-27.  8. Shilpa A, Adrian D, Jemma S, Mayra P, Phyllis T, Eduardo M, Mike B, Marlena H, Macie R, Odilia K, Yvette L, Kyle DG, Sheridan D, Layo DF, Leo MR, The Breast Cancer Susceptibility Collaboration () & Jaime CARR. SEBASTIAN mutations that cause ataxia-telangiectasia are breast cancer susceptibility alleles. Nature Genetics. 2006;38:873-875  9. Pascual N , Brittany Y, Ruba J, Tootie L, Paco GM , Ruth ML, Gallinger S, Nicholas AG, Syngal S, Erma ML, Reginald J , Brain R, Olya SZ, Bobbi JR, David VE, Nataly M, Voprincess B, Gonzalo N, Noe RH, Froylan KW, and Deni AP. SEBASTIAN mutations in patients with hereditary pancreatic cancer. Cancer Discover. 2012;2:41-46  10. Tariq FLORES, et al. Breast-Cancer Risk in Families with Mutations in PALB2. NEJM. 2014; 371(6):497-506.  11. CHEK2 Breast Cancer Case-Control Consortium. CHEK2*1100delC and susceptibility to breast cancer: A collaborative analysis involving 10,860 breast cancer cases and 9,065 controls from 10 studies. Am J Hum Zelda, 74 (2004), pp. 0964-9402  12. Kimberly T, Freeman S, Caryn K, et al. Spectrum of Mutations in BRCA1, BRCA2, CHEK2, and TP53 in Families at High Risk of Breast Cancer. DAPHNEY. 2006;295(12):1887-6931.   13. Timo C, Alex D, Mevlin A, et al. Risk of breast cancer in women with a CHEK2 mutation with and without a family history of breast cancer. J Clin Oncol. 2011;29:8615-0038.  14. Rajat H, Bryan E, Nilda SJ, et al. Contribution of germline mutations in the RAD51B, RAD51C, and RAD51D genes to ovarian cancer in the population. J Clin Oncol. 2015;33(26):2035-8367. Doi:10.1200/JCO.2015.61.2408.  15. Ana Rosa T, Solis GUZMAN, Nimco P, et al. Mutations in BRIP1 confer high risk of ovarian cancer. Lou Zelda. 2011;43(11):7905-4096. doi:10.1038/ng.955.

## 2021-05-19 LAB — COPATH REPORT: NORMAL

## 2021-05-20 DIAGNOSIS — Z11.59 ENCOUNTER FOR SCREENING FOR OTHER VIRAL DISEASES: ICD-10-CM

## 2021-05-25 ENCOUNTER — TELEPHONE (OUTPATIENT)
Dept: GASTROENTEROLOGY | Facility: CLINIC | Age: 51
End: 2021-05-25

## 2021-05-25 DIAGNOSIS — Z80.0 FAMILY HISTORY OF PANCREATIC CANCER: ICD-10-CM

## 2021-05-25 DIAGNOSIS — Z80.3 FAMILY HISTORY OF MALIGNANT NEOPLASM OF BREAST: ICD-10-CM

## 2021-05-25 NOTE — TELEPHONE ENCOUNTER
Attempted to contact patient regarding upcoming colonoscopy procedure on 6/7/21 for pre assessment questions. No answer.     Left message to return call to [phone] #2    Covid test scheduled 6/3/21    Lorenza Yu RN

## 2021-05-25 NOTE — TELEPHONE ENCOUNTER
Patient returned call and then call was dropped during transfer.     Attempted to contact patient regarding upcoming colonoscopy procedure on 6/7/21 for pre assessment questions. No answer.     Left message to return call to [phone] #2    Lorenza Yu RN

## 2021-05-26 NOTE — TELEPHONE ENCOUNTER
Returning pt's call.    Writer reviewed pre-assessment questions with patient prior to upcoming colonoscopy on 6.7.2021.  COVID test scheduled for 6.3.2021.    Reviewed miralax and magnesium citrate prep instructions with patient.  Noting no nuts, seeds, or popcorn 7 days prior to procedure.     Designated  policy reviewed with patient.     Patient verbalized understanding.  No further questions or concerns.    Danii Nieves RN

## 2021-05-28 ENCOUNTER — TELEPHONE (OUTPATIENT)
Dept: ONCOLOGY | Facility: CLINIC | Age: 51
End: 2021-05-28

## 2021-05-28 NOTE — TELEPHONE ENCOUNTER
5/28/21 - [facility] to call [phone] opt5, opt2 to schedule virtual return visit with Florence Lotfon AFTER 6/17/21 for genetic testing results. -John

## 2021-06-03 DIAGNOSIS — Z11.59 ENCOUNTER FOR SCREENING FOR OTHER VIRAL DISEASES: ICD-10-CM

## 2021-06-03 LAB
LAB SCANNED RESULT: NORMAL
MISCELLANEOUS TEST: NORMAL
SARS-COV-2 RNA RESP QL NAA+PROBE: NORMAL
SPECIMEN SOURCE: NORMAL

## 2021-06-03 PROCEDURE — U0005 INFEC AGEN DETEC AMPLI PROBE: HCPCS | Performed by: INTERNAL MEDICINE

## 2021-06-03 PROCEDURE — U0003 INFECTIOUS AGENT DETECTION BY NUCLEIC ACID (DNA OR RNA); SEVERE ACUTE RESPIRATORY SYNDROME CORONAVIRUS 2 (SARS-COV-2) (CORONAVIRUS DISEASE [COVID-19]), AMPLIFIED PROBE TECHNIQUE, MAKING USE OF HIGH THROUGHPUT TECHNOLOGIES AS DESCRIBED BY CMS-2020-01-R: HCPCS | Performed by: INTERNAL MEDICINE

## 2021-06-04 LAB
LABORATORY COMMENT REPORT: NORMAL
SARS-COV-2 RNA RESP QL NAA+PROBE: NEGATIVE
SPECIMEN SOURCE: NORMAL

## 2021-06-07 ENCOUNTER — HOSPITAL ENCOUNTER (OUTPATIENT)
Facility: AMBULATORY SURGERY CENTER | Age: 51
Discharge: HOME | End: 2021-06-07
Attending: INTERNAL MEDICINE | Admitting: INTERNAL MEDICINE
Payer: COMMERCIAL

## 2021-06-07 VITALS
TEMPERATURE: 97 F | HEART RATE: 69 BPM | DIASTOLIC BLOOD PRESSURE: 81 MMHG | SYSTOLIC BLOOD PRESSURE: 123 MMHG | OXYGEN SATURATION: 100 % | RESPIRATION RATE: 16 BRPM

## 2021-06-07 LAB — COLONOSCOPY: NORMAL

## 2021-06-07 PROCEDURE — 45380 COLONOSCOPY AND BIOPSY: CPT | Mod: 33

## 2021-06-07 PROCEDURE — 88305 TISSUE EXAM BY PATHOLOGIST: CPT | Performed by: PATHOLOGY

## 2021-06-07 RX ORDER — ONDANSETRON 2 MG/ML
4 INJECTION INTRAMUSCULAR; INTRAVENOUS
Status: DISCONTINUED | OUTPATIENT
Start: 2021-06-07 | End: 2021-06-08 | Stop reason: HOSPADM

## 2021-06-07 RX ORDER — ONDANSETRON 2 MG/ML
4 INJECTION INTRAMUSCULAR; INTRAVENOUS EVERY 6 HOURS PRN
Status: CANCELLED | OUTPATIENT
Start: 2021-06-07

## 2021-06-07 RX ORDER — LIDOCAINE 40 MG/G
CREAM TOPICAL
Status: DISCONTINUED | OUTPATIENT
Start: 2021-06-07 | End: 2021-06-08 | Stop reason: HOSPADM

## 2021-06-07 RX ORDER — NALOXONE HYDROCHLORIDE 0.4 MG/ML
0.4 INJECTION, SOLUTION INTRAMUSCULAR; INTRAVENOUS; SUBCUTANEOUS
Status: CANCELLED | OUTPATIENT
Start: 2021-06-07

## 2021-06-07 RX ORDER — NALOXONE HYDROCHLORIDE 0.4 MG/ML
0.2 INJECTION, SOLUTION INTRAMUSCULAR; INTRAVENOUS; SUBCUTANEOUS
Status: CANCELLED | OUTPATIENT
Start: 2021-06-07

## 2021-06-07 RX ORDER — FENTANYL CITRATE 50 UG/ML
INJECTION, SOLUTION INTRAMUSCULAR; INTRAVENOUS PRN
Status: DISCONTINUED | OUTPATIENT
Start: 2021-06-07 | End: 2021-06-07 | Stop reason: HOSPADM

## 2021-06-07 RX ORDER — ONDANSETRON 4 MG/1
4 TABLET, ORALLY DISINTEGRATING ORAL EVERY 6 HOURS PRN
Status: CANCELLED | OUTPATIENT
Start: 2021-06-07

## 2021-06-07 RX ORDER — PROCHLORPERAZINE MALEATE 10 MG
10 TABLET ORAL EVERY 6 HOURS PRN
Status: CANCELLED | OUTPATIENT
Start: 2021-06-07

## 2021-06-07 RX ORDER — SIMETHICONE
LIQUID (ML) MISCELLANEOUS PRN
Status: DISCONTINUED | OUTPATIENT
Start: 2021-06-07 | End: 2021-06-07 | Stop reason: HOSPADM

## 2021-06-07 RX ORDER — FLUMAZENIL 0.1 MG/ML
0.2 INJECTION, SOLUTION INTRAVENOUS
Status: CANCELLED | OUTPATIENT
Start: 2021-06-07 | End: 2021-06-08

## 2021-06-07 NOTE — OR NURSING
NO HCG urine day of colonoscopy 06/07/2021 Dr. Huff informed and  ok to proceed, recent HCG at clinic within the past month and patient denies sexual activity .

## 2021-06-08 LAB — COPATH REPORT: NORMAL

## 2021-06-09 ENCOUNTER — PATIENT OUTREACH (OUTPATIENT)
Dept: OBGYN | Facility: CLINIC | Age: 51
End: 2021-06-09

## 2021-06-23 ENCOUNTER — VIRTUAL VISIT (OUTPATIENT)
Dept: ONCOLOGY | Facility: CLINIC | Age: 51
End: 2021-06-23
Attending: GENETIC COUNSELOR, MS
Payer: COMMERCIAL

## 2021-06-23 DIAGNOSIS — Z80.3 FAMILY HISTORY OF MALIGNANT NEOPLASM OF BREAST: Primary | ICD-10-CM

## 2021-06-23 DIAGNOSIS — Z80.0 FAMILY HISTORY OF PANCREATIC CANCER: ICD-10-CM

## 2021-06-23 PROCEDURE — 96040 HC GENETIC COUNSELING, EACH 30 MINUTES: CPT | Mod: GT | Performed by: GENETIC COUNSELOR, MS

## 2021-06-23 NOTE — LETTER
Cancer Risk Management  Program Locations    [facility]  [facility]  [facility]  [facility]  Mailing Address  Cancer Risk Management Program  [facility]  [address]  [place] [address]    New patient appointments  [phone]  July 5, 2021    Belle Tejada  [address] [facility] [address]      Dear Belle,    It was a pleasure speaking with you recently regarding your genetic testing results. Here is a copy of the progress note summarizing our conversation. If you have any additional questions, please feel free to call.    6/23/2021    Belle is a 50 year old who is being evaluated via a billable video visit.      How would you like to obtain your AVS? MyChart  If the video visit is dropped, the invitation should be resent by: Text to cell phone: [phone]  Will anyone else be joining your video visit? No    Video-Visit Details  Type of service:  Video Visit  Video Start Time: 7:58am  Video End Time:8:18am  Originating Location (pt. Location): Home  Distant Location (provider location):  [facility]   Platform used for Video Visit: Joseph    Referring Provider: STARR Levy CNP    Presenting Information:  I spoke to Belle by video today to discuss her genetic testing results. We met on 5/11/2021 and her saliva sample was collected on 5/25/2021. The [facility] Cancers Guidelines-Based Panel + Pancreatic Cancer Panel + BARD1 Gene was ordered from [facility]. This testing was done because of Belle's family history of breast and pancreatic cancer.    Genetic Testing Result: NEGATIVE  Belle is negative for mutations in the APC, SEBASTIAN, BARD1, BMPR1A, BRCA1, BRCA2, BRIP1, CDH1, CDKN2A, CHEK2, EPCAM, MEN1, MLH1, MSH2, MSH6, NBN, NF1, PALB2, PMS2, PTEN, RAD51C, RAD51D,  "SMAD4, STK11, TP53, TSC1, TSC2, and VHL genes. No mutations were found in any of the 28 genes analyzed. This test involved sequencing and deletion/duplication analysis of all genes with the exception of EPCAM (deletions/duplications only).    Testing did not detect an identifiable mutation associated with Hereditary Breast and Ovarian Cancer syndrome (BRCA1, BRCA2), Connelly syndrome (MLH1, MSH2, MSH6, PMS2, EPCAM), Familial Adenomatous Polyposis (APC), Hereditary Diffuse Gastric Cancer (CDH1), Familial Atypical Multiple Mole Melanoma syndrome (CDKN2A), Multiple Endocrine Neoplasia type 1 (MEN1), Juvenile Polyposis syndrome (BMPR1A, SMAD4), Cowden syndrome (PTEN), Li Fraumeni syndrome (TP53), Peutz-Jeghers syndrome (STK11), Tuberous sclerosis complex (TSC1, TSC2), Von Hippel-Lindau disease (VHL), and Neurofibromatosis type 1 (NF1).    A copy of the test report can be found in the Laboratory tab, dated 5/25/2021, and named \"[facility]\". The report is scanned in as a linked document.    Interpretation:  We discussed several different interpretations of this negative test result.    1. One explanation may be that there is a different gene or combination of genes and environment that are associated with the cancers in this family that are not identifiable using this test. As such, Belle is encouraged to contact me regularly to review any new genetic testing options that may be appropriate for her.  2. Another explanation may be that her mother did have a mutation in one of these 28 genes and Belle did not inherit it.  3. There is also a small possibility that there is a mutation in one of these genes, and the testing laboratory could not find it with their current testing methods.       Screening:  Based on this negative test result, it is important for Belle and her relatives to refer back to the family history for appropriate cancer screening.      Based on her personal and family history, Belle has a " 11-23.7% lifetime risk of developing breast cancer based on the [place] and ALMA 8 models, respectively. As such, Belle meets current National Comprehensive Cancer Network (NCCN) guidelines for high risk breast screening. This includes annual breast MRI in addition to annual mammogram beginning at age 40. In addition, Belle should be receiving clinical breast exams by her physician. We discussed that Belle could participate in our Cancer Risk Management Program in which our Physician Assistant provides an individual screening plan and assists with medical management. A referral was made to see Janae Frederick PA-C, for this service.    Belle s close maternal female relatives also remain at increased risk for breast cancer given their family history. Breast screening options should be discussed with an individual's primary care provider and a genetic counselor, to determine at what age to begin screening, what screening is appropriate, and if additional screening (such as breast MRI) is necessary based on personal/family history factors.    Other population cancer screening options, such as those recommended by the American Cancer Society and NCCN, are also appropriate for Belle and her family. These screening recommendations may change if there are changes to Belle's personal and/or family history of cancer. Final screening recommendations should be made by each individual's managing physician.      Inheritance:  We reviewed the autosomal dominant inheritance of mutations in these 28 genes. We discussed that Belle did not pass on an identifiable mutation in these genes to her children based on this test result. Mutations in these genes do not skip generations.      Additional Testing Considerations:  We reviewed that it is still possible Belle does carry a currently identifiable gene or combination of genes and environment that increase her risk for certain cancers. We again reviewed the option of larger gene  panels covering more moderate risk genes associated with breast, pancreatic, and related cancers. Belle is encouraged to contact me if she wishes to readdress these larger gene panel options.     Also, although Belle's genetic testing result was negative, other relatives may still carry a gene mutation associated with hereditary cancer. Genetic counseling is recommended for her brothers and extended maternal relatives to discuss their genetic testing options. If any of these relatives do pursue genetic testing, Belle is encouraged to contact me so that we may review the impact of their test results on her.    We also reviewed Belle's family history of ichthyosis. I explained that I spoke with my colleagues and it would be reasonable for Belle's son to consider meeting with a dermatologist specializing in genetic skin conditions to confirm his diagnosis. Based on that evaluation, genetic testing may then be recommended. If Belle's son is not interested in such an evaluation at this time, he would be encouraged to readdress this history with his medical providers if he is considering family planning. Belle explained that he will speak to her son regarding these options and will contact me if he is interested in an evaluation at this time.    Summary:  We do not have an explanation for Belle's family history of cancer. While no genetic changes were identified, Belle may still be at risk for certain cancers due to family history, environmental factors, or other genetic causes not identified by this test.  Because of that, it is important that she continue with cancer screening based on her personal and family history as discussed above.    Genetic testing is rapidly advancing, and new cancer susceptibility genes will most likely be identified in the future. Therefore, I encouraged Belle to contact me regularly or if there are changes in her personal or family history. This may change how we assess her cancer  risk, screening, and the testing we would offer.    Plan:  1. Belle will be mailed a copy of her test results.  2. She plans to follow-up with her medical providers, as needed.  3. A referral was placed for Belle to meet with Janae Frederick PA-C to discuss increased breast screening.  4. She should contact me regularly or sooner if her family history changes.    If Belle has any further questions, I encouraged her to contact me at [phone].    Florence Lofton MS, [facility] [place]  Licensed, Certified Genetic Counselor  Office: [phone]  Pager: [phone]

## 2021-06-23 NOTE — Clinical Note
"Please print and send a copy of this letter and the patient's genetic testing report to the patient.    Please enclose test report: \"Send outs misc test Order: [id number]\"  "

## 2021-06-23 NOTE — PROGRESS NOTES
6/23/2021    Belle is a 50 year old who is being evaluated via a billable video visit.      How would you like to obtain your AVS? [employer]  If the video visit is dropped, the invitation should be resent by: Text to cell phone: [phone]  Will anyone else be joining your video visit? No    Video-Visit Details  Type of service:  Video Visit  Video Start Time: 7:58am  Video End Time:8:18am  Originating Location (pt. Location): Home  Distant Location (provider location):  [facility]   Platform used for Video Visit: [employer]    Referring Provider: STARR Levy CNP    Presenting Information:  I spoke to Belle by video today to discuss her genetic testing results. We met on 5/11/2021 and her saliva sample was collected on 5/25/2021. The [facility] Cancers Guidelines-Based Panel + Pancreatic Cancer Panel + BARD1 Gene was ordered from [facility]. This testing was done because of Belle's family history of breast and pancreatic cancer.    Genetic Testing Result: NEGATIVE  Belle is negative for mutations in the APC, SEBASTIAN, BARD1, BMPR1A, BRCA1, BRCA2, BRIP1, CDH1, CDKN2A, CHEK2, EPCAM, MEN1, MLH1, MSH2, MSH6, NBN, NF1, PALB2, PMS2, PTEN, RAD51C, RAD51D, SMAD4, STK11, TP53, TSC1, TSC2, and VHL genes. No mutations were found in any of the 28 genes analyzed. This test involved sequencing and deletion/duplication analysis of all genes with the exception of EPCAM (deletions/duplications only).    Testing did not detect an identifiable mutation associated with Hereditary Breast and Ovarian Cancer syndrome (BRCA1, BRCA2), Connelly syndrome (MLH1, MSH2, MSH6, PMS2, EPCAM), Familial Adenomatous Polyposis (APC), Hereditary Diffuse Gastric Cancer (CDH1), Familial Atypical Multiple Mole Melanoma syndrome (CDKN2A), Multiple Endocrine Neoplasia type 1 (MEN1), Juvenile Polyposis syndrome (BMPR1A, SMAD4), Cowden syndrome (PTEN), Li Fraumeni syndrome (TP53), Peutz-Jeghers syndrome  "(STK11), Tuberous sclerosis complex (TSC1, TSC2), Von Hippel-Lindau disease (VHL), and Neurofibromatosis type 1 (NF1).    A copy of the test report can be found in the Laboratory tab, dated 5/25/2021, and named \"[facility] [place]\". The report is scanned in as a linked document.    Interpretation:  We discussed several different interpretations of this negative test result.    1. One explanation may be that there is a different gene or combination of genes and environment that are associated with the cancers in this family that are not identifiable using this test. As such, Belle is encouraged to contact me regularly to review any new genetic testing options that may be appropriate for her.  2. Another explanation may be that her mother did have a mutation in one of these 28 genes and Belle did not inherit it.  3. There is also a small possibility that there is a mutation in one of these genes, and the testing laboratory could not find it with their current testing methods.       Screening:  Based on this negative test result, it is important for Belle and her relatives to refer back to the family history for appropriate cancer screening.      Based on her personal and family history, Belle has a 11-23.7% lifetime risk of developing breast cancer based on the Arden and ALMA 8 models, respectively. As such, Belle meets current National Comprehensive Cancer Network (NCCN) guidelines for high risk breast screening. This includes annual breast MRI in addition to annual mammogram beginning at age 40. In addition, Belle should be receiving clinical breast exams by her physician. We discussed that Belle could participate in our Cancer Risk Management Program in which our Physician Assistant provides an individual screening plan and assists with medical management. A referral was made to see Janae Frederick PA-C, for this service.    Belle s close maternal female relatives also remain at increased risk for breast " cancer given their family history. Breast screening options should be discussed with an individual's primary care provider and a genetic counselor, to determine at what age to begin screening, what screening is appropriate, and if additional screening (such as breast MRI) is necessary based on personal/family history factors.    Other population cancer screening options, such as those recommended by the American Cancer Society and NCCN, are also appropriate for Belle and her family. These screening recommendations may change if there are changes to Belle's personal and/or family history of cancer. Final screening recommendations should be made by each individual's managing physician.      Inheritance:  We reviewed the autosomal dominant inheritance of mutations in these 28 genes. We discussed that Belle did not pass on an identifiable mutation in these genes to her children based on this test result. Mutations in these genes do not skip generations.      Additional Testing Considerations:  We reviewed that it is still possible Belle does carry a currently identifiable gene or combination of genes and environment that increase her risk for certain cancers. We again reviewed the option of larger gene panels covering more moderate risk genes associated with breast, pancreatic, and related cancers. Belle is encouraged to contact me if she wishes to readdress these larger gene panel options.     Also, although Belle's genetic testing result was negative, other relatives may still carry a gene mutation associated with hereditary cancer. Genetic counseling is recommended for her brothers and extended maternal relatives to discuss their genetic testing options. If any of these relatives do pursue genetic testing, Belle is encouraged to contact me so that we may review the impact of their test results on her.    We also reviewed Belle's family history of ichthyosis. I explained that I spoke with my colleagues and it  would be reasonable for Belle's son to consider meeting with a dermatologist specializing in genetic skin conditions to confirm his diagnosis. Based on that evaluation, genetic testing may then be recommended. If Blele's son is not interested in such an evaluation at this time, he would be encouraged to readdress this history with his medical providers if he is considering family planning. Belle explained that he will speak to her son regarding these options and will contact me if he is interested in an evaluation at this time.    Summary:  We do not have an explanation for Belle's family history of cancer. While no genetic changes were identified, Belle may still be at risk for certain cancers due to family history, environmental factors, or other genetic causes not identified by this test.  Because of that, it is important that she continue with cancer screening based on her personal and family history as discussed above.    Genetic testing is rapidly advancing, and new cancer susceptibility genes will most likely be identified in the future. Therefore, I encouraged Belle to contact me regularly or if there are changes in her personal or family history. This may change how we assess her cancer risk, screening, and the testing we would offer.    Plan:  1. Belle will be mailed a copy of her test results.  2. She plans to follow-up with her medical providers, as needed.  3. A referral was placed for Belle to meet with Janae Frederick PA-C to discuss increased breast screening.  4. She should contact me regularly or sooner if her family history changes.    If Belle has any further questions, I encouraged her to contact me at [phone].    Florence Lofton MS, [facility] [place]  Licensed, Certified Genetic Counselor  Office: [phone]  Pager: [phone]

## 2021-06-23 NOTE — LETTER
6/23/2021         RE: Belle Tejada  [address] [facility] [place] [place] [address]        Dear Colleague,    Thank you for referring your patient, Belle Tejada, to the [facility]. Please see a copy of my visit note below.    6/23/2021    Belle is a 50 year old who is being evaluated via a billable video visit.      How would you like to obtain your AVS? MyChart  If the video visit is dropped, the invitation should be resent by: Text to cell phone: [phone]  Will anyone else be joining your video visit? No    Video-Visit Details  Type of service:  Video Visit  Video Start Time: 7:58am  Video End Time:8:18am  Originating Location (pt. Location): Home  Distant Location (provider location):  [facility]   Platform used for Video Visit: [employer]    Referring Provider: STARR Levy CNP    Presenting Information:  I spoke to Belle by video today to discuss her genetic testing results. We met on 5/11/2021 and her saliva sample was collected on 5/25/2021. The [facility] Breast [facility] Cancers Guidelines-Based Panel + Pancreatic Cancer Panel + BARD1 Gene was ordered from [facility]. This testing was done because of Belle's family history of breast and pancreatic cancer.    Genetic Testing Result: NEGATIVE  Belle is negative for mutations in the APC, SEBASTIAN, BARD1, BMPR1A, BRCA1, BRCA2, BRIP1, CDH1, CDKN2A, CHEK2, EPCAM, MEN1, MLH1, MSH2, MSH6, NBN, NF1, PALB2, PMS2, PTEN, RAD51C, RAD51D, SMAD4, STK11, TP53, TSC1, TSC2, and VHL genes. No mutations were found in any of the 28 genes analyzed. This test involved sequencing and deletion/duplication analysis of all genes with the exception of EPCAM (deletions/duplications only).    Testing did not detect an identifiable mutation associated with Hereditary Breast and Ovarian Cancer syndrome (BRCA1, BRCA2), Connelly syndrome (MLH1, MSH2, MSH6, PMS2, EPCAM), Familial Adenomatous Polyposis (APC),  "Hereditary Diffuse Gastric Cancer (CDH1), Familial Atypical Multiple Mole Melanoma syndrome (CDKN2A), Multiple Endocrine Neoplasia type 1 (MEN1), Juvenile Polyposis syndrome (BMPR1A, SMAD4), Cowden syndrome (PTEN), Li Fraumeni syndrome (TP53), Peutz-Jeghers syndrome (STK11), Tuberous sclerosis complex (TSC1, TSC2), Von Hippel-Lindau disease (VHL), and Neurofibromatosis type 1 (NF1).    A copy of the test report can be found in the Laboratory tab, dated 5/25/2021, and named \"[facility]". The report is scanned in as a linked document.    Interpretation:  We discussed several different interpretations of this negative test result.    1. One explanation may be that there is a different gene or combination of genes and environment that are associated with the cancers in this family that are not identifiable using this test. As such, Belle is encouraged to contact me regularly to review any new genetic testing options that may be appropriate for her.  2. Another explanation may be that her mother did have a mutation in one of these 28 genes and Belle did not inherit it.  3. There is also a small possibility that there is a mutation in one of these genes, and the testing laboratory could not find it with their current testing methods.       Screening:  Based on this negative test result, it is important for Belle and her relatives to refer back to the family history for appropriate cancer screening.      Based on her personal and family history, Belle has a 11-23.7% lifetime risk of developing breast cancer based on the Arden and ALMA 8 models, respectively. As such, Belle meets current National Comprehensive Cancer Network (NCCN) guidelines for high risk breast screening. This includes annual breast MRI in addition to annual mammogram beginning at age 40. In addition, Belle should be receiving clinical breast exams by her physician. We discussed that Belle could participate in our Cancer Risk Management " Program in which our Physician Assistant provides an individual screening plan and assists with medical management. A referral was made to see Janae Frederick PA-C, for this service.    Belle s close maternal female relatives also remain at increased risk for breast cancer given their family history. Breast screening options should be discussed with an individual's primary care provider and a genetic counselor, to determine at what age to begin screening, what screening is appropriate, and if additional screening (such as breast MRI) is necessary based on personal/family history factors.    Other population cancer screening options, such as those recommended by the American Cancer Society and NCCN, are also appropriate for Belle and her family. These screening recommendations may change if there are changes to Belle's personal and/or family history of cancer. Final screening recommendations should be made by each individual's managing physician.      Inheritance:  We reviewed the autosomal dominant inheritance of mutations in these 28 genes. We discussed that Belle did not pass on an identifiable mutation in these genes to her children based on this test result. Mutations in these genes do not skip generations.      Additional Testing Considerations:  We reviewed that it is still possible Belle does carry a currently identifiable gene or combination of genes and environment that increase her risk for certain cancers. We again reviewed the option of larger gene panels covering more moderate risk genes associated with breast, pancreatic, and related cancers. Belle is encouraged to contact me if she wishes to readdress these larger gene panel options.     Also, although Belle's genetic testing result was negative, other relatives may still carry a gene mutation associated with hereditary cancer. Genetic counseling is recommended for her brothers and extended maternal relatives to discuss their genetic testing  options. If any of these relatives do pursue genetic testing, Belle is encouraged to contact me so that we may review the impact of their test results on her.    We also reviewed Belle's family history of ichthyosis. I explained that I spoke with my colleagues and it would be reasonable for Belle's son to consider meeting with a dermatologist specializing in genetic skin conditions to confirm his diagnosis. Based on that evaluation, genetic testing may then be recommended. If Belle's son is not interested in such an evaluation at this time, he would be encouraged to readdress this history with his medical providers if he is considering family planning. Belle explained that he will speak to her son regarding these options and will contact me if he is interested in an evaluation at this time.    Summary:  We do not have an explanation for Belle's family history of cancer. While no genetic changes were identified, Belle may still be at risk for certain cancers due to family history, environmental factors, or other genetic causes not identified by this test.  Because of that, it is important that she continue with cancer screening based on her personal and family history as discussed above.    Genetic testing is rapidly advancing, and new cancer susceptibility genes will most likely be identified in the future. Therefore, I encouraged Belle to contact me regularly or if there are changes in her personal or family history. This may change how we assess her cancer risk, screening, and the testing we would offer.    Plan:  1. Belle will be mailed a copy of her test results.  2. She plans to follow-up with her medical providers, as needed.  3. A referral was placed for Belle to meet with Janae Frederick PA-C to discuss increased breast screening.  4. She should contact me regularly or sooner if her family history changes.    If Belle has any further questions, I encouraged her to contact me at  [phone].    Florence Lofton MS, [facility] [place]  Licensed, Certified Genetic Counselor  Office: [phone]  Pager: [phone]      Again, thank you for allowing me to participate in the care of your patient.        Sincerely,        Florence Lofton, GC

## 2021-07-05 NOTE — PATIENT INSTRUCTIONS
Negative Genetic Test Result    Genetic Testing  You had a blood test that looked at the genetic information in one or more genes associated with increased cancer risk.  The testing looked for any harmful changes that would stop this particular gene from working like it should. If an individual does not have any harmful changes or variants of unknown significance found from their blood test, their genetic test result is reported as negative.       Results  The genetic test did not identify any pathogenic (harmful) changes in the genes that were tested. There are several possible explanations for a negative test result. Without knowing the gene mutation in your family, the cause of the cancer in you or your relatives is still unknown. Your genetic counselor can help interpret the result for you and your relatives. In this case, there are several reasons that may explain the negative test result:    There may be a gene mutation in the family that you did not inherit.     You may have a gene mutation in a different gene that was not included in the test, or has not yet been discovered.     The cancers in you or your family may be due to a combination of genetic factors and environment (multifactorial/familial).    The cancers in you or your family may be sporadic/random cancers.    There is very small chance that a mutation was not found by current testing methods.  As testing technology evolves over time, it may still be possible to identify a mutation in a gene that was not found on this test.    It is important to note which genes were included in your test. A list of these genes can be found on your test result.    Screening Recommendations  Due to this negative test result, cancer screening recommendations should be based on your personal and family history. This may include increased cancer screening for you and/or your family members. Your genetic counselor and health care provider can help make  appropriate recommendations.      Please call us if you have any questions or concerns.   Cancer Risk Management Program [phone] ([phone])  ? Cristo Luong, MS, [facility] [phone]  ? Danica Bashir, MS, [facility]  [phone]  ? Debra Guzman, MS, [facility]  [phone]  ? Florence Lofton, MS, [facility] [phone]  ? Radha Borja, MS, [facility] [phone]  ? Denia Tyler, MS, [facility]  [phone]

## 2021-07-21 NOTE — PROGRESS NOTES
NEW CONSULTATION   2021     Belle Tejada is a 50 year old woman who presents with family history of breast cancer. She was referred by Dr. Lofton.    HPI:    Family history of breast cancer in her mother who was diagnosed at age 58. Belle had genetic testing last month that was negative.     She denies any breast concerns today including mass, skin change, nipple inversion or nipple discharge.     BREAST-SPECIFIC HISTORY:    Previous breast imaging: Yes   - 11 Smammo BI-RADS 1  - 14 Smammo BI-RADS 1  - 7/20/15 Smammo: right breast asymmetry 10:00 BI-RADS 0  - 7/30/15 right Dmammo and ultrasound BI-RADS 1  - 16 Smammo  Smammo BI-RADS 1  - 10/31/17 Smammo BI-RADS 1  - 18 Smammo BI-RADS 1  - 19  Smammo BI-RADS 1  - 21  Smammo BI-RADS 1    Prior breast biopsies/surgeries: No    Prior history of breast cancer or DCIS: No  Prior radiation history: No   Self breast exams: Yes  Breast density: heterogeneously dense    GYN HISTORY:  . Age at 1st pregnancy: 30. Breastfeeding history: Yes.   Age at menarche: 10  Menopausal: has IUD, feels like she is perimenopausal.   Menopausal hormone replacement therapy: No       RISK ASSESSMENT: < 3% 5 year risk and > 20% lifetime risk   Marni: 2.1% 5 year risk   ALMA/Gabriellaer-Lenniezick: 31.6%   Arden: 11$ lifetime risk    FAMILY HISTORY:  Breast ca: Yes   - mother, 58  Ovarian ca: No  Pancreatic ca: Yes   - mother  Prostate: No  Gastric ca: No  Melanoma: No  Thyroid cancer: yes  - maternal aunt's daughter with thyroid cancer  Colon ca: No  Other cancer: Yes   - paternal grandfather with lung cancer.   Other genetic, testing, syndromes, or clotting disorders: no     PAST MEDICAL HISTORY  Past Medical History:   Diagnosis Date     CARDIOVASCULAR SCREENING; LDL GOAL LESS THAN 160 2010     Cervical high risk HPV (human papillomavirus) test positive 2021     Unspecified hypothyroidism     Hypothyroidism     PAST SURGICAL HISTORY  "  Past Surgical History:   Procedure Laterality Date     COLONOSCOPY N/A 2021    Procedure: COLONOSCOPY, WITH POLYPECTOMY;  Surgeon: Fannie Huff MD;  [place] [facility] [place]     [facility] NONSPECIFIC PROCEDURE  2005         [facility] NONSPECIFIC PROCEDURE  10/78    Tonsillectomy     MEDICATIONS  Current Outpatient Medications   Medication Sig Dispense Refill     hydrocortisone (WESTCORT) 0.2 % cream Apply sparingly to affected area three times daily for 14 days. 30 g 0     levonorgestrel (MIRENA, 52 MG,) 20 MCG/24HR IUD 1 each by Intrauterine route once       levothyroxine (SYNTHROID/LEVOTHROID) 125 MCG tablet   3   Contraception: IUD mirena     ALLERGIES  Allergies   Allergen Reactions     No Known Drug Allergies       SOCIAL HISTORY:  Smokes: No  EtOH: Yes 2 glasses of wine per night  Exercise: walking and bike   Works as  at [employer], works int [employer] and at [employer]    ROS:  Change in vision No  Headaches: no  Respiratory: No shortness of breath, dyspnea on exertion, cough, or hemoptysis   Cardiovascular: negative   Gastrointestinal: negative Abdominal pain: no  Breast: negative  Musculoskeletal: negative Joint pain No Back pain: no  Psychiatric: negative  Hematologic/Lymphatic/Immunologic: negative  Endocrine: negative    EXAM  /85 (BP Location: Right arm, Patient Position: Chair, Cuff Size: Adult Regular)   Pulse 89   Temp 98.4  F (36.9  C)   Resp 16   Ht 1.626 m (5' 4.02\")   Wt 77.7 kg (171 lb 3.2 oz)   SpO2 98%   BMI 29.37 kg/m     PHYSICAL EXAM  Respiratory: breathing non labored.   Breasts: Examination was done in both the upright and supine positions.  Breasts are symmetrical . No dominant fixed or suspicious masses noted. No skin or nipple changes. No nipple discharge.   No clavicular, cervical, or axillary lymphadenopathy.     ASSESSMENT/PLAN:    Belle Tejada is a 50 year old woman with family history of breast cancer. She meets NCCN guidelines for high risk " screening.     1) Family history of breast cacner.   Discussed she meets NCCN guidelines for high risk screening based on family history with lifetime risk for breast cancer of >20%. Screening recommendations based on NCCN guidelines. Clinical encounter every 6-12 months starting now. Annual mammogram alternating with annual breast MRI.    - Breast MRI: 10/2021  - Screening mammogram with clinic visit due: 4/5/22    2) Counseling was provided with available strategies including lifestyle modifications and risk reducing intervention.    - Maintain a healthy weight (BMI 20-25). Higher body mass index (BMI) and adult weight gain is associated with increased risk for breast cancer. This increase in risk has been attributed to increase in circulating endogenous estrogen levels from fat tissue.   - Alcohol consumption, even at moderate levels (1-2 drinks per day), increases breast cancer risk and are best avoided. If you choose to drink alcohol limit alcohol consumption to less than 1 drink per day. (1 ounce of liquor, 6 ounces of wine, or 8 ounces of beer)  - Be active daily and void being sedentary. Take part in at least 150-300 minutes of moderate-intensity physical activity per week.     Janae Frederick PA-C    30 minutes spent on the date of the encounter doing chart review, review of test results, interpretation of tests, patient visit and documentation.

## 2021-07-22 ENCOUNTER — OFFICE VISIT (OUTPATIENT)
Dept: SURGERY | Facility: CLINIC | Age: 51
End: 2021-07-22
Attending: GENETIC COUNSELOR, MS
Payer: COMMERCIAL

## 2021-07-22 VITALS
TEMPERATURE: 98.4 F | SYSTOLIC BLOOD PRESSURE: 121 MMHG | RESPIRATION RATE: 16 BRPM | HEART RATE: 89 BPM | DIASTOLIC BLOOD PRESSURE: 85 MMHG | HEIGHT: 64 IN | OXYGEN SATURATION: 98 % | WEIGHT: 171.2 LBS | BODY MASS INDEX: 29.23 KG/M2

## 2021-07-22 DIAGNOSIS — Z80.0 FAMILY HISTORY OF PANCREATIC CANCER: ICD-10-CM

## 2021-07-22 DIAGNOSIS — Z91.89 AT HIGH RISK FOR BREAST CANCER: Primary | ICD-10-CM

## 2021-07-22 DIAGNOSIS — Z80.3 FAMILY HISTORY OF MALIGNANT NEOPLASM OF BREAST: ICD-10-CM

## 2021-07-22 PROCEDURE — G0463 HOSPITAL OUTPT CLINIC VISIT: HCPCS

## 2021-07-22 PROCEDURE — 99203 OFFICE O/P NEW LOW 30 MIN: CPT | Performed by: PHYSICIAN ASSISTANT

## 2021-07-22 ASSESSMENT — MIFFLIN-ST. JEOR: SCORE: 1371.81

## 2021-07-22 ASSESSMENT — PAIN SCALES - GENERAL: PAINLEVEL: NO PAIN (0)

## 2021-07-22 NOTE — LETTER
2021         RE: Belle Tejada   [address] [facility] [place] [address]        Dear Colleague,    Thank you for referring your patient, Belle Tejada, to the [facility]. Please see a copy of my visit note below.    NEW CONSULTATION   2021     Belle Tejada is a 50 year old woman who presents with family history of breast cancer. She was referred by Dr. Lofton.    HPI:    Family history of breast cancer in her mother who was diagnosed at age 58. Belle had genetic testing last month that was negative.     She denies any breast concerns today including mass, skin change, nipple inversion or nipple discharge.     BREAST-SPECIFIC HISTORY:    Previous breast imaging: Yes   - 13 Smammo BI-RADS 1  - 14 Smammo BI-RADS 1  - 7/20/15 Smammo: right breast asymmetry 10:00 BI-RADS 0  - 7/30/15 right Dmammo and ultrasound BI-RADS 1  - 16 Smammo  Smammo BI-RADS 1  - 10/31/17 Smammo BI-RADS 1  - 18 Smammo BI-RADS 1  - 19  Smammo BI-RADS 1  - 21  Smammo BI-RADS 1    Prior breast biopsies/surgeries: No    Prior history of breast cancer or DCIS: No  Prior radiation history: No   Self breast exams: Yes  Breast density: heterogeneously dense    GYN HISTORY:  . Age at 1st pregnancy: 30. Breastfeeding history: Yes.   Age at menarche: 10  Menopausal: has IUD, feels like she is perimenopausal.   Menopausal hormone replacement therapy: No       RISK ASSESSMENT: < 3% 5 year risk and > 20% lifetime risk   Mrani: 2.1% 5 year risk   ALMA/Tyrer-Cuzick: 31.6%   Arden: 11$ lifetime risk    FAMILY HISTORY:  Breast ca: Yes   - mother, 58  Ovarian ca: No  Pancreatic ca: Yes   - mother  Prostate: No  Gastric ca: No  Melanoma: No  Thyroid cancer: yes  - maternal aunt's daughter with thyroid cancer  Colon ca: No  Other cancer: Yes   - paternal grandfather with lung cancer.   Other genetic, testing, syndromes, or clotting disorders: no     PAST MEDICAL HISTORY  Past  "Medical History:   Diagnosis Date     CARDIOVASCULAR SCREENING; LDL GOAL LESS THAN 160 2010     Cervical high risk HPV (human papillomavirus) test positive 2021     Unspecified hypothyroidism     Hypothyroidism     PAST SURGICAL HISTORY   Past Surgical History:   Procedure Laterality Date     COLONOSCOPY N/A 2021    Procedure: COLONOSCOPY, WITH POLYPECTOMY;  Surgeon: Fannie Huff MD;  [place] [facility] [place]     [facility] NONSPECIFIC PROCEDURE  2005         [facility] NONSPECIFIC PROCEDURE  10/78    Tonsillectomy     MEDICATIONS  Current Outpatient Medications   Medication Sig Dispense Refill     hydrocortisone (WESTCORT) 0.2 % cream Apply sparingly to affected area three times daily for 14 days. 30 g 0     levonorgestrel (MIRENA, 52 MG,) 20 MCG/24HR IUD 1 each by Intrauterine route once       levothyroxine (SYNTHROID/LEVOTHROID) 125 MCG tablet   3   Contraception: IUD mirena     ALLERGIES  Allergies   Allergen Reactions     No Known Drug Allergies       SOCIAL HISTORY:  Smokes: No  EtOH: Yes 2 glasses of wine per night  Exercise: walking and bike   Works as  at [employer], works [employer] and at [employer]    ROS:  Change in vision No  Headaches: no  Respiratory: No shortness of breath, dyspnea on exertion, cough, or hemoptysis   Cardiovascular: negative   Gastrointestinal: negative Abdominal pain: no  Breast: negative  Musculoskeletal: negative Joint pain No Back pain: no  Psychiatric: negative  Hematologic/Lymphatic/Immunologic: negative  Endocrine: negative    EXAM  /85 (BP Location: Right arm, Patient Position: Chair, Cuff Size: Adult Regular)   Pulse 89   Temp 98.4  F (36.9  C)   Resp 16   Ht 1.626 m (5' 4.02\")   Wt 77.7 kg (171 lb 3.2 oz)   SpO2 98%   BMI 29.37 kg/m     PHYSICAL EXAM  Respiratory: breathing non labored.   Breasts: Examination was done in both the upright and supine positions.  Breasts are symmetrical . No dominant fixed or suspicious " masses noted. No skin or nipple changes. No nipple discharge.   No clavicular, cervical, or axillary lymphadenopathy.     ASSESSMENT/PLAN:    Belle Tejada is a 50 year old woman with family history of breast cancer. She meets NCCN guidelines for high risk screening.     1) Family history of breast cacner.   Discussed she meets NCCN guidelines for high risk screening based on family history with lifetime risk for breast cancer of >20%. Screening recommendations based on NCCN guidelines. Clinical encounter every 6-12 months starting now. Annual mammogram alternating with annual breast MRI.    - Breast MRI: 10/2021  - Screening mammogram with clinic visit due: 4/5/22    2) Counseling was provided with available strategies including lifestyle modifications and risk reducing intervention.    - Maintain a healthy weight (BMI 20-25). Higher body mass index (BMI) and adult weight gain is associated with increased risk for breast cancer. This increase in risk has been attributed to increase in circulating endogenous estrogen levels from fat tissue.   - Alcohol consumption, even at moderate levels (1-2 drinks per day), increases breast cancer risk and are best avoided. If you choose to drink alcohol limit alcohol consumption to less than 1 drink per day. (1 ounce of liquor, 6 ounces of wine, or 8 ounces of beer)  - Be active daily and void being sedentary. Take part in at least 150-300 minutes of moderate-intensity physical activity per week.     Janae Frederick PA-C    30 minutes spent on the date of the encounter doing chart review, review of test results, interpretation of tests, patient visit and documentation.         Again, thank you for allowing me to participate in the care of your patient.        Sincerely,        Janae Frederick PA-C

## 2021-07-22 NOTE — PATIENT INSTRUCTIONS
Belle Tejada is a 50 year old woman with family history of breast cancer. She meets NCCN guidelines for high risk screening.     1) Family history of breast cacner.   Discussed she meets NCCN guidelines for high risk screening based on family history with lifetime risk for breast cancer of >20%. Screening recommendations based on NCCN guidelines. Clinical encounter every 6-12 months starting now. Annual mammogram alternating with annual breast MRI.    - Breast MRI: 10/2021  - Screening mammogram with clinic visit due: 4/5/22    2) Counseling was provided with available strategies including lifestyle modifications and risk reducing intervention.    - Maintain a healthy weight (BMI 20-25). Higher body mass index (BMI) and adult weight gain is associated with increased risk for breast cancer. This increase in risk has been attributed to increase in circulating endogenous estrogen levels from fat tissue.   - Alcohol consumption, even at moderate levels (1-2 drinks per day), increases breast cancer risk and are best avoided. If you choose to drink alcohol limit alcohol consumption to less than 1 drink per day. (1 ounce of liquor, 6 ounces of wine, or 8 ounces of beer)  - Be active daily and void being sedentary. Take part in at least 150-300 minutes of moderate-intensity physical activity per week.

## 2021-09-18 ENCOUNTER — HEALTH MAINTENANCE LETTER (OUTPATIENT)
Age: 51
End: 2021-09-18

## 2021-10-04 ENCOUNTER — ANCILLARY PROCEDURE (OUTPATIENT)
Dept: MRI IMAGING | Facility: CLINIC | Age: 51
End: 2021-10-04
Attending: PHYSICIAN ASSISTANT
Payer: COMMERCIAL

## 2021-10-04 DIAGNOSIS — Z91.89 AT HIGH RISK FOR BREAST CANCER: ICD-10-CM

## 2021-10-04 PROCEDURE — A9585 GADOBUTROL INJECTION: HCPCS | Performed by: RADIOLOGY

## 2021-10-04 PROCEDURE — 77049 MRI BREAST C-+ W/CAD BI: CPT | Performed by: RADIOLOGY

## 2021-10-04 RX ORDER — GADOBUTROL 604.72 MG/ML
7.5 INJECTION INTRAVENOUS ONCE
Status: COMPLETED | OUTPATIENT
Start: 2021-10-04 | End: 2021-10-04

## 2021-10-04 RX ADMIN — GADOBUTROL 7.5 ML: 604.72 INJECTION INTRAVENOUS at 08:48

## 2022-04-26 NOTE — PROGRESS NOTES
FOLLOW UP  2022     Belle Tejada is a 51 year old woman who presents with family history of breast cancer. She was referred by Dr. Lofton.     HPI:     Family history of breast cancer in her mother who was diagnosed at age 58. Belle had genetic testing that was negative.      She denies any breast concerns today including mass, skin change, nipple inversion or nipple discharge.      BREAST-SPECIFIC HISTORY:     Previous breast imaging: Yes   - 13 Smammo BI-RADS 1  - 14 Smammo BI-RADS 1  - 7/20/15 Smammo: right breast asymmetry 10:00 BI-RADS 0  - 7/30/15 right Dmammo and ultrasound BI-RADS 1  - 16 Smammo  Smammo BI-RADS 1  - 10/31/17 Smammo BI-RADS 1  - 18 Smammo BI-RADS 1  - 19  Smammo BI-RADS 1  - 21  Smammo BI-RADS 1  - 10/4/21 breast MRI BI-RADS 2     Prior breast biopsies/surgeries: No     Prior history of breast cancer or DCIS: No  Prior radiation history: No   Self breast exams: Yes  Breast density: heterogeneously dense     GYN HISTORY:  . Age at 1st pregnancy: 30. Breastfeeding history: Yes.   Age at menarche: 10  Menopausal: has IUD, feels like she is perimenopausal.   Menopausal hormone replacement therapy: No         RISK ASSESSMENT: < 3% 5 year risk and > 20% lifetime risk   Marni: 2.2% 5 year risk   ALMA/Gabriellaer-Lenniezick: 27.3%   Arden: 11% lifetime risk     FAMILY HISTORY:  Breast ca: Yes   - mother, 58  Ovarian ca: No  Pancreatic ca: Yes   - mother  Prostate: No  Gastric ca: No  Melanoma: No  Thyroid cancer: yes  - maternal aunt's daughter with thyroid cancer  Colon ca: No  Other cancer: Yes   - paternal grandfather with lung cancer.   Other genetic, testing, syndromes, or clotting disorders: no     PAST MEDICAL HISTORY  Past Medical History:   Diagnosis Date     CARDIOVASCULAR SCREENING; LDL GOAL LESS THAN 160 2010     Cervical high risk HPV (human papillomavirus) test positive 2021     Unspecified hypothyroidism     Hypothyroidism      PAST SURGICAL HISTORY   Past Surgical History:   Procedure Laterality Date     COLONOSCOPY N/A 2021    Procedure: COLONOSCOPY, WITH POLYPECTOMY;  Surgeon: Fannie Huff MD;  Location: [facility] [place] [facility]     [facility] NONSPECIFIC PROCEDURE  2005         [facility] NONSPECIFIC PROCEDURE  10/78    Tonsillectomy     MEDICATIONS  Current Outpatient Medications   Medication Sig Dispense Refill     hydrocortisone (WESTCORT) 0.2 % cream Apply sparingly to affected area three times daily for 14 days. 30 g 0     levonorgestrel (MIRENA, 52 MG,) 20 MCG/24HR IUD 1 each by Intrauterine route once       levothyroxine (SYNTHROID/LEVOTHROID) 125 MCG tablet   3   Contraception: IUD mirena     ALLERGIES  Allergies   Allergen Reactions     No Known Drug Allergies         SOCIAL HISTORY:  Smokes: No  EtOH: weekends  Exercise: walking and bike, weight training  Works as  at the [employer], works int [employer] and at home    ROS:  Change in vision No  Headaches: no  Respiratory: No shortness of breath, dyspnea on exertion, cough, or hemoptysis   Cardiovascular: negative   Gastrointestinal: negative Abdominal pain: no  Breast: negative  Musculoskeletal: negative Joint pain No Back pain: no  Psychiatric: negative  Hematologic/Lymphatic/Immunologic: negative  Endocrine: negative    EXAM  /86   Pulse 74   Temp 98.1  F (36.7  C) (Oral)   Wt 82.1 kg (181 lb)   SpO2 98%   BMI 31.05 kg/m     PHYSICAL EXAM  Respiratory: breathing non labored.   Breasts: Examination was done in both the upright and supine positions.  Breasts are symmetrical . No dominant fixed or suspicious masses noted. No skin or nipple changes. No nipple discharge.   No clavicular, cervical, or axillary lymphadenopathy.     INVESTIGATIONS:    22 screening mammogram: per Radiology no concerning findings, final report pending.     ASSESSMENT/PLAN:    Belle Tejada is a 51 year old woman with family history of breast cancer. She meets NCCN  guidelines for high risk screening.      1) Family history of breast cacner.   Discussed she meets NCCN guidelines for high risk screening based on family history with lifetime risk for breast cancer of >20%. Screening recommendations based on NCCN guidelines. Clinical encounter every 6-12 months starting now. Annual mammogram alternating with annual breast MRI.    - Breast MRI with clinic visit due 10/2022  - Screening mammogram with clinic visit due: 4/29/23     2) Counseling was provided with available strategies including lifestyle modifications and risk reducing intervention.  - Maintain your best healthy weight. Higher body fat and adult weight gain is associated with increased risk for breast cancer. This increase in risk has been attributed to increase in circulating endogenous estrogen levels from fat tissue.   - Alcohol can raise estrogen. Alcohol consumption, even at moderate levels (1-2 drinks per day), increases breast cancer risk and are best avoided. If you choose to drink alcohol limit alcohol consumption to less than 1 drink per day. (1 ounce of liquor, 6 ounces of wine, or 8 ounces of beer).  - Be active daily and void being sedentary.     Janae Frederick PA-C    20 minutes spent on the date of the encounter doing chart review, review of test results, interpretation of tests, patient visit and documentation.

## 2022-04-28 ENCOUNTER — ANCILLARY PROCEDURE (OUTPATIENT)
Dept: MAMMOGRAPHY | Facility: CLINIC | Age: 52
End: 2022-04-28
Payer: COMMERCIAL

## 2022-04-28 ENCOUNTER — OFFICE VISIT (OUTPATIENT)
Dept: SURGERY | Facility: CLINIC | Age: 52
End: 2022-04-28
Attending: PHYSICIAN ASSISTANT
Payer: COMMERCIAL

## 2022-04-28 VITALS
OXYGEN SATURATION: 98 % | TEMPERATURE: 98.1 F | SYSTOLIC BLOOD PRESSURE: 122 MMHG | BODY MASS INDEX: 31.05 KG/M2 | DIASTOLIC BLOOD PRESSURE: 86 MMHG | HEART RATE: 74 BPM | WEIGHT: 181 LBS

## 2022-04-28 DIAGNOSIS — Z91.89 AT HIGH RISK FOR BREAST CANCER: Primary | ICD-10-CM

## 2022-04-28 DIAGNOSIS — Z12.31 VISIT FOR SCREENING MAMMOGRAM: ICD-10-CM

## 2022-04-28 DIAGNOSIS — Z12.39 BREAST CANCER SCREENING, HIGH RISK PATIENT: ICD-10-CM

## 2022-04-28 PROCEDURE — 77063 BREAST TOMOSYNTHESIS BI: CPT | Mod: GC | Performed by: STUDENT IN AN ORGANIZED HEALTH CARE EDUCATION/TRAINING PROGRAM

## 2022-04-28 PROCEDURE — G0463 HOSPITAL OUTPT CLINIC VISIT: HCPCS

## 2022-04-28 PROCEDURE — 77067 SCR MAMMO BI INCL CAD: CPT | Mod: GC | Performed by: STUDENT IN AN ORGANIZED HEALTH CARE EDUCATION/TRAINING PROGRAM

## 2022-04-28 PROCEDURE — 99213 OFFICE O/P EST LOW 20 MIN: CPT | Performed by: PHYSICIAN ASSISTANT

## 2022-04-28 ASSESSMENT — PAIN SCALES - GENERAL: PAINLEVEL: NO PAIN (0)

## 2022-04-28 NOTE — NURSING NOTE
"Oncology Rooming Note    April 28, 2022 7:33 AM   Belle Tejada is a 51 year old female who presents for:    Chief Complaint   Patient presents with     Oncology Clinic Visit     Rtn for family history of breast cancer     Initial Vitals: /86   Pulse 74   Temp 98.1  F (36.7  C) (Oral)   Wt 82.1 kg (181 lb)   SpO2 98%   BMI 31.05 kg/m   Estimated body mass index is 31.05 kg/m  as calculated from the following:    Height as of 7/22/21: 1.626 m (5' 4.02\").    Weight as of this encounter: 82.1 kg (181 lb). Body surface area is 1.93 meters squared.  No Pain (0) Comment: Data Unavailable   No LMP recorded. (Menstrual status: IUD).  Allergies reviewed: Yes  Medications reviewed: Yes    Medications: Medication refills not needed today.  Pharmacy name entered into EPIC:    [id number] - [place], [place] - [address] AT [facility] & [address] [place], [place] - [address] [place], [place] [address]  [facility][id number] - [place], [place] - [address] AT [facility] [address]    Clinical concerns: none       Micki Brannon, EMT            "

## 2022-04-28 NOTE — PATIENT INSTRUCTIONS
Belle Tejada is a 51 year old woman with family history of breast cancer. She meets NCCN guidelines for high risk screening.      1) Family history of breast cacner.   Discussed she meets NCCN guidelines for high risk screening based on family history with lifetime risk for breast cancer of >20%. Screening recommendations based on NCCN guidelines. Clinical encounter every 6-12 months starting now. Annual mammogram alternating with annual breast MRI.    - Breast MRI with clinic visit due 10/2022  - Screening mammogram with clinic visit due: 4/29/23     2) Counseling was provided with available strategies including lifestyle modifications and risk reducing intervention.  - Maintain your best healthy weight. Higher body fat and adult weight gain is associated with increased risk for breast cancer. This increase in risk has been attributed to increase in circulating endogenous estrogen levels from fat tissue.   - Alcohol can raise estrogen. Alcohol consumption, even at moderate levels (1-2 drinks per day), increases breast cancer risk and are best avoided. If you choose to drink alcohol limit alcohol consumption to less than 1 drink per day. (1 ounce of liquor, 6 ounces of wine, or 8 ounces of beer).  - Be active daily and void being sedentary.

## 2022-04-28 NOTE — LETTER
2022     RE: Belle Tejada  [address]  [facility] [address]    Dear Colleague,    Thank you for referring your patient, Belle Tejada, to the [facility]. Please see a copy of my visit note below.    FOLLOW UP  2022     Belle Tejada is a 51 year old woman who presents with family history of breast cancer. She was referred by Dr. Lofton.     HPI:     Family history of breast cancer in her mother who was diagnosed at age 58. Belle had genetic testing that was negative.      She denies any breast concerns today including mass, skin change, nipple inversion or nipple discharge.      BREAST-SPECIFIC HISTORY:     Previous breast imaging: Yes   - 13 Smammo BI-RADS 1  - 14 Smammo BI-RADS 1  - 7/20/15 Smammo: right breast asymmetry 10:00 BI-RADS 0  - 7/30/15 right Dmammo and ultrasound BI-RADS 1  - 16 Smammo  Smammo BI-RADS 1  - 10/31/ Smammo BI-RADS 1  - 18 Smammo BI-RADS 1  - /  Smammo BI-RADS 1  - 21  Smammo BI-RADS 1  - 10/4/21 breast MRI BI-RADS 2     Prior breast biopsies/surgeries: No     Prior history of breast cancer or DCIS: No  Prior radiation history: No   Self breast exams: Yes  Breast density: heterogeneously dense     GYN HISTORY:  . Age at 1st pregnancy: 30. Breastfeeding history: Yes.   Age at menarche: 10  Menopausal: has IUD, feels like she is perimenopausal.   Menopausal hormone replacement therapy: No         RISK ASSESSMENT: < 3% 5 year risk and > 20% lifetime risk   Marni: 2.2% 5 year risk   ALMA/Tyrer-Cuzick: 27.3%   Arden: 11% lifetime risk     FAMILY HISTORY:  Breast ca: Yes   - mother, 58  Ovarian ca: No  Pancreatic ca: Yes   - mother  Prostate: No  Gastric ca: No  Melanoma: No  Thyroid cancer: yes  - maternal aunt's daughter with thyroid cancer  Colon ca: No  Other cancer: Yes   - paternal grandfather with lung cancer.   Other genetic, testing, syndromes, or clotting disorders: no     PAST MEDICAL  HISTORY  Past Medical History:   Diagnosis Date     CARDIOVASCULAR SCREENING; LDL GOAL LESS THAN 160 2010     Cervical high risk HPV (human papillomavirus) test positive 2021     Unspecified hypothyroidism     Hypothyroidism     PAST SURGICAL HISTORY   Past Surgical History:   Procedure Laterality Date     COLONOSCOPY N/A 2021    Procedure: COLONOSCOPY, WITH POLYPECTOMY;  Surgeon: Fannie Huff MD;  [place] [facility] [place]     [facility] NONSPECIFIC PROCEDURE  2005         [facility] NONSPECIFIC PROCEDURE  10/78    Tonsillectomy     MEDICATIONS  Current Outpatient Medications   Medication Sig Dispense Refill     hydrocortisone (WESTCORT) 0.2 % cream Apply sparingly to affected area three times daily for 14 days. 30 g 0     levonorgestrel (MIRENA, 52 MG,) 20 MCG/24HR IUD 1 each by Intrauterine route once       levothyroxine (SYNTHROID/LEVOTHROID) 125 MCG tablet   3   Contraception: IUD mirena     ALLERGIES  Allergies   Allergen Reactions     No Known Drug Allergies         SOCIAL HISTORY:  Smokes: No  EtOH: weekends  Exercise: walking and bike, weight training  Works as  at the [employer], works int [employer] and at home    ROS:  Change in vision No  Headaches: no  Respiratory: No shortness of breath, dyspnea on exertion, cough, or hemoptysis   Cardiovascular: negative   Gastrointestinal: negative Abdominal pain: no  Breast: negative  Musculoskeletal: negative Joint pain No Back pain: no  Psychiatric: negative  Hematologic/Lymphatic/Immunologic: negative  Endocrine: negative    EXAM  /86   Pulse 74   Temp 98.1  F (36.7  C) (Oral)   Wt 82.1 kg (181 lb)   SpO2 98%   BMI 31.05 kg/m     PHYSICAL EXAM  Respiratory: breathing non labored.   Breasts: Examination was done in both the upright and supine positions.  Breasts are symmetrical . No dominant fixed or suspicious masses noted. No skin or nipple changes. No nipple discharge.   No clavicular, cervical, or axillary  lymphadenopathy.     INVESTIGATIONS:    4/28/22 screening mammogram: per Radiology no concerning findings, final report pending.     ASSESSMENT/PLAN:    Belle Tejada is a 51 year old woman with family history of breast cancer. She meets NCCN guidelines for high risk screening.      1) Family history of breast cacner.   Discussed she meets NCCN guidelines for high risk screening based on family history with lifetime risk for breast cancer of >20%. Screening recommendations based on NCCN guidelines. Clinical encounter every 6-12 months starting now. Annual mammogram alternating with annual breast MRI.    - Breast MRI with clinic visit due 10/2022  - Screening mammogram with clinic visit due: 4/29/23     2) Counseling was provided with available strategies including lifestyle modifications and risk reducing intervention.  - Maintain your best healthy weight. Higher body fat and adult weight gain is associated with increased risk for breast cancer. This increase in risk has been attributed to increase in circulating endogenous estrogen levels from fat tissue.   - Alcohol can raise estrogen. Alcohol consumption, even at moderate levels (1-2 drinks per day), increases breast cancer risk and are best avoided. If you choose to drink alcohol limit alcohol consumption to less than 1 drink per day. (1 ounce of liquor, 6 ounces of wine, or 8 ounces of beer).  - Be active daily and void being sedentary.     Janae Frederick PA-C    20 minutes spent on the date of the encounter doing chart review, review of test results, interpretation of tests, patient visit and documentation.         Again, thank you for allowing me to participate in the care of your patient.        Sincerely,        Janae Frederick PA-C

## 2022-04-29 ENCOUNTER — PATIENT OUTREACH (OUTPATIENT)
Dept: OBGYN | Facility: CLINIC | Age: 52
End: 2022-04-29
Payer: COMMERCIAL

## 2022-04-29 DIAGNOSIS — R87.810 CERVICAL HIGH RISK HPV (HUMAN PAPILLOMAVIRUS) TEST POSITIVE: Primary | ICD-10-CM

## 2022-06-07 ENCOUNTER — OFFICE VISIT (OUTPATIENT)
Dept: OBGYN | Facility: CLINIC | Age: 52
End: 2022-06-07
Payer: COMMERCIAL

## 2022-06-07 VITALS
BODY MASS INDEX: 30.44 KG/M2 | HEART RATE: 76 BPM | DIASTOLIC BLOOD PRESSURE: 92 MMHG | SYSTOLIC BLOOD PRESSURE: 141 MMHG | WEIGHT: 177.4 LBS

## 2022-06-07 DIAGNOSIS — B97.7 HPV IN FEMALE: Primary | ICD-10-CM

## 2022-06-07 PROCEDURE — 88175 CYTOPATH C/V AUTO FLUID REDO: CPT | Performed by: OBSTETRICS & GYNECOLOGY

## 2022-06-07 PROCEDURE — 99212 OFFICE O/P EST SF 10 MIN: CPT | Performed by: OBSTETRICS & GYNECOLOGY

## 2022-06-07 PROCEDURE — 87624 HPV HI-RISK TYP POOLED RSLT: CPT | Performed by: OBSTETRICS & GYNECOLOGY

## 2022-06-07 NOTE — PROGRESS NOTES
[facility]- OBGYN    CC: pap smear    S:Belle Tejada is a 51 year old  who presents today for pap smear.  She declines annual exam today  Patient reports no concerns.      OBGYN Hx:   OB History    Para Term  AB Living   4 2 2 0 2 2   SAB IAB Ectopic Multiple Live Births   2 0 0 0 2      # Outcome Date GA Lbr Warren/2nd Weight Sex Delivery Anes PTL Lv   4 Term 05 40w0d  3.515 kg (7 lb 12 oz) M CS   DOMINIC      Name: bridgett Berry Term 01 40w0d    CS   DOMINIC   2 SAB      SAB   DEC   1 SAB      SAB   DEC       No LMP recorded. (Menstrual status: IUD).  Menses: none.  Mirena IUD inserted 2017  STD Hx: HPV, declines GC/CT testing  Pap hx: 21 NIL HPV positive type 18  21 colposcopic biopsy and ECC negative     O: Patient Vitals for the past 24 hrs:   BP Pulse Weight   22 1105 (!) 141/92 76 80.5 kg (177 lb 6.4 oz)   ]  Exam:  General- awake, alert, answering questions appropriately, appears comfortable   CV- regular rate  Lung- breathing comfortably on room air  - normal appearing external genitalia, normal appearing vaginal mucosa, normal appearing cervix, small amount of thin white vaginal discharge, IUD strings visualized.      A&P:Belle Tejada is a 51 year old  who presents today for pap smear.     (B97.7) HPV in female  (primary encounter diagnosis)  Comment: Due for pap.  Plan: Pap diagnostic with HPV.  Follow up recommendations dependent upon pap results.           Lien Wade MD

## 2022-06-09 LAB
BKR LAB AP GYN ADEQUACY: NORMAL
BKR LAB AP GYN INTERPRETATION: NORMAL
BKR LAB AP HPV REFLEX: NORMAL
BKR LAB AP PREVIOUS ABNL DX: NORMAL
BKR LAB AP PREVIOUS ABNORMAL: NORMAL
PATH REPORT.COMMENTS IMP SPEC: NORMAL
PATH REPORT.COMMENTS IMP SPEC: NORMAL
PATH REPORT.RELEVANT HX SPEC: NORMAL

## 2022-06-13 LAB
HUMAN PAPILLOMA VIRUS 16 DNA: NEGATIVE
HUMAN PAPILLOMA VIRUS 18 DNA: NEGATIVE
HUMAN PAPILLOMA VIRUS FINAL DIAGNOSIS: NORMAL
HUMAN PAPILLOMA VIRUS OTHER HR: NEGATIVE

## 2022-06-25 ENCOUNTER — HEALTH MAINTENANCE LETTER (OUTPATIENT)
Age: 52
End: 2022-06-25

## 2022-11-20 ENCOUNTER — HEALTH MAINTENANCE LETTER (OUTPATIENT)
Age: 52
End: 2022-11-20

## 2022-12-08 NOTE — NURSING NOTE
"Oncology Rooming Note    July 22, 2021 8:01 AM   Belle Tejada is a 50 year old female who presents for:    Chief Complaint   Patient presents with     Oncology Clinic Visit     [facility] NEW - FAMILY HISTORY OF MALIGNANT NEOPLASM OF BREAST      Initial Vitals: /85 (BP Location: Right arm, Patient Position: Chair, Cuff Size: Adult Regular)   Pulse 89   Temp 98.4  F (36.9  C)   Resp 16   Ht 1.626 m (5' 4.02\")   Wt 77.7 kg (171 lb 3.2 oz)   SpO2 98%   BMI 29.37 kg/m   Estimated body mass index is 29.37 kg/m  as calculated from the following:    Height as of this encounter: 1.626 m (5' 4.02\").    Weight as of this encounter: 77.7 kg (171 lb 3.2 oz). Body surface area is 1.87 meters squared.  No Pain (0) Comment: Data Unavailable   No LMP recorded. (Menstrual status: IUD).  Allergies reviewed: Yes  Medications reviewed: Yes    Medications: Medication refills not needed today.  Pharmacy name entered into [facility]:    [id number] - [place], [place] - [address] AT [facility] [address] [facility] [place], [place] - [address]  [id number] - [place], [place] - [address] AT [facility] [address]    Clinical concerns: No new concerns. Janae Ferderick was notified.      Franklyn Christine LPN            "
Patient Education

## 2023-06-07 ENCOUNTER — ANCILLARY PROCEDURE (OUTPATIENT)
Dept: MAMMOGRAPHY | Facility: CLINIC | Age: 53
End: 2023-06-07
Attending: OBSTETRICS & GYNECOLOGY
Payer: COMMERCIAL

## 2023-06-07 DIAGNOSIS — Z12.31 VISIT FOR SCREENING MAMMOGRAM: ICD-10-CM

## 2023-06-07 PROCEDURE — 77067 SCR MAMMO BI INCL CAD: CPT

## 2023-06-07 PROCEDURE — 77063 BREAST TOMOSYNTHESIS BI: CPT | Mod: 26 | Performed by: RADIOLOGY

## 2023-06-07 PROCEDURE — 77067 SCR MAMMO BI INCL CAD: CPT | Mod: 26 | Performed by: RADIOLOGY

## 2023-07-02 ENCOUNTER — HEALTH MAINTENANCE LETTER (OUTPATIENT)
Age: 53
End: 2023-07-02

## 2023-07-26 ASSESSMENT — ENCOUNTER SYMPTOMS
SORE THROAT: 0
HEMATOCHEZIA: 0
NAUSEA: 0
FEVER: 0
ABDOMINAL PAIN: 0
JOINT SWELLING: 0
WEAKNESS: 0
MYALGIAS: 0
FREQUENCY: 0
COUGH: 0
NERVOUS/ANXIOUS: 0
HEADACHES: 0
PALPITATIONS: 0
SHORTNESS OF BREATH: 0
HEARTBURN: 0
BREAST MASS: 0
HEMATURIA: 0
DIZZINESS: 0
PARESTHESIAS: 0
ARTHRALGIAS: 0
EYE PAIN: 0
DIARRHEA: 0
CHILLS: 0
DYSURIA: 0
CONSTIPATION: 0

## 2023-08-02 ENCOUNTER — OFFICE VISIT (OUTPATIENT)
Dept: FAMILY MEDICINE | Facility: CLINIC | Age: 53
End: 2023-08-02
Payer: COMMERCIAL

## 2023-08-02 VITALS
OXYGEN SATURATION: 97 % | RESPIRATION RATE: 20 BRPM | HEART RATE: 66 BPM | BODY MASS INDEX: 30.54 KG/M2 | DIASTOLIC BLOOD PRESSURE: 88 MMHG | HEIGHT: 64 IN | WEIGHT: 178.9 LBS | TEMPERATURE: 97.2 F | SYSTOLIC BLOOD PRESSURE: 133 MMHG

## 2023-08-02 DIAGNOSIS — Z13.220 SCREENING FOR HYPERLIPIDEMIA: ICD-10-CM

## 2023-08-02 DIAGNOSIS — Z11.59 NEED FOR HEPATITIS C SCREENING TEST: ICD-10-CM

## 2023-08-02 DIAGNOSIS — Z13.1 ENCOUNTER FOR SCREENING FOR DIABETES MELLITUS: ICD-10-CM

## 2023-08-02 DIAGNOSIS — E03.9 ACQUIRED HYPOTHYROIDISM: ICD-10-CM

## 2023-08-02 DIAGNOSIS — Z00.00 ROUTINE GENERAL MEDICAL EXAMINATION AT A HEALTH CARE FACILITY: Primary | ICD-10-CM

## 2023-08-02 LAB — HBA1C MFR BLD: 5.4 % (ref 0–5.6)

## 2023-08-02 PROCEDURE — 80061 LIPID PANEL: CPT | Performed by: NURSE PRACTITIONER

## 2023-08-02 PROCEDURE — 90750 HZV VACC RECOMBINANT IM: CPT | Performed by: NURSE PRACTITIONER

## 2023-08-02 PROCEDURE — 90636 HEP A/HEP B VACC ADULT IM: CPT | Performed by: NURSE PRACTITIONER

## 2023-08-02 PROCEDURE — 83036 HEMOGLOBIN GLYCOSYLATED A1C: CPT | Performed by: NURSE PRACTITIONER

## 2023-08-02 PROCEDURE — 99396 PREV VISIT EST AGE 40-64: CPT | Mod: 25 | Performed by: NURSE PRACTITIONER

## 2023-08-02 PROCEDURE — 90471 IMMUNIZATION ADMIN: CPT | Performed by: NURSE PRACTITIONER

## 2023-08-02 PROCEDURE — 90472 IMMUNIZATION ADMIN EACH ADD: CPT | Performed by: NURSE PRACTITIONER

## 2023-08-02 PROCEDURE — 36415 COLL VENOUS BLD VENIPUNCTURE: CPT | Performed by: NURSE PRACTITIONER

## 2023-08-02 PROCEDURE — 90715 TDAP VACCINE 7 YRS/> IM: CPT | Performed by: NURSE PRACTITIONER

## 2023-08-02 ASSESSMENT — ENCOUNTER SYMPTOMS
PALPITATIONS: 0
ABDOMINAL PAIN: 0
COUGH: 0
BREAST MASS: 0
CHILLS: 0
EYE PAIN: 0
NERVOUS/ANXIOUS: 0
SHORTNESS OF BREATH: 0
DIARRHEA: 0
FREQUENCY: 0
PARESTHESIAS: 0
SORE THROAT: 0
MYALGIAS: 0
HEADACHES: 0
DYSURIA: 0
JOINT SWELLING: 0
FEVER: 0
HEMATOCHEZIA: 0
HEARTBURN: 0
CONSTIPATION: 0
ARTHRALGIAS: 0
HEMATURIA: 0
NAUSEA: 0
DIZZINESS: 0
WEAKNESS: 0

## 2023-08-02 NOTE — NURSING NOTE
Prior to immunization administration, verified patients identity using patient s name and date of birth. Please see Immunization Activity for additional information.     Screening Questionnaire for Adult Immunization    Are you sick today?   No   Do you have allergies to medications, food, a vaccine component or latex?   No   Have you ever had a serious reaction after receiving a vaccination?   No   Do you have a long-term health problem with heart, lung, kidney, or metabolic disease (e.g., diabetes), asthma, a blood disorder, no spleen, complement component deficiency, a cochlear implant, or a spinal fluid leak?  Are you on long-term aspirin therapy?   No   Do you have cancer, leukemia, HIV/AIDS, or any other immune system problem?   No   Do you have a parent, brother, or sister with an immune system problem?   No   In the past 3 months, have you taken medications that affect  your immune system, such as prednisone, other steroids, or anticancer drugs; drugs for the treatment of rheumatoid arthritis, Crohn s disease, or psoriasis; or have you had radiation treatments?   No   Have you had a seizure, or a brain or other nervous system problem?   No   During the past year, have you received a transfusion of blood or blood    products, or been given immune (gamma) globulin or antiviral drug?   No   For women: Are you pregnant or is there a chance you could become       pregnant during the next month?   No   Have you received any vaccinations in the past 4 weeks?   No     Immunization questionnaire answers were all negative.      Patient instructed to remain in clinic for 15 minutes afterwards, and to report any adverse reactions.     Screening performed by Munira Davis MA on 8/2/2023 at 5:05 PM.

## 2023-08-02 NOTE — PROGRESS NOTES
SUBJECTIVE:   CC: Belle is an 52 year old who presents for preventive health visit.       8/2/2023     3:43 PM   Additional Questions   Roomed by maksim   Accompanied by self     Healthy Habits:     Getting at least 3 servings of Calcium per day:  Yes    Bi-annual eye exam:  Yes    Dental care twice a year:  Yes    Sleep apnea or symptoms of sleep apnea:  None    Diet:  Regular (no restrictions)    Frequency of exercise:  4-5 days/week    Taking medications regularly:  No    Barriers to taking medications:  None    Medication side effects:  None    Additional concerns today:  No      Today's PHQ-2 Score:       8/2/2023     3:36 PM   PHQ-2 ( 1999 Pf[employer])   Q1: Little interest or pleasure in doing things 0   Q2: Feeling down, depressed or hopeless 0   PHQ-2 Score 0   Q1: Little interest or pleasure in doing things Not at all   Q2: Feeling down, depressed or hopeless Not at all   PHQ-2 Score 0       52 year old year old female  with PMH   Patient Active Problem List   Diagnosis Code    Acquired hypothyroidism E03.9    Insomnia G47.00    Adjustment disorder with anxious mood F43.22    Family history of pancreatic cancer Z80.0    Cervical high risk HPV (human papillomavirus) test positive R87.810    in clinic for preventive health care exam.     In addition to the preventive visit,   minutes of the appointment were spent evaluating and developing a treatment plan for her additional concern(s).          Social History     Tobacco Use    Smoking status: Never    Smokeless tobacco: Never   Substance Use Topics    Alcohol use: Yes     Comment: 5 drinks per week           7/26/2023    11:24 AM   Alcohol Use   Prescreen: >3 drinks/day or >7 drinks/week? No     Reviewed orders with patient.  Reviewed health maintenance and updated orders accordingly - Yes  Lab work is in process  Labs reviewed in EPIC  BP Readings from Last 3 Encounters:   08/02/23 133/88   06/07/22 (!) 141/92   04/28/22 122/86    Wt Readings from Last 3  Encounters:   08/02/23 81.1 kg (178 lb 14.4 oz)   06/07/22 80.5 kg (177 lb 6.4 oz)   04/28/22 82.1 kg (181 lb)                    Breast Cancer Screening:    FHS-7:       4/14/2021     9:12 AM 4/28/2022     8:13 AM 6/7/2023     9:10 AM   Breast CA Risk Assessment (FHS-7)   Did any of your first-degree relatives have breast or ovarian cancer? Yes Yes Yes   Did any of your relatives have bilateral breast cancer? Unknown No No   Did any man in your family have breast cancer? No No No   Did any woman in your family have breast and ovarian cancer? No No No   Did any woman in your family have breast cancer before age 50 y?  No No   Do you have 2 or more relatives with breast and/or ovarian cancer?  No No   Do you have 2 or more relatives with breast and/or bowel cancer?  No No     click delete button to remove this line now  Mammogram Screening: Recommended annual mammography  Pertinent mammograms are reviewed under the imaging tab.    History of abnormal Pap smear: NO - age 30-65 PAP every 5 years with negative HPV co-testing recommended      Latest Ref Rng & Units 6/7/2022    11:29 AM 4/14/2021     9:33 AM 4/14/2021     9:20 AM   PAP / HPV   PAP  Negative for Intraepithelial Lesion or Malignancy (NILM)      PAP (Historical)    NIL    HPV 16 DNA Negative Negative  Negative     HPV 18 DNA Negative Negative  Positive     Other HR HPV Negative Negative  Negative       Reviewed and updated as needed this visit by clinical staff   Tobacco  Allergies  Meds  Problems  Med Hx  Surg Hx  Fam Hx          Reviewed and updated as needed this visit by Provider   Tobacco  Allergies  Meds  Problems  Med Hx  Surg Hx  Fam Hx             Review of Systems   Constitutional:  Negative for chills and fever.   HENT:  Negative for congestion, ear pain, hearing loss and sore throat.    Eyes:  Negative for pain and visual disturbance.   Respiratory:  Negative for cough and shortness of breath.    Cardiovascular:  Negative for chest  "pain, palpitations and peripheral edema.   Gastrointestinal:  Negative for abdominal pain, constipation, diarrhea, heartburn, hematochezia and nausea.   Breasts:  Negative for tenderness, breast mass and discharge.   Genitourinary:  Negative for dysuria, frequency, genital sores, hematuria, pelvic pain, urgency, vaginal bleeding and vaginal discharge.   Musculoskeletal:  Negative for arthralgias, joint swelling and myalgias.   Skin:  Negative for rash.   Neurological:  Negative for dizziness, weakness, headaches and paresthesias.   Psychiatric/Behavioral:  Negative for mood changes. The patient is not nervous/anxious.           OBJECTIVE:   /88 (BP Location: Right arm, Patient Position: Sitting, Cuff Size: Adult Regular)   Pulse 66   Temp 97.2  F (36.2  C) (Temporal)   Resp 20   Ht 1.614 m (5' 3.54\")   Wt 81.1 kg (178 lb 14.4 oz)   SpO2 97%   BMI 31.15 kg/m    Physical Exam  GENERAL APPEARANCE: healthy, alert and no distress  EYES: Eyes grossly normal to inspection, PERRL and conjunctivae and sclerae normal  HENT: ear canals and TM's normal, nose and mouth without ulcers or lesions, oropharynx clear and oral mucous membranes moist  NECK: no adenopathy, no asymmetry, masses, or scars and thyroid normal to palpation  RESP: lungs clear to auscultation - no rales, rhonchi or wheezes  CV: regular rate and rhythm, normal S1 S2, no S3 or S4, no murmur, click or rub, no peripheral edema and peripheral pulses strong  ABDOMEN: soft, nontender, no hepatosplenomegaly, no masses and bowel sounds normal  MS: no musculoskeletal defects are noted and gait is age appropriate without ataxia  SKIN: no suspicious lesions or rashes  NEURO: Normal strength and tone, sensory exam grossly normal, mentation intact and speech normal  PSYCH: mentation appears normal and affect normal/bright    Diagnostic Test Results:  Labs reviewed in Epic    ASSESSMENT/PLAN:   Belle was seen today for physical.    Diagnoses and all orders for " this visit:    Routine general medical examination at a health care facility  Preventative exam w/no abnormalities and/or concerns listed in diagnoses; discussed health maintenance screenings including prostate, breast, cervical and colorectal ca screenings related to gender;  reviewed and reconciled medication, medical history and patient related health concerns  Plan: obtain metabolic labs    Need for hepatitis C screening test  Discussed; low risk; declined     Acquired hypothyroidism  Stable; managed by Endocrinology; levothyroxine 125 mcg ; upcoming appt w/[facility]    Screening for hyperlipidemia  The 10-year ASCVD risk score (Radha NAYAK, et al., 2019) is: 1.6%    Values used to calculate the score:      Age: 52 years      Sex: Female      Is Non- : No      Diabetic: No      Tobacco smoker: No      Systolic Blood Pressure: 133 mmHg      Is BP treated: No      HDL Cholesterol: 61 mg/dL      Total Cholesterol: 188 mg/dL    -     Lipid Profile; Future  -     Lipid Profile    Encounter for screening for diabetes mellitus  -     Hemoglobin A1c; Future  -     Hemoglobin A1c    Other orders  -     REVIEW OF HEALTH MAINTENANCE PROTOCOL ORDERS  -     ZOSTER RECOMBINANT ADJUVANTED (SHINGRIX)  -     TDAP 10-64Y (ADACEL,BOOSTRIX)  -     HEP A & B (TWINRIX)        Patient has been advised of split billing requirements and indicates understanding: Yes       COUNSELING:  Reviewed preventive health counseling, as reflected in patient instructions       Colorectal Cancer Screening       (Liat)menopause management        She reports that she has never smoked. She has never used smokeless tobacco.          STARR Bell CNP  [facility]

## 2023-08-03 LAB
CHOLEST SERPL-MCNC: 188 MG/DL
HDLC SERPL-MCNC: 61 MG/DL
LDLC SERPL CALC-MCNC: 112 MG/DL
NONHDLC SERPL-MCNC: 127 MG/DL
TRIGL SERPL-MCNC: 76 MG/DL

## 2023-11-01 ENCOUNTER — NURSE TRIAGE (OUTPATIENT)
Dept: ONCOLOGY | Facility: CLINIC | Age: 53
End: 2023-11-01
Payer: COMMERCIAL

## 2023-11-01 DIAGNOSIS — Z91.89 AT HIGH RISK FOR BREAST CANCER: Primary | ICD-10-CM

## 2023-11-01 NOTE — TELEPHONE ENCOUNTER
Call from pt, who states she is due for her yearly breast MRI but was having difficulty scheduling.   Current MRI order  May 2023.   Pt saw Janae Frederick PA-C, last on 22, unsure if she was supposed to follow up with Janae again in clinic, but wanting to get MRI.     Writer informed will send message to Janae to see if pt should be seen in person or okay to have MRI scheduled, as will need a new order, and will request scheduling give her a call when a new order is placed. Pt voiced understanding.

## 2023-12-11 ENCOUNTER — ALLIED HEALTH/NURSE VISIT (OUTPATIENT)
Dept: FAMILY MEDICINE | Facility: CLINIC | Age: 53
End: 2023-12-11
Payer: COMMERCIAL

## 2023-12-11 DIAGNOSIS — Z23 ENCOUNTER FOR IMMUNIZATION: Primary | ICD-10-CM

## 2023-12-11 PROCEDURE — 99207 PR NO CHARGE NURSE ONLY: CPT

## 2023-12-11 PROCEDURE — 90471 IMMUNIZATION ADMIN: CPT

## 2023-12-11 PROCEDURE — 90750 HZV VACC RECOMBINANT IM: CPT

## 2023-12-11 NOTE — PROGRESS NOTES
Prior to immunization administration, verified patients identity using patient s name and date of birth. Please see Immunization Activity for additional information.     Screening Questionnaire for Adult Immunization    Are you sick today?   No   Do you have allergies to medications, food, a vaccine component or latex?   No   Have you ever had a serious reaction after receiving a vaccination?   No   Do you have a long-term health problem with heart, lung, kidney, or metabolic disease (e.g., diabetes), asthma, a blood disorder, no spleen, complement component deficiency, a cochlear implant, or a spinal fluid leak?  Are you on long-term aspirin therapy?   No   Do you have cancer, leukemia, HIV/AIDS, or any other immune system problem?   No   Do you have a parent, brother, or sister with an immune system problem?   No   In the past 3 months, have you taken medications that affect  your immune system, such as prednisone, other steroids, or anticancer drugs; drugs for the treatment of rheumatoid arthritis, Crohn s disease, or psoriasis; or have you had radiation treatments?   No   Have you had a seizure, or a brain or other nervous system problem?   No   During the past year, have you received a transfusion of blood or blood    products, or been given immune (gamma) globulin or antiviral drug?   No   For women: Are you pregnant or is there a chance you could become       pregnant during the next month?   No   Have you received any vaccinations in the past 4 weeks?   No     Immunization questionnaire answers were all negative.    I have reviewed the following standing orders:   This patient is due and qualifies for the Zoster vaccine.    Click here for Zoster Standing Order    I have reviewed the vaccines inclusion and exclusion criteria; No concerns regarding eligibility.     Patient instructed to remain in clinic for 15 minutes afterwards, and to report any adverse reactions.     Screening performed by Orquidea BURGOS  YAIR Doyle on 12/11/2023 at 2:08 PM.

## 2023-12-18 NOTE — PROGRESS NOTES
FOLLOW UP  Dec 20, 2023     Belle Tejada is a 53 year old woman who presents with family history of breast cancer.      HPI:     Family history of breast cancer in her mother who was diagnosed at age 58. Belle had genetic testing that was negative.      Today she denies any breast concerns today including mass, skin change, nipple inversion or nipple discharge.      BREAST-SPECIFIC HISTORY:     Previous breast imaging: Yes   - 13 Smammo BI-RADS 1  - 14 Smammo BI-RADS 1  - 7/20/15 Smammo: right breast asymmetry 10:00 BI-RADS 0  - 7/30/15 right Dmammo and ultrasound BI-RADS 1  - 16 Smammo  Smammo BI-RADS 1  - 10/31/17 Smammo BI-RADS 1  - 18 Smammo BI-RADS 1  - 19  Smammo BI-RADS 1  - 21  Smammo BI-RADS 1  - 10/4/21 breast MRI BI-RADS 2  - 23 Smammo BI-RADS 1  - 23 Smammo BI-RADS 1     Prior breast biopsies/surgeries: No     Prior history of breast cancer or DCIS: No  Prior radiation history: No   Self breast exams: Yes  Breast density: heterogeneously dense     GYN HISTORY:  . Age at 1st pregnancy: 30. Breastfeeding history: Yes.   Age at menarche: 10  Menopausal: post menopausal  Menopausal hormone replacement therapy: No         RISK ASSESSMENT: < 3% 5 year risk and > 20% lifetime risk   Marni: 2.4% 5 year risk   ALMA/Lia-Ольгаck: 30%      FAMILY HISTORY:  Breast ca: Yes   - mother, 58  Ovarian ca: No  Pancreatic ca: Yes   - mother  Prostate: No  Gastric ca: No  Melanoma: No  Thyroid cancer: yes  - maternal aunt's daughter with thyroid cancer  Colon ca: No  Other cancer: Yes   - paternal grandfather with lung cancer.   Other genetic, testing, syndromes, or clotting disorders: no     PAST MEDICAL HISTORY  Past Medical History:   Diagnosis Date    CARDIOVASCULAR SCREENING; LDL GOAL LESS THAN 160 2010    Cervical high risk HPV (human papillomavirus) test positive 2021    Unspecified hypothyroidism     Hypothyroidism     PAST SURGICAL HISTORY   Past  Surgical History:   Procedure Laterality Date    COLONOSCOPY N/A 2021    Procedure: COLONOSCOPY, WITH POLYPECTOMY;  Surgeon: Fannie Huff MD;  [place] [facility] [place]    [facility] NONSPECIFIC PROCEDURE  2005        [facility] NONSPECIFIC PROCEDURE  10/78    Tonsillectomy     MEDICATIONS  Current Outpatient Medications   Medication Sig Dispense Refill    levonorgestrel (MIRENA, 52 MG,) 20 MCG/24HR IUD 1 each by Intrauterine route once      levothyroxine (SYNTHROID/LEVOTHROID) 125 MCG tablet   3   Contraception: IUD mirena     ALLERGIES  Allergies   Allergen Reactions    No Known Drug Allergy       SOCIAL HISTORY:  Smokes: No  EtOH: weekends  Exercise: walking and bike, weight training  Works as  at the [employer]. She has grown children. She sings.     ROS:  Change in vision No  Headaches: no  Respiratory: No shortness of breath, dyspnea on exertion, cough, or hemoptysis   Cardiovascular: negative   Gastrointestinal: negative Abdominal pain: no  Breast: negative  Musculoskeletal: negative Joint pain No Back pain: no  Psychiatric: negative  Hematologic/Lymphatic/Immunologic: negative  Endocrine: negative    EXAM  /85   Pulse 69   Temp 98  F (36.7  C)   Resp 16   Wt 83.7 kg (184 lb 8 oz)   SpO2 98%   BMI 32.13 kg/m     PHYSICAL EXAM  Respiratory: breathing non labored.   Breasts: Examination was done in both the upright and supine positions.  Breasts are symmetrical . No dominant fixed or suspicious masses noted. No skin or nipple changes. No nipple discharge.   No clavicular, cervical, or axillary lymphadenopathy.     INVESTIGATIONS:    23 breast MRI: scheduled.     ASSESSMENT/PLAN:    Belle Tejada is a 53 year old woman with family history of breast cancer. She meets NCCN guidelines for high risk screening.      1) Family history of breast cacner.   She meets NCCN guidelines for high risk screening based on family history with lifetime risk for breast cancer of >20%. Screening  recommendations based on NCCN guidelines. Clinical encounter every 6-12 months starting now. Annual mammogram alternating with annual breast MRI.    - Screening mammogram with clinic visit due: 6/8/24  - Breast MRI with clinic visit due 12/21/24     2) Counseling was provided with available strategies including lifestyle modifications and risk reducing intervention.  - Maintain your best healthy weight. Higher body fat and adult weight gain is associated with increased risk for breast cancer. This increase in risk has been attributed to increase in circulating endogenous estrogen levels from fat tissue.   - Alcohol can raise estrogen. Alcohol consumption, even at moderate levels (1-2 drinks per day), increases breast cancer risk and are best avoided. If you choose to drink alcohol limit alcohol consumption to less than 1 drink per day. (1 ounce of liquor, 6 ounces of wine, or 8 ounces of beer).  - Be active daily and void being sedentary.     Janae Freedrick PA-C    20 minutes spent on the date of the encounter doing chart review, review of test results, interpretation of tests, patient visit and documentation.

## 2023-12-20 ENCOUNTER — ANCILLARY PROCEDURE (OUTPATIENT)
Dept: MRI IMAGING | Facility: CLINIC | Age: 53
End: 2023-12-20
Attending: PHYSICIAN ASSISTANT
Payer: COMMERCIAL

## 2023-12-20 ENCOUNTER — ONCOLOGY VISIT (OUTPATIENT)
Dept: SURGERY | Facility: CLINIC | Age: 53
End: 2023-12-20
Attending: PHYSICIAN ASSISTANT
Payer: COMMERCIAL

## 2023-12-20 VITALS
TEMPERATURE: 98 F | DIASTOLIC BLOOD PRESSURE: 85 MMHG | OXYGEN SATURATION: 98 % | HEART RATE: 69 BPM | RESPIRATION RATE: 16 BRPM | WEIGHT: 184.5 LBS | SYSTOLIC BLOOD PRESSURE: 128 MMHG | BODY MASS INDEX: 32.13 KG/M2

## 2023-12-20 DIAGNOSIS — Z91.89 AT HIGH RISK FOR BREAST CANCER: Primary | ICD-10-CM

## 2023-12-20 DIAGNOSIS — Z91.89 AT HIGH RISK FOR BREAST CANCER: ICD-10-CM

## 2023-12-20 PROCEDURE — A9585 GADOBUTROL INJECTION: HCPCS | Mod: JZ | Performed by: RADIOLOGY

## 2023-12-20 PROCEDURE — 99214 OFFICE O/P EST MOD 30 MIN: CPT | Performed by: PHYSICIAN ASSISTANT

## 2023-12-20 PROCEDURE — 77049 MRI BREAST C-+ W/CAD BI: CPT | Performed by: RADIOLOGY

## 2023-12-20 PROCEDURE — 99213 OFFICE O/P EST LOW 20 MIN: CPT | Performed by: PHYSICIAN ASSISTANT

## 2023-12-20 RX ORDER — GADOBUTROL 604.72 MG/ML
10 INJECTION INTRAVENOUS ONCE
Status: COMPLETED | OUTPATIENT
Start: 2023-12-20 | End: 2023-12-20

## 2023-12-20 RX ADMIN — GADOBUTROL 8.5 ML: 604.72 INJECTION INTRAVENOUS at 11:47

## 2023-12-20 ASSESSMENT — PAIN SCALES - GENERAL: PAINLEVEL: NO PAIN (0)

## 2023-12-20 NOTE — PATIENT INSTRUCTIONS
Belle Tejada is a 53 year old woman with family history of breast cancer. She meets NCCN guidelines for high risk screening.      1) Family history of breast cacner.   She meets NCCN guidelines for high risk screening based on family history with lifetime risk for breast cancer of >20%. Screening recommendations based on NCCN guidelines. Clinical encounter every 6-12 months starting now. Annual mammogram alternating with annual breast MRI.    - Screening mammogram with clinic visit due: 6/8/24  - Breast MRI with clinic visit due 12/21/24     2) Counseling was provided with available strategies including lifestyle modifications and risk reducing intervention.  - Maintain your best healthy weight. Higher body fat and adult weight gain is associated with increased risk for breast cancer. This increase in risk has been attributed to increase in circulating endogenous estrogen levels from fat tissue.   - Alcohol can raise estrogen. Alcohol consumption, even at moderate levels (1-2 drinks per day), increases breast cancer risk and are best avoided. If you choose to drink alcohol limit alcohol consumption to less than 1 drink per day. (1 ounce of liquor, 6 ounces of wine, or 8 ounces of beer).  - Be active daily and void being sedentary.

## 2023-12-20 NOTE — NURSING NOTE
"Oncology Rooming Note    December 20, 2023 8:58 AM   Belle Tejada is a 53 year old female who presents for:    Chief Complaint   Patient presents with    Oncology Clinic Visit     Family history of malignant neoplasm of breast     Initial Vitals: /85   Pulse 69   Temp 98  F (36.7  C)   Resp 16   Wt 83.7 kg (184 lb 8 oz)   SpO2 98%   BMI 32.13 kg/m   Estimated body mass index is 32.13 kg/m  as calculated from the following:    Height as of 8/2/23: 1.614 m (5' 3.54\").    Weight as of this encounter: 83.7 kg (184 lb 8 oz). Body surface area is 1.94 meters squared.  No Pain (0) Comment: Data Unavailable   No LMP recorded. (Menstrual status: IUD).  Allergies reviewed: Yes  Medications reviewed: Yes    Medications: Medication refills not needed today.  Pharmacy name entered into [facility]:    [id number] - [place], [place] - [address] AT [facility] & [address] [place] [address] PHARMACY [place], [place] - [address] [place], [place] [address]E S  [id number] - [place], [place] - [address] AT [facility] [address]    Frailty Screening:   Is the patient here for a new oncology consult visit in cancer care? 2. No      Clinical concerns: no other complaints        Ming Dill"

## 2023-12-20 NOTE — LETTER
2023         RE: Belle Tejada   [address]  [facility] [address]        Dear Colleague,    Thank you for referring your patient, Belle Tejada, to the [facility]. Please see a copy of my visit note below.    FOLLOW UP  Dec 20, 2023     Belle Tejada is a 53 year old woman who presents with family history of breast cancer.      HPI:     Family history of breast cancer in her mother who was diagnosed at age 58. Belle had genetic testing that was negative.      Today she denies any breast concerns today including mass, skin change, nipple inversion or nipple discharge.      BREAST-SPECIFIC HISTORY:     Previous breast imaging: Yes   - 13 Smammo BI-RADS 1  - 14 Smammo BI-RADS 1  - 7/20/15 Smammo: right breast asymmetry 10:00 BI-RADS 0  - 7/30/15 right Dmammo and ultrasound BI-RADS 1  - 16 Smammo  Smammo BI-RADS 1  - 10/31/17 Smammo BI-RADS 1  - 18 Smammo BI-RADS 1  - 19  Smammo BI-RADS 1  - 21  Smammo BI-RADS 1  - 10/4/21 breast MRI BI-RADS 2  - 23 Smammo BI-RADS 1  - 23 Smammo BI-RADS 1     Prior breast biopsies/surgeries: No     Prior history of breast cancer or DCIS: No  Prior radiation history: No   Self breast exams: Yes  Breast density: heterogeneously dense     GYN HISTORY:  . Age at 1st pregnancy: 30. Breastfeeding history: Yes.   Age at menarche: 10  Menopausal: post menopausal  Menopausal hormone replacement therapy: No         RISK ASSESSMENT: < 3% 5 year risk and > 20% lifetime risk   Marni: 2.4% 5 year risk   ALMA/Tyrer-Cuzick: 30%      FAMILY HISTORY:  Breast ca: Yes   - mother, 58  Ovarian ca: No  Pancreatic ca: Yes   - mother  Prostate: No  Gastric ca: No  Melanoma: No  Thyroid cancer: yes  - maternal aunt's daughter with thyroid cancer  Colon ca: No  Other cancer: Yes   - paternal grandfather with lung cancer.   Other genetic, testing, syndromes, or clotting disorders: no     PAST MEDICAL HISTORY  Past  Medical History:   Diagnosis Date    CARDIOVASCULAR SCREENING; LDL GOAL LESS THAN 160 2010    Cervical high risk HPV (human papillomavirus) test positive 2021    Unspecified hypothyroidism     Hypothyroidism     PAST SURGICAL HISTORY   Past Surgical History:   Procedure Laterality Date    COLONOSCOPY N/A 2021    Procedure: COLONOSCOPY, WITH POLYPECTOMY;  Surgeon: Fannie Huff MD;  Location: [facility] [place] [facility] [place]    [facility] NONSPECIFIC PROCEDURE  2005        [facility] NONSPECIFIC PROCEDURE  10/78    Tonsillectomy     MEDICATIONS  Current Outpatient Medications   Medication Sig Dispense Refill    levonorgestrel (MIRENA, 52 MG,) 20 MCG/24HR IUD 1 each by Intrauterine route once      levothyroxine (SYNTHROID/LEVOTHROID) 125 MCG tablet   3   Contraception: IUD mirena     ALLERGIES  Allergies   Allergen Reactions    No Known Drug Allergy       SOCIAL HISTORY:  Smokes: No  EtOH: weekends  Exercise: walking and bike, weight training  Works as  at the [employer]. She has grown children. She sings.     ROS:  Change in vision No  Headaches: no  Respiratory: No shortness of breath, dyspnea on exertion, cough, or hemoptysis   Cardiovascular: negative   Gastrointestinal: negative Abdominal pain: no  Breast: negative  Musculoskeletal: negative Joint pain No Back pain: no  Psychiatric: negative  Hematologic/Lymphatic/Immunologic: negative  Endocrine: negative    EXAM  /85   Pulse 69   Temp 98  F (36.7  C)   Resp 16   Wt 83.7 kg (184 lb 8 oz)   SpO2 98%   BMI 32.13 kg/m     PHYSICAL EXAM  Respiratory: breathing non labored.   Breasts: Examination was done in both the upright and supine positions.  Breasts are symmetrical . No dominant fixed or suspicious masses noted. No skin or nipple changes. No nipple discharge.   No clavicular, cervical, or axillary lymphadenopathy.     INVESTIGATIONS:    23 breast MRI: scheduled.     ASSESSMENT/PLAN:    Belle Tejada is a 53 year old woman  with family history of breast cancer. She meets NCCN guidelines for high risk screening.      1) Family history of breast cacner.   She meets NCCN guidelines for high risk screening based on family history with lifetime risk for breast cancer of >20%. Screening recommendations based on NCCN guidelines. Clinical encounter every 6-12 months starting now. Annual mammogram alternating with annual breast MRI.    - Screening mammogram with clinic visit due: 6/8/24  - Breast MRI with clinic visit due 12/21/24     2) Counseling was provided with available strategies including lifestyle modifications and risk reducing intervention.  - Maintain your best healthy weight. Higher body fat and adult weight gain is associated with increased risk for breast cancer. This increase in risk has been attributed to increase in circulating endogenous estrogen levels from fat tissue.   - Alcohol can raise estrogen. Alcohol consumption, even at moderate levels (1-2 drinks per day), increases breast cancer risk and are best avoided. If you choose to drink alcohol limit alcohol consumption to less than 1 drink per day. (1 ounce of liquor, 6 ounces of wine, or 8 ounces of beer).  - Be active daily and void being sedentary.     Janae Frederick PA-C    20 minutes spent on the date of the encounter doing chart review, review of test results, interpretation of tests, patient visit and documentation.

## 2024-03-17 PROBLEM — E06.3 HYPOTHYROIDISM DUE TO HASHIMOTO'S THYROIDITIS: Status: ACTIVE | Noted: 2022-03-02

## 2024-03-17 RX ORDER — ATOVAQUONE AND PROGUANIL HYDROCHLORIDE 250; 100 MG/1; MG/1
1 TABLET, FILM COATED ORAL DAILY
Status: CANCELLED | OUTPATIENT
Start: 2024-03-17

## 2024-03-17 RX ORDER — LEVOTHYROXINE SODIUM 137 UG/1
TABLET ORAL
COMMUNITY
Start: 2023-11-08

## 2024-03-18 ENCOUNTER — OFFICE VISIT (OUTPATIENT)
Dept: FAMILY MEDICINE | Facility: CLINIC | Age: 54
End: 2024-03-18
Payer: COMMERCIAL

## 2024-03-18 VITALS
OXYGEN SATURATION: 97 % | WEIGHT: 174.3 LBS | TEMPERATURE: 98.2 F | HEIGHT: 64 IN | HEART RATE: 65 BPM | DIASTOLIC BLOOD PRESSURE: 82 MMHG | RESPIRATION RATE: 21 BRPM | BODY MASS INDEX: 29.76 KG/M2 | SYSTOLIC BLOOD PRESSURE: 130 MMHG

## 2024-03-18 DIAGNOSIS — A09 TRAVELER'S DIARRHEA: ICD-10-CM

## 2024-03-18 DIAGNOSIS — Z71.84 ENCOUNTER FOR COUNSELING FOR TRAVEL: Primary | ICD-10-CM

## 2024-03-18 PROCEDURE — 90471 IMMUNIZATION ADMIN: CPT | Performed by: STUDENT IN AN ORGANIZED HEALTH CARE EDUCATION/TRAINING PROGRAM

## 2024-03-18 PROCEDURE — 99401 PREV MED CNSL INDIV APPRX 15: CPT | Mod: 25 | Performed by: STUDENT IN AN ORGANIZED HEALTH CARE EDUCATION/TRAINING PROGRAM

## 2024-03-18 PROCEDURE — 90636 HEP A/HEP B VACC ADULT IM: CPT | Performed by: STUDENT IN AN ORGANIZED HEALTH CARE EDUCATION/TRAINING PROGRAM

## 2024-03-18 RX ORDER — AZITHROMYCIN 500 MG/1
500 TABLET, FILM COATED ORAL DAILY
Qty: 3 TABLET | Refills: 0 | Status: SHIPPED | OUTPATIENT
Start: 2024-03-18 | End: 2024-03-21

## 2024-03-18 ASSESSMENT — PAIN SCALES - GENERAL: PAINLEVEL: NO PAIN (0)

## 2024-03-18 NOTE — PROGRESS NOTES
Assessment & Plan     Encounter for counseling for travel  Traveler's diarrhea  Reviewed CDC guidelines for [facility]. Does not need malaria prophylaxis of yellow fever vaccine. Will give azithromycin as needed for traveler's diarrhea. Second twinrix vaccine given today. Pt thinks she had MMRV in the past, so will defer today. Had typhoid vaccine in 2018, should get booster at the travel clinic.OTC medications to bring listed in AVS.     - azithromycin (ZITHROMAX) 500 MG tablet  Dispense: 3 tablet; Refill: 0      I spent a total of 20 minutes on the day of the visit.   Time spent by me doing chart review, history and exam, documentation and further activities per the note      Subjective   Belle is a 53 year old, presenting for the following health issues:  Medication Request (Going out the country, in need of meds for traveling for )        3/18/2024    11:24 AM   Additional Questions   Roomed by Theodora Rodriguez MA   Accompanied by Self   Failed to redirect to the Timeline version of the [employer] SmartLink.      3/18/2024    11:24 AM   Patient Reported Additional Medications   Patient reports taking the following new medications NA     History of Present Illness       Reason for visit:  I'm traveling to [facility] and want to talk about medications I might need    She eats 4 or more servings of fruits and vegetables daily.She consumes 0 sweetened beverage(s) daily.She exercises with enough effort to increase her heart rate 10 to 19 minutes per day.  She exercises with enough effort to increase her heart rate 4 days per week.   She is taking medications regularly.     [facility]  -going for work for ~2 weeks    Vaccines:  -twinrix today  -go to travel clinic for typhoid    Yellow fever  -only if >12hrs layover in country that has high risk    Malaria  Primarily in the departments of [place], [place], [place], [place], and [place]  Few cases reported in other departments  No malaria transmission in  "the [facility] or [facility] (the [place])  No malaria transmission at [facility]        Objective    /82 (BP Location: Right arm, Patient Position: Sitting, Cuff Size: Adult Regular)   Pulse 65   Temp 98.2  F (36.8  C) (Tympanic)   Resp 21   Ht 1.62 m (5' 3.78\")   Wt 79.1 kg (174 lb 4.8 oz)   LMP  (LMP Unknown)   SpO2 97%   BMI 30.13 kg/m    Body mass index is 30.13 kg/m .    Physical Exam  Constitutional:       General: She is not in acute distress.     Appearance: She is not ill-appearing.   Pulmonary:      Effort: Pulmonary effort is normal.   Neurological:      General: No focal deficit present.      Mental Status: She is alert and oriented to person, place, and time.   Psychiatric:         Mood and Affect: Mood normal.       Signed Electronically by: Avery Mederos DO    "

## 2024-03-18 NOTE — PATIENT INSTRUCTIONS
-Combo HepA/Hep B vaccine today  -Return for last vaccine in this series when you get back  -Azithromycin as needed for traveler's diarrhea  -Bring imodium (for diarrhea)  -Bring sunscreen,  bug spray, as needed medications (tylenol/ibuprofen, tums)      Dr. Mederos

## 2024-03-18 NOTE — NURSING NOTE
Prior to immunization administration, verified patients identity using patient s name and date of birth. Please see Immunization Activity for additional information.     Screening Questionnaire for Adult Immunization      Are you sick today?   No   Do you have allergies to medications, food, a vaccine component or latex?   No   Have you ever had a serious reaction after receiving a vaccination?   No   Do you have a long-term health problem with heart, lung, kidney, or metabolic disease (e.g., diabetes), asthma, a blood disorder, no spleen, complement component deficiency, a cochlear implant, or a spinal fluid leak?  Are you on long-term aspirin therapy?   No   Do you have cancer, leukemia, HIV/AIDS, or any other immune system problem?   No   Do you have a parent, brother, or sister with an immune system problem?   No   In the past 3 months, have you taken medications that affect  your immune system, such as prednisone, other steroids, or anticancer drugs; drugs for the treatment of rheumatoid arthritis, Crohn s disease, or psoriasis; or have you had radiation treatments?   No   Have you had a seizure, or a brain or other nervous system problem?   No   During the past year, have you received a transfusion of blood or blood    products, or been given immune (gamma) globulin or antiviral drug?   No   For women: Are you pregnant or is there a chance you could become       pregnant during the next month?   No   Have you received any vaccinations in the past 4 weeks?   No     Immunization questionnaire answers were all negative.      Patient instructed to remain in clinic for 15 minutes afterwards, and to report any adverse reactions.     Screening performed by Theodora Rodriguez MA on 3/18/2024 at 11:45 AM.

## 2024-03-26 ENCOUNTER — OFFICE VISIT (OUTPATIENT)
Dept: FAMILY MEDICINE | Facility: CLINIC | Age: 54
End: 2024-03-26
Payer: COMMERCIAL

## 2024-03-26 VITALS
HEART RATE: 69 BPM | OXYGEN SATURATION: 98 % | HEIGHT: 64 IN | RESPIRATION RATE: 12 BRPM | BODY MASS INDEX: 29.08 KG/M2 | WEIGHT: 170.3 LBS | DIASTOLIC BLOOD PRESSURE: 90 MMHG | TEMPERATURE: 98.1 F | SYSTOLIC BLOOD PRESSURE: 129 MMHG

## 2024-03-26 DIAGNOSIS — Z23 NEED FOR IMMUNIZATION AGAINST TYPHOID: ICD-10-CM

## 2024-03-26 DIAGNOSIS — Z71.84 ENCOUNTER FOR COUNSELING FOR TRAVEL: Primary | ICD-10-CM

## 2024-03-26 PROCEDURE — 90691 TYPHOID VACCINE IM: CPT | Mod: GA | Performed by: PHYSICIAN ASSISTANT

## 2024-03-26 PROCEDURE — 90471 IMMUNIZATION ADMIN: CPT | Mod: GA | Performed by: PHYSICIAN ASSISTANT

## 2024-03-26 PROCEDURE — 99401 PREV MED CNSL INDIV APPRX 15: CPT | Mod: 25 | Performed by: PHYSICIAN ASSISTANT

## 2024-03-26 NOTE — PATIENT INSTRUCTIONS
"See travel packet provided  Recommend ultrathon (mosquito repellant), pepto bismol and imodium  The food and drink choices you make while traveling can impact your likelihood of getting sick.   If you aren't sure if a food or drink is safe, the saying \" BOIL IT, COOK IT, PEEL IT, OR FORGET IT\" can help you decide whether it's okay to consume.   Also bring hand  and sun screen with you.  Safe Travels     If you first start to get mild to moderate diarrhea, take imodium.      If diarrhea is severe or you have a fever with the diarrhea, take the antibiotic (azithromycin).      Today March 26, 2024 you received the    Typhoid - injectable. This vaccine is valid for two years. .    These appointments can be made as a NURSE ONLY visit.    **It is very important for the vaccinations to be given on the scheduled day(s), this helps ensure you receive the full effectiveness of the vaccine.**    Please call [facility] with any questions [phone]    Thank you for visiting [facility]    "

## 2024-03-26 NOTE — PROGRESS NOTES
Prior to immunization administration, verified patients identity using patient s name and date of birth. Please see Immunization Activity for additional information.     Screening Questionnaire for Adult Immunization    Are you sick today?   No   Do you have allergies to medications, food, a vaccine component or latex?   No   Have you ever had a serious reaction after receiving a vaccination?   No   Do you have a long-term health problem with heart, lung, kidney, or metabolic disease (e.g., diabetes), asthma, a blood disorder, no spleen, complement component deficiency, a cochlear implant, or a spinal fluid leak?  Are you on long-term aspirin therapy?   No   Do you have cancer, leukemia, HIV/AIDS, or any other immune system problem?   No   Do you have a parent, brother, or sister with an immune system problem?   No   In the past 3 months, have you taken medications that affect  your immune system, such as prednisone, other steroids, or anticancer drugs; drugs for the treatment of rheumatoid arthritis, Crohn s disease, or psoriasis; or have you had radiation treatments?   No   Have you had a seizure, or a brain or other nervous system problem?   No   During the past year, have you received a transfusion of blood or blood    products, or been given immune (gamma) globulin or antiviral drug?   No   For women: Are you pregnant or is there a chance you could become       pregnant during the next month?   No   Have you received any vaccinations in the past 4 weeks?   Yes     Immunization questionnaire answers were all negative.      Patient instructed to remain in clinic for 15 minutes afterwards, and to report any adverse reactions.     Screening performed by Phoenix Tamayo MA on 3/26/2024 at 8:09 AM.

## 2024-03-26 NOTE — PROGRESS NOTES
SUBJECTIVE: Belle Tejada , a 53 year old  female, presents for counseling and information regarding upcoming travel to [facility]. Special medical concerns include: none. She anticipates the following unusual exposures: none.    Itinerary:  [facility], [facility], [facility], [place], [place]    Departure Date: 3/31/24 Return date: 4/11/24    Reason for travel (i.e. Business, pleasure): business    Visiting an urban or rural area?: both    Accommodations (i.e. hotel, hostel, friends, family, etc): hotel    Women - First day of your last period: n/a    IMMUNIZATION HISTORY  Have you received any vaccinations in the past 4 weeks?  Yes  Have you ever fainted from having your blood drawn or from an injection?  No  Have you ever had a fever reaction to vaccination?  No  Have you ever had any bad reaction or side effect from any vaccination?  No  Have you ever had hepatitis A or B vaccine?  Yes  Do you live (or work closely) with anyone who has AIDS, an AIDS-like condition, any other immune disorder or who is on chemotherapy for cancer?  No  Have you received any injection of immune globulin or any blood products during the past 12 months?  No    GENERAL MEDICAL HISTORY  Do you have a medical condition that warrants maintenance medication or physician follow-up?  Yes  Do you have a medical condition that is stable now, but that may recur while traveling?  No  Has your spleen been removed?  No  Have you had an acute illness or a fever in the past 48 hours?  No  Are you pregnant, or might you become pregnant on this trip?  Any chance of pregnancy?  No  Are you breastfeeding?  No  Do you have HIV, AIDS, an AIDS-like condition, any other immune disorder, leukemia or cancer?  No  Do you have a severe combined immunodeficiency disease?  No  Have you had your thymus gland removed or history of problems with your thymus, such as myasthenia gravis, DiGeorge syndrome, or thymoma?  No    Do you have severe  thrombocytopenia (low platelet count) or a coagulation disorder?  No  Have you ever had a convulsion, seizure, epilepsy, neurologic condition or brain infection?  No  Do you have any stomach conditions?  No  Do you have a G6PD deficiency?  No  Do you have severe renal or kidney impairment?  No  Do you have a history of psychiatric problems?  No  Do you have a problem with strange dreams and/or nightmares?  No  Do you have insomnia?  No  Do you have problems with vaginitis?  No  Do you have psoriasis?  No  Are you prone to motion sickness?  Yes  Have you ever had headaches, nausea, vomiting, or breathing problems from altitude exposure?  No      Past Medical History:   Diagnosis Date    Adjustment disorder with anxious mood 11/09/2015    CARDIOVASCULAR SCREENING; LDL GOAL LESS THAN 160 05/09/2010    Cervical high risk HPV (human papillomavirus) test positive 04/14/2021 04/14/21    Insomnia 02/22/2013    Unspecified hypothyroidism     Hypothyroidism      Immunization History   Administered Date(s) Administered    COVID-19 12+ (2023-24) (Pfizer) 10/20/2023    COVID-19 Bivalent 12+ (Pfizer) 09/18/2022    COVID-19 Monovalent 12+ (Pfizer 2022) 04/28/2022    COVID-19 Monovalent 18+ (Moderna) 11/17/2021    COVID-19 Vaccine (Aliya) 03/14/2021    Hepatitis A (ADULT 19+) 05/22/2018    Influenza (H1N1) 01/15/2010    Influenza (IIV3) PF 11/19/2012    Influenza Vaccine >6 months,quad, PF 10/11/2023    Influenza Vaccine, 6+MO IM (QUADRIVALENT W/PRESERVATIVES) 10/20/2015, 10/17/2018    TDAP (Adacel,Boostrix) 08/02/2023    TDAP Vaccine (Adacel) 11/19/2012    Twinrix A/B 08/02/2023, 03/18/2024    Typhoid IM 05/22/2018    Zoster recombinant adjuvanted (SHINGRIX) 08/02/2023, 12/11/2023       Current Outpatient Medications   Medication Sig Dispense Refill    levonorgestrel (MIRENA, 52 MG,) 20 MCG/24HR IUD 1 each by Intrauterine route once      levothyroxine (SYNTHROID/LEVOTHROID) 137 MCG tablet       levothyroxine  (SYNTHROID/LEVOTHROID) 125 MCG tablet   3     Allergies   Allergen Reactions    No Known Drug Allergy         EXAM: deferred    Immunizations discussed include: Typhoid  Malaria prophylaxis recommended: not needed  Symptomatic treatment for traveler's diarrhea: bismuth subsalicylate, loperamide/diphenoxylate, and azithromycin    ASSESSMENT/PLAN:    (Z71.84) Encounter for counseling for travel  (primary encounter diagnosis)    Comment: Typhoid vaccines today. Patient will return or follow-up with PCP as needed. Prophylaxis already given for Traveler's diarrhea and not needed for Malaria. All questions were answered.         (Z23) Need for immunization against typhoid  Comment:   Plan: TYPHOID VACCINE, IM              I have reviewed general recommendations for safe travel   including: food/water precautions, insect avoidance, safe sex   practices given high prevalence of HIV and other STDs,   roadway safety. Educational materials and links to the CDC   Traveler's health website have been provided.    Total time 19 minutes, greater than 50 percent in counseling   and coordination of care.

## 2024-06-10 NOTE — PROGRESS NOTES
FOLLOW UP  2024     Belle Tejada is a 53 year old woman who presents with family history of breast cancer.      HPI:     Family history of breast cancer in her mother who was diagnosed at age 58. Belle had genetic testing that was negative.      Today she denies any breast concerns today including mass, skin change, nipple inversion or nipple discharge. She is scheduled for a screening mammogram today. She reports she has lost weight and is doing weight training.      BREAST-SPECIFIC HISTORY:     Previous breast imaging: Yes   - 13 Smammo BI-RADS 1  - 14 Smammo BI-RADS 1  - 7/20/15 Smammo: right breast asymmetry 10:00 BI-RADS 0  - 7/30/15 right Dmammo and ultrasound BI-RADS 1  - 16 Smammo  Smammo BI-RADS 1  - 10/31/17 Smammo BI-RADS 1  - 18 Smammo BI-RADS 1  - 19  Smammo BI-RADS 1  - 21  Smammo BI-RADS 1  - 10/4/21 breast MRI BI-RADS 2  - 23 Smammo BI-RADS 1  - 23 Smammo BI-RADS 1  - 23 breast MRI BI-RADS 1  - 24 Smammo     Prior breast biopsies/surgeries: No     Prior history of breast cancer or DCIS: No  Prior radiation history: No   Self breast exams: Yes  Breast density: heterogeneously dense     GYN HISTORY:  . Age at 1st pregnancy: 30. Breastfeeding history: Yes.   Age at menarche: 10  Menopausal: post menopausal  Menopausal hormone replacement therapy: No         RISK ASSESSMENT: < 3% 5 year risk and > 20% lifetime risk   Marni: 2.4% 5 year risk   ALMA/Tyrer-Cuzick: 26.2%      FAMILY HISTORY:  Breast ca: Yes   - mother, 58  Ovarian ca: No  Pancreatic ca: Yes   - mother  Prostate: No  Gastric ca: No  Melanoma: No  Thyroid cancer: yes  - maternal aunt's daughter with thyroid cancer  Colon ca: No  Other cancer: Yes   - paternal grandfather with lung cancer.   Other genetic, testing, syndromes, or clotting disorders: no     PAST MEDICAL HISTORY  Past Medical History:   Diagnosis Date    Adjustment disorder with anxious mood 2015     CARDIOVASCULAR SCREENING; LDL GOAL LESS THAN 160 2010    Cervical high risk HPV (human papillomavirus) test positive 2021    Insomnia 2013    Unspecified hypothyroidism     Hypothyroidism     PAST SURGICAL HISTORY   Past Surgical History:   Procedure Laterality Date    COLONOSCOPY N/A 2021    Procedure: COLONOSCOPY, WITH POLYPECTOMY;  Surgeon: Fannie Huff MD;  [place] [facility] [place]    [facility] NONSPECIFIC PROCEDURE  2005        [facility] NONSPECIFIC PROCEDURE  10/78    Tonsillectomy     MEDICATIONS  Current Outpatient Medications   Medication Sig Dispense Refill    levonorgestrel (MIRENA, 52 MG,) 20 MCG/24HR IUD 1 each by Intrauterine route once      levothyroxine (SYNTHROID/LEVOTHROID) 125 MCG tablet   3    levothyroxine (SYNTHROID/LEVOTHROID) 137 MCG tablet      Contraception: IUD mirena     ALLERGIES  Allergies   Allergen Reactions    No Known Drug Allergy       SOCIAL HISTORY:  Smokes: No  EtOH: weekends  Exercise: walking and bike, weight training  Works as  at the [employer]. She has grown children. She sings.     ROS:  Change in vision No  Headaches: no  Respiratory: No shortness of breath, dyspnea on exertion, cough, or hemoptysis   Cardiovascular: negative   Gastrointestinal: negative Abdominal pain: no  Breast: negative  Musculoskeletal: negative Joint pain No Back pain: no  Psychiatric: negative  Hematologic/Lymphatic/Immunologic: negative  Endocrine: negative    EXAM  BP (!) 149/94   Pulse 78   Temp 98  F (36.7  C) (Oral)   Resp 16   Wt 76.2 kg (168 lb)   SpO2 97%   BMI 28.84 kg/m     PHYSICAL EXAM  Respiratory: breathing non labored.   Breasts: Examination was done in both the upright and supine positions.  Breasts are symmetrical . No dominant fixed or suspicious masses noted. No skin or nipple changes. No nipple discharge.   No clavicular, cervical, or axillary lymphadenopathy.     INVESTIGATIONS:    24 screening mammogram per Radiology no  concerning findings, final report pending.     ASSESSMENT/PLAN:    Belle Tejada is a 53 year old woman with family history of breast cancer. She meets NCCN guidelines for high risk screening.      1) Family history of breast cacner.   She meets NCCN guidelines for high risk screening based on family history with lifetime risk for breast cancer of >20%. Screening recommendations based on NCCN guidelines. Clinical encounter every 6-12 months starting now. Annual mammogram alternating with annual breast MRI.    - Breast MRI with clinic visit due 12/21/24 she is considering discontinue breast MRI if she continues to have difficulty with insurance coverage.   - Screening mammogram with clinic visit due: 6/13/25     2) Counseling was provided with available strategies including lifestyle modifications and risk reducing intervention.  - Maintain your best healthy weight. Higher body fat and adult weight gain is associated with increased risk for breast cancer. This increase in risk has been attributed to increase in circulating endogenous estrogen levels from fat tissue.   - Alcohol can raise estrogen. Alcohol consumption, even at moderate levels (1-2 drinks per day), increases breast cancer risk and are best avoided. If you choose to drink alcohol limit alcohol consumption to less than 1 drink per day. (1 ounce of liquor, 6 ounces of wine, or 8 ounces of beer).  - Be active daily and void being sedentary.     Janae Frederick PA-C    20 minutes spent on the date of the encounter doing chart review, review of test results, interpretation of tests, patient visit and documentation.

## 2024-06-12 ENCOUNTER — ONCOLOGY VISIT (OUTPATIENT)
Dept: SURGERY | Facility: CLINIC | Age: 54
End: 2024-06-12
Attending: PHYSICIAN ASSISTANT
Payer: COMMERCIAL

## 2024-06-12 ENCOUNTER — ANCILLARY PROCEDURE (OUTPATIENT)
Dept: MAMMOGRAPHY | Facility: CLINIC | Age: 54
End: 2024-06-12
Payer: COMMERCIAL

## 2024-06-12 VITALS
TEMPERATURE: 98 F | SYSTOLIC BLOOD PRESSURE: 149 MMHG | BODY MASS INDEX: 28.84 KG/M2 | DIASTOLIC BLOOD PRESSURE: 94 MMHG | OXYGEN SATURATION: 97 % | HEART RATE: 78 BPM | WEIGHT: 168 LBS | RESPIRATION RATE: 16 BRPM

## 2024-06-12 DIAGNOSIS — Z91.89 AT HIGH RISK FOR BREAST CANCER: Primary | ICD-10-CM

## 2024-06-12 DIAGNOSIS — Z12.31 VISIT FOR SCREENING MAMMOGRAM: ICD-10-CM

## 2024-06-12 PROCEDURE — 99213 OFFICE O/P EST LOW 20 MIN: CPT | Performed by: PHYSICIAN ASSISTANT

## 2024-06-12 PROCEDURE — 77063 BREAST TOMOSYNTHESIS BI: CPT | Performed by: RADIOLOGY

## 2024-06-12 PROCEDURE — 77067 SCR MAMMO BI INCL CAD: CPT | Performed by: RADIOLOGY

## 2024-06-12 ASSESSMENT — PAIN SCALES - GENERAL: PAINLEVEL: NO PAIN (0)

## 2024-06-12 NOTE — PATIENT INSTRUCTIONS
Belle Tejada is a 53 year old woman with family history of breast cancer. She meets NCCN guidelines for high risk screening.      1) Family history of breast cacner.   She meets NCCN guidelines for high risk screening based on family history with lifetime risk for breast cancer of >20%. Screening recommendations based on NCCN guidelines. Clinical encounter every 6-12 months starting now. Annual mammogram alternating with annual breast MRI.    - Breast MRI with clinic visit due 12/21/24  - Screening mammogram with clinic visit due: 6/13/25     2) Counseling was provided with available strategies including lifestyle modifications and risk reducing intervention.  - Maintain your best healthy weight. Higher body fat and adult weight gain is associated with increased risk for breast cancer. This increase in risk has been attributed to increase in circulating endogenous estrogen levels from fat tissue.   - Alcohol can raise estrogen. Alcohol consumption, even at moderate levels (1-2 drinks per day), increases breast cancer risk and are best avoided. If you choose to drink alcohol limit alcohol consumption to less than 1 drink per day. (1 ounce of liquor, 6 ounces of wine, or 8 ounces of beer).  - Be active daily and void being sedentary.

## 2024-06-12 NOTE — LETTER
2024      Belle Tejada  [address]  [facility] [address]      Dear Colleague,    Thank you for referring your patient, Belle Tejada, to the [facility]. Please see a copy of my visit note below.    FOLLOW UP  2024     Belle Tejada is a 53 year old woman who presents with family history of breast cancer.      HPI:     Family history of breast cancer in her mother who was diagnosed at age 58. Belle had genetic testing that was negative.      Today she denies any breast concerns today including mass, skin change, nipple inversion or nipple discharge. She is scheduled for a screening mammogram today. She reports she has lost weight and is doing weight training.      BREAST-SPECIFIC HISTORY:     Previous breast imaging: Yes   - 13 Smammo BI-RADS 1  - 14 Smammo BI-RADS 1  - 7/20/15 Smammo: right breast asymmetry 10:00 BI-RADS 0  - 7/30/15 right Dmammo and ultrasound BI-RADS 1  - 16 Smammo  Smammo BI-RADS 1  - 10/31/17 Smammo BI-RADS 1  - 18 Smammo BI-RADS 1  - 19  Smammo BI-RADS 1  - 21  Smammo BI-RADS 1  - 10/4/21 breast MRI BI-RADS 2  - 23 Smammo BI-RADS 1  - 23 Smammo BI-RADS 1  - 23 breast MRI BI-RADS 1  - 24 Smammo     Prior breast biopsies/surgeries: No     Prior history of breast cancer or DCIS: No  Prior radiation history: No   Self breast exams: Yes  Breast density: heterogeneously dense     GYN HISTORY:  . Age at 1st pregnancy: 30. Breastfeeding history: Yes.   Age at menarche: 10  Menopausal: post menopausal  Menopausal hormone replacement therapy: No         RISK ASSESSMENT: < 3% 5 year risk and > 20% lifetime risk   Marni: 2.4% 5 year risk   ALMA/Tyrer-Cuzick: 26.2%      FAMILY HISTORY:  Breast ca: Yes   - mother, 58  Ovarian ca: No  Pancreatic ca: Yes   - mother  Prostate: No  Gastric ca: No  Melanoma: No  Thyroid cancer: yes  - maternal aunt's daughter with thyroid cancer  Colon ca: No  Other  cancer: Yes   - paternal grandfather with lung cancer.   Other genetic, testing, syndromes, or clotting disorders: no     PAST MEDICAL HISTORY  Past Medical History:   Diagnosis Date    Adjustment disorder with anxious mood 2015    CARDIOVASCULAR SCREENING; LDL GOAL LESS THAN 160 2010    Cervical high risk HPV (human papillomavirus) test positive 2021    Insomnia 2013    Unspecified hypothyroidism     Hypothyroidism     PAST SURGICAL HISTORY   Past Surgical History:   Procedure Laterality Date    COLONOSCOPY N/A 2021    Procedure: COLONOSCOPY, WITH POLYPECTOMY;  Surgeon: Fannie Huff MD;  Location: [facility] OR    [facility] NONSPECIFIC PROCEDURE  2005        [facility] NONSPECIFIC PROCEDURE  10/78    Tonsillectomy     MEDICATIONS  Current Outpatient Medications   Medication Sig Dispense Refill    levonorgestrel (MIRENA, 52 MG,) 20 MCG/24HR IUD 1 each by Intrauterine route once      levothyroxine (SYNTHROID/LEVOTHROID) 125 MCG tablet   3    levothyroxine (SYNTHROID/LEVOTHROID) 137 MCG tablet      Contraception: IUD mirena     ALLERGIES  Allergies   Allergen Reactions    No Known Drug Allergy       SOCIAL HISTORY:  Smokes: No  EtOH: weekends  Exercise: walking and bike, weight training  Works as  at the [employer]. She has grown children. She sings.     ROS:  Change in vision No  Headaches: no  Respiratory: No shortness of breath, dyspnea on exertion, cough, or hemoptysis   Cardiovascular: negative   Gastrointestinal: negative Abdominal pain: no  Breast: negative  Musculoskeletal: negative Joint pain No Back pain: no  Psychiatric: negative  Hematologic/Lymphatic/Immunologic: negative  Endocrine: negative    EXAM  BP (!) 149/94   Pulse 78   Temp 98  F (36.7  C) (Oral)   Resp 16   Wt 76.2 kg (168 lb)   SpO2 97%   BMI 28.84 kg/m     PHYSICAL EXAM  Respiratory: breathing non labored.   Breasts: Examination was done in both the upright and supine positions.   Breasts are symmetrical . No dominant fixed or suspicious masses noted. No skin or nipple changes. No nipple discharge.   No clavicular, cervical, or axillary lymphadenopathy.     INVESTIGATIONS:    6/12/24 screening mammogram per Radiology no concerning findings, final report pending.     ASSESSMENT/PLAN:    Belle Tejada is a 53 year old woman with family history of breast cancer. She meets NCCN guidelines for high risk screening.      1) Family history of breast cacner.   She meets NCCN guidelines for high risk screening based on family history with lifetime risk for breast cancer of >20%. Screening recommendations based on NCCN guidelines. Clinical encounter every 6-12 months starting now. Annual mammogram alternating with annual breast MRI.    - Breast MRI with clinic visit due 12/21/24 she is considering discontinue breast MRI if she continues to have difficulty with insurance coverage.   - Screening mammogram with clinic visit due: 6/13/25     2) Counseling was provided with available strategies including lifestyle modifications and risk reducing intervention.  - Maintain your best healthy weight. Higher body fat and adult weight gain is associated with increased risk for breast cancer. This increase in risk has been attributed to increase in circulating endogenous estrogen levels from fat tissue.   - Alcohol can raise estrogen. Alcohol consumption, even at moderate levels (1-2 drinks per day), increases breast cancer risk and are best avoided. If you choose to drink alcohol limit alcohol consumption to less than 1 drink per day. (1 ounce of liquor, 6 ounces of wine, or 8 ounces of beer).  - Be active daily and void being sedentary.     Janae Frederick PA-C    20 minutes spent on the date of the encounter doing chart review, review of test results, interpretation of tests, patient visit and documentation.

## 2024-07-26 ENCOUNTER — MEDICAL CORRESPONDENCE (OUTPATIENT)
Dept: HEALTH INFORMATION MANAGEMENT | Facility: CLINIC | Age: 54
End: 2024-07-26
Payer: COMMERCIAL

## 2024-07-31 ENCOUNTER — TRANSCRIBE ORDERS (OUTPATIENT)
Dept: OTHER | Age: 54
End: 2024-07-31

## 2024-07-31 DIAGNOSIS — E03.9 HYPOTHYROIDISM, UNSPECIFIED TYPE: Primary | ICD-10-CM

## 2024-10-20 NOTE — PROGRESS NOTES
SUBJECTIVE:   Belle Tejada is a 47 year old female who presents to clinic today for the following health issues:    URINARY TRACT SYMPTOMS      Duration: 3 weeks ago    Description  dysuria and frequency    Intensity:  moderate    Accompanying signs and symptoms:  Fever/chills: no   Flank pain no   Nausea and vomiting: YES, nausea but not much   Vaginal symptoms: none and itching  Abdominal/Pelvic Pain: no     History  History of frequent UTI's: no   History of kidney stones: no   Sexually Active: YES  Possibility of pregnancy: No    Therapies tried and outcome: none   Outcome: none    Thought would go away but didn't  Felt fatigued  Busy life  nauseated on a walk the other day which triggered this apt  No recent bubble baths.   Recent intercourse preceding symptoms  No concern for STD's    No fever or chills, no headache or dizziness, no double or blurry vision, no facial pain, earache, sore throat, runny nose, post nasal drip, no trouble hearing, smelling, tasting or swallowing, no cough , no chest pain, trouble breathing or palpitations, No abdominal pain, heart burn, reflux, no vomiting or diarrhea or constipation, no blood in stools or black stools, no weight loss or night sweats. No hematuria, urgency, hesitancy, incontinence. No leg swelling or joint pain. No rash.    Hypothyroidism managed by [facility].    Problem list and histories reviewed & adjusted, as indicated.  Additional history: as documented    Patient Active Problem List   Diagnosis     Acquired hypothyroidism     Insomnia     Dyspareunia     Orgasm dysfunction     Adjustment disorder with anxious mood     Past Surgical History:   Procedure Laterality Date     C NONSPECIFIC PROCEDURE  2005         C NONSPECIFIC PROCEDURE  10/78    Tonsillectomy       Social History   Substance Use Topics     Smoking status: Never Smoker     Smokeless tobacco: Never Used     Alcohol use Yes      Comment: 5 drinks per week     Family History    Problem Relation Age of Onset     Hypertension Mother      Breast Cancer Mother      58     Other Cancer Mother      pancreatic CA with mets     Neurologic Disorder Father      migraines + Menieres     Connective Tissue Disorder Father      ichtyosis     Alcohol/Drug Maternal Grandmother      ETOH     Respiratory Maternal Grandfather      emphysema     C.A.D. Paternal Grandmother      Other - See Comments Son      ichthyosis         Current Outpatient Prescriptions   Medication Sig Dispense Refill     desonide (DESOWEN) 0.05 % lotion   1     doxylamine (UNISOM) 25 MG TABS Take 1 tablet (25 mg) by mouth At Bedtime (Patient taking differently: Take 25 mg by mouth PRN) 30 each 11     levonorgestrel (MIRENA, 52 MG,) 20 MCG/24HR IUD 1 each by Intrauterine route once       levothyroxine (SYNTHROID/LEVOTHROID) 137 MCG tablet Take 1 tablet (137 mcg) by mouth daily . Managed by endocrinology ([facility]). 90 tablet 3     nitroFURantoin, macrocrystal-monohydrate, (MACROBID) 100 MG capsule Take 1 capsule (100 mg) by mouth 2 times daily 14 capsule 0     sertraline (ZOLOFT) 50 MG tablet Take 1 tablet (50 mg) by mouth daily 90 tablet 3     Allergies   Allergen Reactions     No Known Drug Allergies      Recent Labs   Lab Test  08/12/16   0845  05/13/15   1513  10/29/13   1335  05/02/13   1132   LDL  134*  139*   --   102   HDL  73  77   --   84   TRIG  46  47   --   59   ALT   --    --   25   --    CR   --    --   0.80   --    GFRESTIMATED   --    --   78   --    GFRESTBLACK   --    --   >90   --    POTASSIUM   --    --   4.1   --    TSH   --   1.77  2.50   --       BP Readings from Last 3 Encounters:   04/27/18 119/72   08/14/17 104/68   08/14/17 104/68    Wt Readings from Last 3 Encounters:   04/27/18 176 lb (79.8 kg)   08/14/17 159 lb (72.1 kg)   08/14/17 159 lb (72.1 kg)                  Labs reviewed in EPIC    Reviewed and updated as needed this visit by clinical staff  Tobacco  Allergies  Meds      "  Reviewed and updated as needed this visit by Provider         ROS:  Constitutional, HEENT, cardiovascular, pulmonary, GI, , musculoskeletal, neuro, skin, endocrine and psych systems are negative, except as otherwise noted.    OBJECTIVE:     /72 (BP Location: Left arm, Patient Position: Chair, Cuff Size: Adult Regular)  Pulse 72  Temp 97.9  F (36.6  C) (Oral)  Resp 20  Ht 5' 4\" (1.626 m)  Wt 176 lb (79.8 kg)  SpO2 99%  BMI 30.21 kg/m2  Body mass index is 30.21 kg/(m^2).  GENERAL: healthy, alert and no distress  EYES: Eyes grossly normal to inspection, PERRL and conjunctivae and sclerae normal  HENT: ear canals and TM's normal, nose and mouth without ulcers or lesions, lesion on tongue monitored by dentist assessed benign per report  NECK: no adenopathy, no asymmetry, masses, or scars and thyroid normal to palpation  RESP: lungs clear to auscultation - no rales, rhonchi or wheezes  CV: regular rate and rhythm, normal S1 S2, no S3 or S4, no murmur, click or rub, no peripheral edema and peripheral pulses strong  ABDOMEN: soft, non tender, no hepatosplenomegaly, no masses and bowel sounds normal, no CVA tenderness  MS: no gross musculoskeletal defects noted, no edema  SKIN: no suspicious lesions or rashes  NEURO: Normal strength and tone, mentation intact and speech normal  PSYCH: mentation appears normal, affect normal/bright    Diagnostic Test Results:  Results for orders placed or performed in visit on 04/27/18 (from the past 24 hour(s))   UA reflex to Microscopic and Culture   Result Value Ref Range    Color Urine Yellow     Appearance Urine Cloudy     Glucose Urine Negative NEG^Negative mg/dL    Bilirubin Urine Negative NEG^Negative    Ketones Urine Negative NEG^Negative mg/dL    Specific Gravity Urine 1.025 1.003 - 1.035    Blood Urine Moderate (A) NEG^Negative    pH Urine 7.0 5.0 - 7.0 pH    Protein Albumin Urine 100 (A) NEG^Negative mg/dL    Urobilinogen Urine 0.2 0.2 - 1.0 EU/dL    Nitrite Urine " Negative NEG^Negative    Leukocyte Esterase Urine Large (A) NEG^Negative    Source Midstream Urine    Wet prep   Result Value Ref Range    Specimen Description Vagina     Wet Prep No clue cells seen     Wet Prep No yeast seen     Wet Prep No Trichomonas seen    Urine Microscopic   Result Value Ref Range    WBC Urine >100 (A) OTO5^0 - 5 /HPF    RBC Urine 2-5 (A) OTO2^O - 2 /HPF    Bacteria Urine Moderate (A) NEG^Negative /HPF       ASSESSMENT/PLAN:   History of hypothyroidism, euthyroid on levothyroxine managed by [facility], adjustment disorder with anxious mood and dyspareunia on sertraline, orgasm dysfunction, Mirena IUD in place, insomnia on Unisom as needed, prior  and tonsillectomy, seen by PCP 2017 for physical and by podiatry 2017 for right hallux limit us opted for non operative treatment.  Here today for    1. Urinary tract infection, bacterial  Macrobid bid 7 days, await culture results, void after coitus, push fluids, try azo OTC for symptomatic relief, go to the ER if has high fever and back pain or condition worsens.   - UA reflex to Microscopic and Culture  - Wet prep  - Urine Microscopic  - Urine Culture Aerobic Bacterial  - nitrofurantoin, macrocrystal-monohydrate, (MACROBID) 100 MG capsule; Take 1 capsule (100 mg) by mouth 2 times daily  Dispense: 14 capsule; Refill: 0    2. Urinary frequency  - UA reflex to Microscopic and Culture  - Wet prep  - Urine Microscopic  - Urine Culture Aerobic Bacterial  - nitrofurantoin, macrocrystal-monohydrate, (MACROBID) 100 MG capsule; Take 1 capsule (100 mg) by mouth 2 times daily  Dispense: 14 capsule; Refill: 0    3. Dysuria  - UA reflex to Microscopic and Culture  - Wet prep  - Urine Microscopic  - Urine Culture Aerobic Bacterial  - nitrofurantoin, macrocrystal-monohydrate, (MACROBID) 100 MG capsule; Take 1 capsule (100 mg) by mouth 2 times daily  Dispense: 14 capsule; Refill: 0    4. Nonspecific finding on examination  of urine  - Urine Culture Aerobic Bacterial    See Patient Instructions  Patient Instructions     Urinary Tract Infections in Women    Urinary tract infections (UTIs) are most often caused by bacteria. These bacteria enter the urinary tract. The bacteria may come from outside the body. Or they may travel from the skin outside the rectum or vagina into the urethra. Female anatomy makes it easy for bacteria from the bowel to enter a woman s urinary tract, which is the most common source of UTI. This means women develop UTIs more often than men. Pain in or around the urinary tract is a common UTI symptom. But the only way to know for sure if you have a UTI for the healthcare provider to test your urine. The two tests that may be done are the urinalysis and urine culture.  Types of UTIs    Cystitis. A bladder infection (cystitis) is the most common UTI in women. You may have urgent or frequent urination. You may also have pain, burning when you urinate, and bloody urine.    Urethritis. This is an inflamed urethra, which is the tube that carries urine from the bladder to outside the body. You may have lower stomach or back pain. You may also have urgent or frequent urination.    Pyelonephritis. This is a kidney infection. If not treated, it can be serious and damage your kidneys. In severe cases, you may need to stay in the hospital. You may have a fever and lower back pain.  Medicines to treat a UTI  Most UTIs are treated with antibiotics. These kill the bacteria. The length of time you need to take them depends on the type of infection. It may be as short as 3 days. If you have repeated UTIs, you may need a low-dose antibiotic for several months. Take antibiotics exactly as directed. Don t stop taking them until all of the medicine is gone. If you stop taking the antibiotic too soon, the infection may not go away. You may also develop a resistance to the antibiotic. This can make it much harder to treat.  Lifestyle  changes to treat and prevent UTIs  The lifestyle changes below will help get rid of your UTI. They may also help prevent future UTIs.    Drink plenty of fluids. This includes water, juice, or other caffeine-free drinks. Fluids help flush bacteria out of your body.    Empty your bladder. Always empty your bladder when you feel the urge to urinate. And always urinate before going to sleep. Urine that stays in your bladder can lead to infection. Try to urinate before and after sex as well.    Practice good personal hygiene. Wipe yourself from front to back after using the toilet. This helps keep bacteria from getting into the urethra.    Use condoms during sex. These help prevent UTIs caused by sexually transmitted bacteria. Also don't use spermicides during sex. These can increase the risk for UTIs. Choose other forms of birth control instead. For women who tend to get UTIs after sex, a low-dose of a preventive antibiotic may be used. Be sure to discuss this option with your healthcare provider.    Follow up with your healthcare provider as directed. He or she may test to make sure the infection has cleared. If needed, more treatment may be started.  Date Last Reviewed: 1/1/2017 2000-2017 The "[employer] [address] [place] [address], [place] [address]. All rights reserved. This information is not intended as a substitute for professional medical care. Always follow your healthcare professional's instructions.            Tana Mendoza MD  [facility]     No

## 2024-11-03 ENCOUNTER — HEALTH MAINTENANCE LETTER (OUTPATIENT)
Age: 54
End: 2024-11-03

## 2024-11-23 SDOH — HEALTH STABILITY: PHYSICAL HEALTH: ON AVERAGE, HOW MANY DAYS PER WEEK DO YOU ENGAGE IN MODERATE TO STRENUOUS EXERCISE (LIKE A BRISK WALK)?: 5 DAYS

## 2024-11-23 SDOH — HEALTH STABILITY: PHYSICAL HEALTH: ON AVERAGE, HOW MANY MINUTES DO YOU ENGAGE IN EXERCISE AT THIS LEVEL?: 50 MIN

## 2024-11-23 ASSESSMENT — SOCIAL DETERMINANTS OF HEALTH (SDOH): HOW OFTEN DO YOU GET TOGETHER WITH FRIENDS OR RELATIVES?: MORE THAN THREE TIMES A WEEK

## 2024-11-26 ENCOUNTER — OFFICE VISIT (OUTPATIENT)
Dept: FAMILY MEDICINE | Facility: CLINIC | Age: 54
End: 2024-11-26
Payer: COMMERCIAL

## 2024-11-26 VITALS
HEART RATE: 63 BPM | TEMPERATURE: 98.1 F | WEIGHT: 176 LBS | SYSTOLIC BLOOD PRESSURE: 120 MMHG | DIASTOLIC BLOOD PRESSURE: 82 MMHG | RESPIRATION RATE: 14 BRPM | BODY MASS INDEX: 30.05 KG/M2 | HEIGHT: 64 IN | OXYGEN SATURATION: 98 %

## 2024-11-26 DIAGNOSIS — E06.3 HYPOTHYROIDISM DUE TO HASHIMOTO'S THYROIDITIS: ICD-10-CM

## 2024-11-26 DIAGNOSIS — Z00.00 ANNUAL PHYSICAL EXAM: Primary | ICD-10-CM

## 2024-11-26 DIAGNOSIS — Z13.1 SCREENING FOR DIABETES MELLITUS: ICD-10-CM

## 2024-11-26 DIAGNOSIS — Z13.6 SCREENING FOR HEART DISEASE: ICD-10-CM

## 2024-11-26 DIAGNOSIS — Z11.59 NEED FOR HEPATITIS C SCREENING TEST: ICD-10-CM

## 2024-11-26 DIAGNOSIS — E03.9 ACQUIRED HYPOTHYROIDISM: ICD-10-CM

## 2024-11-26 DIAGNOSIS — I10 BENIGN ESSENTIAL HYPERTENSION: ICD-10-CM

## 2024-11-26 PROCEDURE — 99396 PREV VISIT EST AGE 40-64: CPT | Mod: 25

## 2024-11-26 PROCEDURE — 90746 HEPB VACCINE 3 DOSE ADULT IM: CPT

## 2024-11-26 PROCEDURE — 90471 IMMUNIZATION ADMIN: CPT

## 2024-11-26 RX ORDER — LEVOTHYROXINE SODIUM 137 UG/1
137 TABLET ORAL DAILY
Qty: 90 TABLET | Refills: 0 | Status: SHIPPED | OUTPATIENT
Start: 2024-11-26

## 2024-11-26 NOTE — PROGRESS NOTES
Preventive Care Visit  [facility] INTEGRATED PRIMARY CARE  Reno Morrell NP, Nurse Practitioner Primary Care  2024      Assessment & Plan     Hypothyroidism due to Hashimoto's thyroiditis    Need for hepatitis C screening test  - Hepatitis C Screen Reflex to HCV RNA Quant and Genotype; Future    Benign essential hypertension  - Basic metabolic panel  (Ca, Cl, CO2, Creat, Gluc, K, Na, BUN); Future  Recommend that patient purchase blood pressure cuff to monitor blood pressure at home.  Her pressure was controlled today, but she was wondering if it is a concern for her to be chronically high.  Previous  visits show she tends to run in the 140s.  I recommend she monitor her blood pressure and consult with us if she is running chronically at 140s/90s.    Acquired hypothyroidism  - TSH with free T4 reflex; Future  - levothyroxine (SYNTHROID/LEVOTHROID) 137 MCG tablet; Take 1 tablet (137 mcg) by mouth daily.    Screening for heart disease  - Lipid panel reflex to direct LDL Fasting; Future    Screening for diabetes mellitus  - Hemoglobin A1c; Future    Annual physical  Patient has been advised of split billing requirements and indicates understanding: Yes        Lacho Odonnell is a 54 year old, presenting for the following:    Physical    Hoping for consistent PCP. Mom  of pancreatic cancer (70's).    Follow-up pap: due 2025.  Colonoscopy: due . Last colonoscpy 2021 had hyperplastic polyps.  Mammogram: Non-BRCA positive. Recommended to get mammogram and MRI every 6 months.    Blood pressure was rising. She has noticed herself in menopause and now.    Exercise: free weight lifing in the past 6 months. Trying to play pickle ball.  Walking on occasion.  Weight lifting 3 times per week. Pickleball -once per week.  Diet: trying to do intermittent fasting. 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 Enjoys her job- debt management.  Single. No concern for STDs. 2 sons-23 (lives in [facility], works at [employer]-wants to be musician), 19 (lives in [place], first year student).         11/26/2024    10:24 AM   Additional Questions   Roomed by Mariana TALAVERA      11/23/2024   General Health   How would you rate your overall physical health? Good   Feel stress (tense, anxious, or unable to sleep) Only a little      (!) STRESS CONCERN      11/23/2024   Nutrition   Three or more servings of calcium each day? Yes   Diet: Regular (no restrictions)   How many servings of fruit and vegetables per day? 4 or more   How many sweetened beverages each day? 0-1            11/23/2024   Exercise   Days per week of moderate/strenous exercise 5 days   Average minutes spent exercising at this level 50 min            11/23/2024   Social Factors   Frequency of gathering with friends or relatives More than three times a week   Worry food won't last until get money to buy more No   Food not last or not have enough money for food? No   Do you have housing? (Housing is defined as stable permanent housing and does not include staying ouside in a car, in a tent, in an abandoned building, in an overnight shelter, or couch-surfing.) Yes   Are you worried about losing your housing? No   Lack of transportation? No   Unable to get utilities (heat,electricity)? No            11/23/2024   Fall Risk   Fallen 2 or more times in the past year? No    Trouble with walking or balance? No        Patient-reported          11/23/2024   Dental   Dentist two times every year? Yes            11/23/2024   TB Screening   Were you born outside of the [place]? No                  11/23/2024   Substance Use   Alcohol more than 3/day or more than 7/wk No   Do you use any other substances recreationally? No        Social History     Tobacco Use    Smoking status: Never     Passive exposure: Never    Smokeless tobacco: Never   Vaping Use  "   Vaping status: Never Used   Substance Use Topics    Alcohol use: Yes     Comment: 5 drinks per week    Drug use: No           6/12/2024   LAST FHS-7 RESULTS   1st degree relative breast or ovarian cancer Yes   Any relative bilateral breast cancer No   Any male have breast cancer No   Any ONE woman have BOTH breast AND ovarian cancer No   Any woman with breast cancer before 50yrs No   2 or more relatives with breast AND/OR ovarian cancer No   2 or more relatives with breast AND/OR bowel cancer No           Based on genetic testing, reports recommended to g        11/23/2024   STI Screening   New sexual partner(s) since last STI/HIV test? No        History of abnormal Pap smear: HPV18. Recommended to get screening in 2025        Latest Ref Rng & Units 6/7/2022    11:29 AM 4/14/2021     9:33 AM 4/14/2021     9:20 AM   PAP / HPV   PAP  Negative for Intraepithelial Lesion or Malignancy (NILM)      PAP (Historical)    NIL    HPV 16 DNA Negative Negative  Negative     HPV 18 DNA Negative Negative  Positive     Other HR HPV Negative Negative  Negative       ASCVD Risk   The 10-year ASCVD risk score (Radha NAYAK, et al., 2019) is: 1.7%    Values used to calculate the score:      Age: 54 years      Sex: Female      Is Non- : No      Diabetic: No      Tobacco smoker: No      Systolic Blood Pressure: 120 mmHg      Is BP treated: No      HDL Cholesterol: 61 mg/dL      Total Cholesterol: 188 mg/dL           Reviewed and updated as needed this visit by Provider                         Objective    Exam  /82 (BP Location: Right arm, Patient Position: Sitting, Cuff Size: Adult Regular)   Pulse 63   Temp 98.1  F (36.7  C) (Temporal)   Resp 14   Ht 1.616 m (5' 3.62\")   Wt 79.8 kg (176 lb)   SpO2 98%   BMI 30.57 kg/m     Estimated body mass index is 30.57 kg/m  as calculated from the following:    Height as of this encounter: 1.616 m (5' 3.62\").    Weight as of this encounter: 79.8 kg (176 " lb).    Physical Exam  GENERAL: alert and no distress  EYES: Eyes grossly normal to inspection, PERRL and conjunctivae and sclerae normal  HENT: ear canals and TM's normal, nose and mouth without ulcers or lesions  NECK: no adenopathy, no asymmetry, masses, or scars  RESP: lungs clear to auscultation - no rales, rhonchi or wheezes  CV: regular rate and rhythm, normal S1 S2, no S3 or S4, no murmur, click or rub, no peripheral edema  ABDOMEN: soft, nontender, no hepatosplenomegaly, no masses and bowel sounds normal  MS: no gross musculoskeletal defects noted, no edema  SKIN: no suspicious lesions or rashes  NEURO: Normal strength and tone, mentation intact and speech normal  PSYCH: mentation appears normal, affect normal/bright        Signed Electronically by: Reno Morrell NP

## 2024-12-03 NOTE — CONFIDENTIAL NOTE
RECORDS RECEIVED FROM: internal    DATE RECEIVED: 12.20.24    NOTES (FOR ALL VISITS) STATUS DETAILS   OFFICE NOTES from referring provider internal    Gifty Matthews MD      OFFICE NOTES from other specialist internal  8.2.23 Magdiel      MEDICATION LIST internal     LABS     DIABETES: HBGA1C, CREATININE, FASTING LIPIDS, MICROALBUMIN URINE, POTASSIUM, TSH, T4    THYROID: TSH, T4, CBC, THYRODLONULIN, TOTAL T3, FREE T4, CALCITONIN, CEA internal  HBGA1C- 8.2.23   Lipid- 8.2.23

## 2024-12-20 ENCOUNTER — PRE VISIT (OUTPATIENT)
Dept: ENDOCRINOLOGY | Facility: CLINIC | Age: 54
End: 2024-12-20

## 2025-01-06 NOTE — PROGRESS NOTES
FOLLOW UP  2025     Belle Tejada is a 54 year old woman who is followed for family history of breast cancer.      HPI:     Family history of breast cancer in her mother who was diagnosed at age 58. Belle had genetic testing that was negative.      Today she denies any breast concerns today including mass, skin change, nipple inversion or nipple discharge. She is due for a breast MRI today.     BREAST-SPECIFIC HISTORY:     Previous breast imaging: Yes   - 13 Smammo BI-RADS 1  - 14 Smammo BI-RADS 1  - 7/20/15 Smammo: right breast asymmetry 10:00 BI-RADS 0  - 7/30/15 right Dmammo and ultrasound BI-RADS 1  - 16 Smammo  Smammo BI-RADS 1  - 10/31/17 Smammo BI-RADS 1  - 18 Smammo BI-RADS 1  - 19  Smammo BI-RADS 1  - 21  Smammo BI-RADS 1  - 10/4/21 breast MRI BI-RADS 2  - 23 Smammo BI-RADS 1  - 23 Smammo BI-RADS 1  - 23 breast MRI BI-RADS 1  - 24 Smammo BI-RADS 2  - 25 breast MRI     Prior breast biopsies/surgeries: No     Prior history of breast cancer or DCIS: No  Prior radiation history: No   Self breast exams: Yes  Breast density: heterogeneously dense     GYN HISTORY:  . Age at 1st pregnancy: 30. Breastfeeding history: Yes.   Age at menarche: 10  Menopausal: post menopausal  Menopausal hormone replacement therapy: No         RISK ASSESSMENT: < 3% 5 year risk and > 20% lifetime risk   Marni: 2.5% 5 year risk   ALMA/Tyrer-Cuzick: 26.1%      FAMILY HISTORY:  Breast ca: Yes   - mother, 58  Ovarian ca: No  Pancreatic ca: Yes   - mother  Prostate: No  Gastric ca: No  Melanoma: No  Thyroid cancer: yes  - maternal aunt's daughter with thyroid cancer  Colon ca: No  Other cancer: Yes   - paternal grandfather with lung cancer.   Other genetic, testing, syndromes, or clotting disorders: no     PAST MEDICAL HISTORY  Past Medical History:   Diagnosis Date    Adjustment disorder with anxious mood 2015    CARDIOVASCULAR SCREENING; LDL GOAL LESS THAN 160  2010    Cervical high risk HPV (human papillomavirus) test positive 2021    Insomnia 2013    Unspecified hypothyroidism     Hypothyroidism     PAST SURGICAL HISTORY   Past Surgical History:   Procedure Laterality Date    COLONOSCOPY N/A 2021    Procedure: COLONOSCOPY, WITH POLYPECTOMY;  Surgeon: Fannie Huff MD;  Location: [facility] [place]    [facility] NONSPECIFIC PROCEDURE  2005        [facility] NONSPECIFIC PROCEDURE  10/78    Tonsillectomy     MEDICATIONS  Current Outpatient Medications   Medication Sig Dispense Refill    levonorgestrel (MIRENA, 52 MG,) 20 MCG/24HR IUD 1 each by Intrauterine route once      levothyroxine (SYNTHROID/LEVOTHROID) 137 MCG tablet Take 1 tablet (137 mcg) by mouth daily. 90 tablet 3   Contraception: IUD mirena     ALLERGIES  Allergies   Allergen Reactions    No Known Drug Allergy       SOCIAL HISTORY:  Smokes: No  EtOH: weekends  Exercise: walking and bike, weight training  Works as  at the [employer]. She has grown children. She sings. She is traveling to [place] with her father this winter    ROS:  Change in vision No  Headaches: no  Respiratory: No shortness of breath, dyspnea on exertion, cough, or hemoptysis   Cardiovascular: negative   Gastrointestinal: negative Abdominal pain: no  Breast: negative  Musculoskeletal: negative Joint pain No Back pain: no  Psychiatric: negative  Hematologic/Lymphatic/Immunologic: negative  Endocrine: negative    EXAM  There were no vitals taken for this visit.   PHYSICAL EXAM  Respiratory: breathing non labored.   Breasts: Examination was done in both the upright and supine positions.  Breasts are symmetrical . No dominant masses noted. No skin or nipple changes. No nipple discharge.   No clavicular, cervical, or axillary lymphadenopathy.     INVESTIGATIONS:     breast MRI results pending.     ASSESSMENT/PLAN:    Belle Tejada is a 54 year old woman with family history of breast cancer. She  meets NCCN guidelines for high risk screening.      1) Family history of breast cacner.   She meets NCCN guidelines for high risk screening based on family history with lifetime risk for breast cancer of >20%. Screening recommendations based on NCCN guidelines. Clinical encounter every 6-12 months starting now. Annual mammogram alternating with annual breast MRI.    - Screening mammogram with clinic visit due: 7/2025  - Breast MRI with clinic visit due 1/9/26 she is considering discontinue breast MRI if she continues to have difficulty with insurance coverage.      2) Counseling was provided with available strategies including lifestyle modifications and risk reducing intervention.  - Maintain your best healthy weight. Higher body fat and adult weight gain is associated with increased risk for breast cancer. This increase in risk has been attributed to increase in circulating endogenous estrogen levels from fat tissue.   - Alcohol can raise estrogen. Alcohol consumption, even at moderate levels (1-2 drinks per day), increases breast cancer risk and are best avoided. If you choose to drink alcohol limit alcohol consumption to less than 1 drink per day. (1 ounce of liquor, 6 ounces of wine, or 8 ounces of beer).  - Be active daily and void being sedentary.     Janae Frederick PA-C    20 minutes spent on the date of the encounter doing chart review, review of test results, interpretation of tests, patient visit and documentation.

## 2025-01-08 ENCOUNTER — ANCILLARY PROCEDURE (OUTPATIENT)
Dept: MRI IMAGING | Facility: CLINIC | Age: 55
End: 2025-01-08
Attending: PHYSICIAN ASSISTANT
Payer: COMMERCIAL

## 2025-01-08 ENCOUNTER — ONCOLOGY VISIT (OUTPATIENT)
Dept: SURGERY | Facility: CLINIC | Age: 55
End: 2025-01-08
Attending: PHYSICIAN ASSISTANT
Payer: COMMERCIAL

## 2025-01-08 VITALS
DIASTOLIC BLOOD PRESSURE: 86 MMHG | WEIGHT: 177.9 LBS | BODY MASS INDEX: 30.54 KG/M2 | OXYGEN SATURATION: 97 % | RESPIRATION RATE: 12 BRPM | HEART RATE: 76 BPM | TEMPERATURE: 98.3 F | SYSTOLIC BLOOD PRESSURE: 134 MMHG

## 2025-01-08 DIAGNOSIS — Z12.31 ENCOUNTER FOR SCREENING MAMMOGRAM FOR BREAST CANCER: Primary | ICD-10-CM

## 2025-01-08 DIAGNOSIS — Z91.89 AT HIGH RISK FOR BREAST CANCER: ICD-10-CM

## 2025-01-08 PROCEDURE — 99213 OFFICE O/P EST LOW 20 MIN: CPT | Performed by: PHYSICIAN ASSISTANT

## 2025-01-08 PROCEDURE — A9585 GADOBUTROL INJECTION: HCPCS | Mod: JZ | Performed by: RADIOLOGY

## 2025-01-08 PROCEDURE — 77049 MRI BREAST C-+ W/CAD BI: CPT | Mod: GC | Performed by: RADIOLOGY

## 2025-01-08 RX ORDER — GADOBUTROL 604.72 MG/ML
7.5 INJECTION INTRAVENOUS ONCE
Status: COMPLETED | OUTPATIENT
Start: 2025-01-08 | End: 2025-01-08

## 2025-01-08 RX ADMIN — GADOBUTROL 7.5 ML: 604.72 INJECTION INTRAVENOUS at 08:41

## 2025-01-08 ASSESSMENT — PAIN SCALES - GENERAL: PAINLEVEL_OUTOF10: NO PAIN (0)

## 2025-01-08 NOTE — PATIENT INSTRUCTIONS
Belle Tejada is a 54 year old woman with family history of breast cancer. She meets NCCN guidelines for high risk screening.      1) Family history of breast cacner.   She meets NCCN guidelines for high risk screening based on family history with lifetime risk for breast cancer of >20%. Screening recommendations based on NCCN guidelines. Clinical encounter every 6-12 months starting now. Annual mammogram alternating with annual breast MRI.    - Screening mammogram with clinic visit due: 7/2025  - Breast MRI with clinic visit due 1/9/26she is considering discontinue breast MRI if she continues to have difficulty with insurance coverage.      2) Counseling was provided with available strategies including lifestyle modifications and risk reducing intervention.  - Maintain your best healthy weight. Higher body fat and adult weight gain is associated with increased risk for breast cancer. This increase in risk has been attributed to increase in circulating endogenous estrogen levels from fat tissue.   - Alcohol can raise estrogen. Alcohol consumption, even at moderate levels (1-2 drinks per day), increases breast cancer risk and are best avoided. If you choose to drink alcohol limit alcohol consumption to less than 1 drink per day. (1 ounce of liquor, 6 ounces of wine, or 8 ounces of beer).  - Be active daily and void being sedentary.

## 2025-01-08 NOTE — LETTER
2025      Belle Tejada  [address]  [facility] [address]      Dear Colleague,    Thank you for referring your patient, Belle Tejada, to the [facility]. Please see a copy of my visit note below.    FOLLOW UP  2025     Belle Tejada is a 54 year old woman who is followed for family history of breast cancer.      HPI:     Family history of breast cancer in her mother who was diagnosed at age 58. Belle had genetic testing that was negative.      Today she denies any breast concerns today including mass, skin change, nipple inversion or nipple discharge. She is due for a breast MRI today.     BREAST-SPECIFIC HISTORY:     Previous breast imaging: Yes   - 13 Smammo BI-RADS 1  - 14 Smammo BI-RADS 1  - 7/20/15 Smammo: right breast asymmetry 10:00 BI-RADS 0  - 7/30/15 right Dmammo and ultrasound BI-RADS 1  - 16 Smammo  Smammo BI-RADS 1  - 10/31/17 Smammo BI-RADS 1  - 18 Smammo BI-RADS 1  - 19  Smammo BI-RADS 1  - 21  Smammo BI-RADS 1  - 10/4/21 breast MRI BI-RADS 2  - 23 Smammo BI-RADS 1  - 23 Smammo BI-RADS 1  - 23 breast MRI BI-RADS 1  - 24 Smammo BI-RADS 2  - 25 breast MRI     Prior breast biopsies/surgeries: No     Prior history of breast cancer or DCIS: No  Prior radiation history: No   Self breast exams: Yes  Breast density: heterogeneously dense     GYN HISTORY:  . Age at 1st pregnancy: 30. Breastfeeding history: Yes.   Age at menarche: 10  Menopausal: post menopausal  Menopausal hormone replacement therapy: No         RISK ASSESSMENT: < 3% 5 year risk and > 20% lifetime risk   Marni: 2.5% 5 year risk   ALMA/Tyrer-Cuzick: 26.1%      FAMILY HISTORY:  Breast ca: Yes   - mother, 58  Ovarian ca: No  Pancreatic ca: Yes   - mother  Prostate: No  Gastric ca: No  Melanoma: No  Thyroid cancer: yes  - maternal aunt's daughter with thyroid cancer  Colon ca: No  Other cancer: Yes   - paternal grandfather with lung  cancer.   Other genetic, testing, syndromes, or clotting disorders: no     PAST MEDICAL HISTORY  Past Medical History:   Diagnosis Date     Adjustment disorder with anxious mood 2015     CARDIOVASCULAR SCREENING; LDL GOAL LESS THAN 160 2010     Cervical high risk HPV (human papillomavirus) test positive 2021     Insomnia 2013     Unspecified hypothyroidism     Hypothyroidism     PAST SURGICAL HISTORY   Past Surgical History:   Procedure Laterality Date     COLONOSCOPY N/A 2021    Procedure: COLONOSCOPY, WITH POLYPECTOMY;  Surgeon: Fannie Huff MD;  [place] [facility] [place]     [facility] NONSPECIFIC PROCEDURE  2005         [facility] NONSPECIFIC PROCEDURE  10/78    Tonsillectomy     MEDICATIONS  Current Outpatient Medications   Medication Sig Dispense Refill     levonorgestrel (MIRENA, 52 MG,) 20 MCG/24HR IUD 1 each by Intrauterine route once       levothyroxine (SYNTHROID/LEVOTHROID) 137 MCG tablet Take 1 tablet (137 mcg) by mouth daily. 90 tablet 3   Contraception: IUD mirena     ALLERGIES  Allergies   Allergen Reactions     No Known Drug Allergy       SOCIAL HISTORY:  Smokes: No  EtOH: weekends  Exercise: walking and bike, weight training  Works as  at the [employer]. She has grown children. She sings. She is traveling to [place] with her father this winter    ROS:  Change in vision No  Headaches: no  Respiratory: No shortness of breath, dyspnea on exertion, cough, or hemoptysis   Cardiovascular: negative   Gastrointestinal: negative Abdominal pain: no  Breast: negative  Musculoskeletal: negative Joint pain No Back pain: no  Psychiatric: negative  Hematologic/Lymphatic/Immunologic: negative  Endocrine: negative    EXAM  There were no vitals taken for this visit.   PHYSICAL EXAM  Respiratory: breathing non labored.   Breasts: Examination was done in both the upright and supine positions.  Breasts are symmetrical . No dominant masses noted. No skin or nipple  changes. No nipple discharge.   No clavicular, cervical, or axillary lymphadenopathy.     INVESTIGATIONS:    /8/25 breast MRI results pending.     ASSESSMENT/PLAN:    Belle Tejada is a 54 year old woman with family history of breast cancer. She meets NCCN guidelines for high risk screening.      1) Family history of breast cacner.   She meets NCCN guidelines for high risk screening based on family history with lifetime risk for breast cancer of >20%. Screening recommendations based on NCCN guidelines. Clinical encounter every 6-12 months starting now. Annual mammogram alternating with annual breast MRI.    - Screening mammogram with clinic visit due: 7/2025  - Breast MRI with clinic visit due 1/9/26she is considering discontinue breast MRI if she continues to have difficulty with insurance coverage.      2) Counseling was provided with available strategies including lifestyle modifications and risk reducing intervention.  - Maintain your best healthy weight. Higher body fat and adult weight gain is associated with increased risk for breast cancer. This increase in risk has been attributed to increase in circulating endogenous estrogen levels from fat tissue.   - Alcohol can raise estrogen. Alcohol consumption, even at moderate levels (1-2 drinks per day), increases breast cancer risk and are best avoided. If you choose to drink alcohol limit alcohol consumption to less than 1 drink per day. (1 ounce of liquor, 6 ounces of wine, or 8 ounces of beer).  - Be active daily and void being sedentary.     Janae Frederick PA-C    20 minutes spent on the date of the encounter doing chart review, review of test results, interpretation of tests, patient visit and documentation.       Again, thank you for allowing me to participate in the care of your patient.        Sincerely,        Janae Frederick PA-C    Electronically signed

## 2025-01-30 ENCOUNTER — OFFICE VISIT (OUTPATIENT)
Dept: FAMILY MEDICINE | Facility: CLINIC | Age: 55
End: 2025-01-30
Payer: COMMERCIAL

## 2025-01-30 VITALS
TEMPERATURE: 97.3 F | BODY MASS INDEX: 30.22 KG/M2 | HEIGHT: 64 IN | WEIGHT: 177 LBS | HEART RATE: 64 BPM | OXYGEN SATURATION: 98 % | SYSTOLIC BLOOD PRESSURE: 125 MMHG | DIASTOLIC BLOOD PRESSURE: 88 MMHG

## 2025-01-30 DIAGNOSIS — Z87.898 H/O MOTION SICKNESS: ICD-10-CM

## 2025-01-30 DIAGNOSIS — Z71.84 TRAVEL ADVICE ENCOUNTER: Primary | ICD-10-CM

## 2025-01-30 PROCEDURE — 90471 IMMUNIZATION ADMIN: CPT | Mod: GA | Performed by: NURSE PRACTITIONER

## 2025-01-30 PROCEDURE — 90707 MMR VACCINE SC: CPT | Mod: GA | Performed by: NURSE PRACTITIONER

## 2025-01-30 PROCEDURE — 90472 IMMUNIZATION ADMIN EACH ADD: CPT | Mod: GA | Performed by: NURSE PRACTITIONER

## 2025-01-30 PROCEDURE — 90717 YELLOW FEVER VACCINE SUBQ: CPT | Mod: GA | Performed by: NURSE PRACTITIONER

## 2025-01-30 PROCEDURE — 99402 PREV MED CNSL INDIV APPRX 30: CPT | Mod: 25 | Performed by: NURSE PRACTITIONER

## 2025-01-30 RX ORDER — SCOPOLAMINE 1 MG/3D
1 PATCH, EXTENDED RELEASE TRANSDERMAL
Qty: 2 PATCH | Refills: 0 | Status: SHIPPED | OUTPATIENT
Start: 2025-01-30

## 2025-01-30 RX ORDER — ONDANSETRON 4 MG/1
4 TABLET, ORALLY DISINTEGRATING ORAL EVERY 8 HOURS PRN
Qty: 10 TABLET | Refills: 0 | Status: SHIPPED | OUTPATIENT
Start: 2025-01-30

## 2025-01-30 RX ORDER — AZITHROMYCIN 500 MG/1
500 TABLET, FILM COATED ORAL DAILY
Qty: 3 TABLET | Refills: 0 | Status: SHIPPED | OUTPATIENT
Start: 2025-01-30 | End: 2025-02-02

## 2025-01-30 ASSESSMENT — PAIN SCALES - GENERAL: PAINLEVEL_OUTOF10: NO PAIN (0)

## 2025-01-30 NOTE — PROGRESS NOTES
"Nurse Note ( Pre-Travel Consult)    Itinerary:  [place] and [place]    Departure Date: 3/4/25    Return Date: 3/23/25    Length of Trip 2 weeks    Reason for Travel: Tourism         Urban or rural: both    Accommodations: Hotel        IMMUNIZATION HISTORY  Have you received any immunizations within the past 4 weeks?  No  Have you ever fainted from having your blood drawn or from an injection?  No  Have you ever had a fever reaction to vaccination?  No  Have you ever had any bad reaction or side effect from any vaccination?  No  Do you live (or work closely) with anyone who has AIDS, an AIDS-like condition, any other immune disorder or who is on chemotherapy for cancer?  No  Do you have a family history of immunodeficiency?  No  Have you received any injection of immune globulin or any blood products during the past 12 months?  No    Patient roomed by Jarrell Monk  Belle Tejada is a 54 year old female seen today alone for counsultation for international travel.   Patient will be departing in  5 week(s) and  traveling with organized tour group.    OAT  and with father     Patient itinerary :  will be in the urban region of  [facility] and [place] ( [facility] ) then 4 nights on a ship  and [place] which risk for Dengue Fever, food borne illnesses, and motor vehicle accidents. exposure.      Patient's activities will include sightseeing and hiking.    Patient's country of birth is [place]    Special medical concerns: motion sickness   Pre-travel questionnaire was completed by patient and reviewed by provider.     Vitals: /88   Pulse 64   Temp 97.3  F (36.3  C) (Temporal)   Ht 1.613 m (5' 3.5\")   Wt 80.3 kg (177 lb)   SpO2 98%   BMI 30.86 kg/m    BMI= Body mass index is 30.86 kg/m .    EXAM:  General:  Well-nourished, well-developed in no acute distress.  Appears to be stated age, interacts appropriately and expresses understanding of information given to " patient.    Current Outpatient Medications   Medication Sig Dispense Refill    ondansetron (ZOFRAN ODT) 4 MG ODT tab Take 1 tablet (4 mg) by mouth every 8 hours as needed for nausea or vomiting. 10 tablet 0    scopolamine (TRANSDERM) 1 MG/3DAYS 72 hr patch Place 1 patch over 72 hours onto the skin every 72 hours. 2 patch 0    levonorgestrel (MIRENA, 52 MG,) 20 MCG/24HR IUD 1 each by Intrauterine route once      levothyroxine (SYNTHROID/LEVOTHROID) 137 MCG tablet Take 1 tablet (137 mcg) by mouth daily. 90 tablet 3     Patient Active Problem List   Diagnosis    Acquired hypothyroidism    Family history of pancreatic cancer    Cervical high risk HPV (human papillomavirus) test positive    Hypothyroidism due to Hashimoto's thyroiditis     Allergies   Allergen Reactions    No Known Drug Allergy          Immunizations discussed include:   Covid 19: Up to date  Hepatitis A:  Up to date  Hepatitis B: Up to date  Influenza: Up to date  Typhoid: Up to date  Rabies: Declined  reviewed managment of a animal bite or scratch (washing wound, seek medical care within 24 hours for post exposure prophylaxis )  Yellow Fever: Yellow Fever ordered/given today - side effects, precautions, allergies, risks discussed. Patient expressed understanding.  Albanian Encephalitis: Not indicated  Meningococcus: Not indicated  Tetanus/Diphtheria: Up to date  Measles/Mumps/Rubella: Ordered/given today  Cholera: Not needed  Polio: Not indicated  Pneumococcal: Under age of 65  Varicella: Immune by disease history per patient report  Shingrix: Up to date  HPV:  Not indicated   [place]ung[place]: low risk season    Mpox:  Not indicated     TB: low risk     Altitude Exposure on this trip: no  Past tolerance to Altitude: na    ASSESSMENT/PLAN:  Belle was seen today for travel clinic.    Diagnoses and all orders for this visit:    Travel advice encounter  -     azithromycin (ZITHROMAX) 500 MG tablet; Take 1 tablet (500 mg) by mouth daily for 3 doses. Take 1  tablet a day for up to 3 days for severe diarrhea  -     scopolamine (TRANSDERM) 1 MG/3DAYS 72 hr patch; Place 1 patch over 72 hours onto the skin every 72 hours.    H/O motion sickness  -     scopolamine (TRANSDERM) 1 MG/3DAYS 72 hr patch; Place 1 patch over 72 hours onto the skin every 72 hours.  -     ondansetron (ZOFRAN ODT) 4 MG ODT tab; Take 1 tablet (4 mg) by mouth every 8 hours as needed for nausea or vomiting.    Other orders  -     YELLOW FEVER, LIVE SQ  -     MMR (M-M-R II)      I have reviewed general recommendations for safe travel   including: food/water precautions, insect precautions, safer sex   practices given high prevalence of Zika, HIV and other STDs,   roadway safety. Educational materials and [employer]ax report provided.    Malaraia prophylaxis recommended: not indicated  Symptomatic treatment for traveler's diarrhea: azithromycin  Motion sickness :  Zofran and Scop      Evacuation insurance advised and resources were provided to patient.    Total visit time 30 minutes  with over 50% of time spent counseling patient and shared decision making as detailed above.    Shama Baca CNP  Certificate in Travel Health

## 2025-01-30 NOTE — PATIENT INSTRUCTIONS
Thank you for visiting the [facility] : [phone]  Today January 30, 2025 you received the    Yellow Fever (YF)    MMR (Measles Mumps Rubella) Vaccine    Follow up vaccine appointments can be made as a NURSE ONLY visit at the Travel Clinic, (BE PREPARED TO WAIT, ) or at designated [facility].    If you are receiving the Rabies vaccines series, it is important that you follow the exact schedule ordered.     Pre-travel     We recommend that you purchase Medical Evacuation Insurance prior to your departure.  Https://wwwnc.cdc.gov/travel/page/insurance    [place] your travel plans with the  Department of State through STEP ( Smart Traveler Enrollment Program ) https://step.state.gov.  STEP is a free service to allow U[place] citizens and nationals traveling and living abroad to enroll their trip with the nearest U.S. Embassy or Consulate.    Animal Exposure: Avoid all mammals even if they look healthy.  If there is a bite, scratch or even a lick, wash area immediately with soap and water for 15 minutes and seek medical care within 24 hours for evaluation of Rabies post exposure treatment.  Contact your Medical Evacuation Insurance.    Repiratory illness prevention strategies ( Covid and Influenza ) CDC recommendations:  Consider wearing a mask in crowded or poorly ventilated indoor areas, including on public transportation and in transportation hubs.  Hand washing: frequent, thorough handwashing with soap and water for 20 seconds. Use an alcohol-based hand  with at least 60% alcohol if soap and water are not readily available. Avoid touching face, nose, eyes, mouth unless you have done appropriate hand washing as above.  VACCINES: Staying up to date on COVID-19 vaccines significantly lowers the risk of getting very sick, being hospitalized, or dying from COVID-19. CDC recommends that everyone stay up to date on their COVID-19 vaccines, especially people with  weakened immune systems.    Travel Covid 19 Testing:  updated 12/06/2021  International travelers: Pre-travel:  See country specific Embassy websites or airline websites.    Post travel: CDC recommends getting tested 3-5 days after your trip     Post-travel illness:  Contact your provider or [facility] if you develop a fever, rash, cough, diarrhea or other symptoms for up to 1 year after travel.  Inform your healthcare provider when and where you traveled to.    Please call the [facility] with any questions [phone]  Or send your provider a 'My Chart' note.

## 2025-05-07 ENCOUNTER — PATIENT OUTREACH (OUTPATIENT)
Dept: CARE COORDINATION | Facility: CLINIC | Age: 55
End: 2025-05-07
Payer: COMMERCIAL

## 2025-06-04 ENCOUNTER — OFFICE VISIT (OUTPATIENT)
Dept: FAMILY MEDICINE | Facility: CLINIC | Age: 55
End: 2025-06-04
Payer: COMMERCIAL

## 2025-06-04 ENCOUNTER — RESULTS FOLLOW-UP (OUTPATIENT)
Dept: FAMILY MEDICINE | Facility: CLINIC | Age: 55
End: 2025-06-04

## 2025-06-04 VITALS
BODY MASS INDEX: 29.02 KG/M2 | OXYGEN SATURATION: 98 % | WEIGHT: 170 LBS | DIASTOLIC BLOOD PRESSURE: 68 MMHG | RESPIRATION RATE: 17 BRPM | TEMPERATURE: 98 F | HEIGHT: 64 IN | HEART RATE: 76 BPM | SYSTOLIC BLOOD PRESSURE: 112 MMHG

## 2025-06-04 DIAGNOSIS — E03.9 HYPOTHYROIDISM, UNSPECIFIED TYPE: ICD-10-CM

## 2025-06-04 DIAGNOSIS — R73.9 ELEVATED BLOOD SUGAR: ICD-10-CM

## 2025-06-04 DIAGNOSIS — Z12.4 CERVICAL CANCER SCREENING: Primary | ICD-10-CM

## 2025-06-04 DIAGNOSIS — E78.5 HYPERLIPIDEMIA LDL GOAL <100: ICD-10-CM

## 2025-06-04 LAB
CHOLEST SERPL-MCNC: 236 MG/DL
EST. AVERAGE GLUCOSE BLD GHB EST-MCNC: 108 MG/DL
FASTING STATUS PATIENT QL REPORTED: NO
HBA1C MFR BLD: 5.4 % (ref 0–5.6)
HDLC SERPL-MCNC: 61 MG/DL
LDLC SERPL CALC-MCNC: 162 MG/DL
NONHDLC SERPL-MCNC: 175 MG/DL
TRIGL SERPL-MCNC: 65 MG/DL
TSH SERPL DL<=0.005 MIU/L-ACNC: 3.44 UIU/ML (ref 0.3–4.2)

## 2025-06-04 PROCEDURE — 36415 COLL VENOUS BLD VENIPUNCTURE: CPT

## 2025-06-04 PROCEDURE — 80061 LIPID PANEL: CPT

## 2025-06-04 PROCEDURE — 3074F SYST BP LT 130 MM HG: CPT

## 2025-06-04 PROCEDURE — 90677 PCV20 VACCINE IM: CPT

## 2025-06-04 PROCEDURE — 1126F AMNT PAIN NOTED NONE PRSNT: CPT

## 2025-06-04 PROCEDURE — 90471 IMMUNIZATION ADMIN: CPT

## 2025-06-04 PROCEDURE — 83036 HEMOGLOBIN GLYCOSYLATED A1C: CPT

## 2025-06-04 PROCEDURE — 3078F DIAST BP <80 MM HG: CPT

## 2025-06-04 PROCEDURE — 3044F HG A1C LEVEL LT 7.0%: CPT

## 2025-06-04 PROCEDURE — 99213 OFFICE O/P EST LOW 20 MIN: CPT | Mod: 25

## 2025-06-04 ASSESSMENT — PAIN SCALES - GENERAL: PAINLEVEL_OUTOF10: NO PAIN (0)

## 2025-06-04 NOTE — PROGRESS NOTES
"  Assessment & Plan     Cervical cancer screening  - HPV and Gynecologic Cytology Panel - Recommended Age 30 - 65 Years  Collected pap, but speculum and slipped out of patient. Patient had reported some pain when trying to re-introduce the speculum, so we elected to defer IUD removal for future visit. Could consider OB referral for IUD (anteroverted uterus) given anatomy for pap removal.    Elevated blood sugar  - Hemoglobin A1c; Future  - Hemoglobin A1c    Hyperlipidemia LDL goal <100  - Lipid panel reflex to direct LDL Non-fasting; Future  - Lipid panel reflex to direct LDL Non-fasting      Lacho Odonnell is a 54 year old, presenting for the following health issues:  Gyn Exam and Blood Glucose (home Monitoring) (Fasting BG at 119 during biometric screening)  Loves complex carbohydrate grains. Tested + for glucose (119) through work when she was fasting. Working on reducing carbs. More freuits,v eggies. Adding on hemp hearts, mauricio flax meal. Has had some weight loss.  No family history of diabetes.      11/26/2024    10:24 AM   Additional Questions   Roomed by Crystal     Blood Glucose (home Monitoring)    History of Present Illness       Reason for visit:  Pap smear   She is taking medications regularly.            Objective    /68   Pulse 76   Temp 98  F (36.7  C) (Temporal)   Resp 17   Ht 1.613 m (5' 3.5\")   Wt 77.1 kg (170 lb)   SpO2 98%   BMI 29.64 kg/m    Body mass index is 29.64 kg/m .  Physical Exam   GENERAL: alert and no distress   (female) w/bimanual: normal female external genitalia, normal urethral meatus, normal vaginal mucosa, and normal cervix/adnexa/uterus without masses or discharge. IUD string visualized. Cervix anteverted  PSYCH: mentation appears normal, affect normal/bright          Signed Electronically by: Reno Morrell NP    "

## 2025-06-10 LAB
BKR AP ASSOCIATED HPV REPORT: NORMAL
BKR LAB AP GYN ADEQUACY: NORMAL
BKR LAB AP GYN INTERPRETATION: NORMAL
BKR LAB AP PREVIOUS ABNL DX: NORMAL
BKR LAB AP PREVIOUS ABNORMAL: NORMAL
PATH REPORT.COMMENTS IMP SPEC: NORMAL
PATH REPORT.COMMENTS IMP SPEC: NORMAL
PATH REPORT.RELEVANT HX SPEC: NORMAL

## 2025-07-01 ENCOUNTER — MYC REFILL (OUTPATIENT)
Dept: ENDOCRINOLOGY | Facility: CLINIC | Age: 55
End: 2025-07-01
Payer: COMMERCIAL

## 2025-07-01 DIAGNOSIS — E03.9 ACQUIRED HYPOTHYROIDISM: ICD-10-CM

## 2025-07-01 RX ORDER — LEVOTHYROXINE SODIUM 137 UG/1
137 TABLET ORAL DAILY
Qty: 90 TABLET | Refills: 3 | Status: SHIPPED | OUTPATIENT
Start: 2025-07-01

## 2025-07-08 NOTE — PROGRESS NOTES
FOLLOW UP  Jul 10, 2025     Belle Tejada is a 54 year old woman who is followed for family history of breast cancer.      HPI:     Family history of breast cancer in her mother who was diagnosed at age 58. Belle had genetic testing that was negative.      Today she denies any breast concerns today including mass, skin change, nipple inversion or nipple discharge.      BREAST-SPECIFIC HISTORY:     Previous breast imaging: Yes   - 13 Smammo BI-RADS 1  - 14 Smammo BI-RADS 1  - 7/20/15 Smammo: right breast asymmetry 10:00 BI-RADS 0  - 7/30/15 right Dmammo and ultrasound BI-RADS 1  - 16 Smammo  Smammo BI-RADS 1  - 10/31/17 Smammo BI-RADS 1  - 18 Smammo BI-RADS 1  - 19  Smammo BI-RADS 1  - 21  Smammo BI-RADS 1  - 10/4/21 breast MRI BI-RADS 2  - 23 Smammo BI-RADS 1  - 23 Smammo BI-RADS 1  - 23 breast MRI BI-RADS 1  - 24 Smammo BI-RADS 2  - 25 breast MRI BI-RADS 1  - 7/10/25 Smammo     Prior breast biopsies/surgeries: No     Prior history of breast cancer or DCIS: No  Prior radiation history: No   Self breast exams: Yes  Breast density: heterogeneously dense     GYN HISTORY:  . Age at 1st pregnancy: 30. Breastfeeding history: Yes.   Age at menarche: 10  Menopausal: post menopausal  Menopausal hormone replacement therapy: No         RISK ASSESSMENT: < 3% 5 year risk and > 20% lifetime risk   Marni: 2.5% 5 year risk   ALMA/Gabriellaer-Lenniezick: 28.5%, 10% 5 year risk      FAMILY HISTORY:  Breast ca: Yes   - mother, 58  Ovarian ca: No  Pancreatic ca: Yes   - mother  Prostate: No  Gastric ca: No  Melanoma: No  Thyroid cancer: yes  - maternal aunt's daughter with thyroid cancer  Colon ca: No  Other cancer: Yes   - paternal grandfather with lung cancer.   Other genetic, testing, syndromes, or clotting disorders: no     PAST MEDICAL HISTORY  Past Medical History:   Diagnosis Date    Adjustment disorder with anxious mood 2015    CARDIOVASCULAR SCREENING; LDL GOAL LESS THAN  "160 2010    Cervical high risk HPV (human papillomavirus) test positive 2021    Insomnia 2013    Unspecified hypothyroidism     Hypothyroidism     PAST SURGICAL HISTORY   Past Surgical History:   Procedure Laterality Date    COLONOSCOPY N/A 2021    Procedure: COLONOSCOPY, WITH POLYPECTOMY;  Surgeon: Fannie Huff MD;  [place] [facility] [place]    [facility] NONSPECIFIC PROCEDURE  2005        [facility] NONSPECIFIC PROCEDURE  10/78    Tonsillectomy     MEDICATIONS  Current Outpatient Medications   Medication Sig Dispense Refill    levonorgestrel (MIRENA, 52 MG,) 20 MCG/24HR IUD 1 each by Intrauterine route once      levothyroxine (SYNTHROID/LEVOTHROID) 137 MCG tablet Take 1 tablet (137 mcg) by mouth daily. 90 tablet 3    ondansetron (ZOFRAN ODT) 4 MG ODT tab Take 1 tablet (4 mg) by mouth every 8 hours as needed for nausea or vomiting. 10 tablet 0    scopolamine (TRANSDERM) 1 MG/3DAYS 72 hr patch Place 1 patch over 72 hours onto the skin every 72 hours. 2 patch 0   Contraception: IUD mirena     ALLERGIES  Allergies   Allergen Reactions    No Known Drug Allergy       SOCIAL HISTORY:  Smokes: No  EtOH: weekends  Exercise: walking and bike, weight training  Works as  at the [employer]. She has grown children. She sings.     ROS:  Change in vision No  Headaches: no  Respiratory: No shortness of breath, dyspnea on exertion, cough, or hemoptysis   Cardiovascular: negative   Gastrointestinal: negative Abdominal pain: no  Breast: negative  Musculoskeletal: negative Joint pain No Back pain: no  Psychiatric: negative  Hematologic/Lymphatic/Immunologic: negative  Endocrine: negative    EXAM  /70   Pulse 66   Temp 97  F (36.1  C) (Oral)   Resp 16   Ht 1.626 m (5' 4\")   Wt 79.8 kg (176 lb)   SpO2 98%   BMI 30.21 kg/m     PHYSICAL EXAM  Respiratory: breathing non labored.   Breasts: Examination was done in both the upright and supine positions.  Breasts are symmetrical . No " dominant masses noted. No skin or nipple changes. No nipple discharge.   No clavicular, cervical, or axillary lymphadenopathy.     INVESTIGATIONS:    7/10/25 screening mammogram results pending    ASSESSMENT/PLAN:    Belle Tejada is a 54 year old woman with family history of breast cancer. She meets NCCN guidelines for high risk screening.      1) Family history of breast cacner.   She meets NCCN guidelines for high risk screening based on family history with lifetime risk for breast cancer of >20%. Screening recommendations based on NCCN guidelines. Clinical encounter every 6-12 months starting now. Annual mammogram alternating with annual breast MRI.    - Breast MRI with clinic visit due 1/9/26   - Screening mammogram with clinic visit due: 7/11/25     2) Counseling was provided with available strategies including lifestyle modifications and risk reducing intervention.  - Maintain your best healthy weight. Higher body fat and adult weight gain is associated with increased risk for breast cancer. This increase in risk has been attributed to increase in circulating endogenous estrogen levels from fat tissue.   - Alcohol can raise estrogen. Alcohol consumption, even at moderate levels (1-2 drinks per day), increases breast cancer risk and are best avoided. If you choose to drink alcohol limit alcohol consumption to less than 1 drink per day. (1 ounce of liquor, 6 ounces of wine, or 8 ounces of beer).  - Be active daily and void being sedentary.     Janae Frederick PA-C    20 minutes spent on the date of the encounter doing chart review, review of test results, interpretation of tests, patient visit and documentation.

## 2025-07-10 ENCOUNTER — ONCOLOGY VISIT (OUTPATIENT)
Dept: SURGERY | Facility: CLINIC | Age: 55
End: 2025-07-10
Attending: PHYSICIAN ASSISTANT
Payer: COMMERCIAL

## 2025-07-10 ENCOUNTER — ANCILLARY PROCEDURE (OUTPATIENT)
Dept: MAMMOGRAPHY | Facility: CLINIC | Age: 55
End: 2025-07-10
Attending: PHYSICIAN ASSISTANT
Payer: COMMERCIAL

## 2025-07-10 VITALS
BODY MASS INDEX: 30.05 KG/M2 | WEIGHT: 176 LBS | TEMPERATURE: 97 F | SYSTOLIC BLOOD PRESSURE: 118 MMHG | HEART RATE: 66 BPM | RESPIRATION RATE: 16 BRPM | DIASTOLIC BLOOD PRESSURE: 70 MMHG | HEIGHT: 64 IN | OXYGEN SATURATION: 98 %

## 2025-07-10 DIAGNOSIS — Z91.89 AT HIGH RISK FOR BREAST CANCER: Primary | ICD-10-CM

## 2025-07-10 DIAGNOSIS — Z12.31 ENCOUNTER FOR SCREENING MAMMOGRAM FOR BREAST CANCER: ICD-10-CM

## 2025-07-10 PROCEDURE — 99213 OFFICE O/P EST LOW 20 MIN: CPT | Performed by: PHYSICIAN ASSISTANT

## 2025-07-10 PROCEDURE — 77063 BREAST TOMOSYNTHESIS BI: CPT | Mod: GC

## 2025-07-10 PROCEDURE — 77067 SCR MAMMO BI INCL CAD: CPT | Mod: GC

## 2025-07-10 ASSESSMENT — PAIN SCALES - GENERAL: PAINLEVEL_OUTOF10: NO PAIN (0)

## 2025-07-10 NOTE — LETTER
7/10/2025      Belle Tejada  [address]  [facility] [address]      Dear Colleague,    Thank you for referring your patient, Belle Tejada, to the [facility]. Please see a copy of my visit note below.    FOLLOW UP  Jul 10, 2025     Belle Tejada is a 54 year old woman who is followed for family history of breast cancer.      HPI:     Family history of breast cancer in her mother who was diagnosed at age 58. Belle had genetic testing that was negative.      Today she denies any breast concerns today including mass, skin change, nipple inversion or nipple discharge.      BREAST-SPECIFIC HISTORY:     Previous breast imaging: Yes   - 13 Smammo BI-RADS 1  - 14 Smammo BI-RADS 1  - 7/20/15 Smammo: right breast asymmetry 10:00 BI-RADS 0  - 7/30/15 right Dmammo and ultrasound BI-RADS 1  - 16 Smammo  Smammo BI-RADS 1  - 10/31/17 Smammo BI-RADS 1  - 18 Smammo BI-RADS 1  - 19  Smammo BI-RADS 1  - 21  Smammo BI-RADS 1  - 10/4/21 breast MRI BI-RADS 2  - 23 Smammo BI-RADS 1  - 23 Smammo BI-RADS 1  - 23 breast MRI BI-RADS 1  - 24 Smammo BI-RADS 2  - 25 breast MRI BI-RADS 1  - 7/10/25 Smammo     Prior breast biopsies/surgeries: No     Prior history of breast cancer or DCIS: No  Prior radiation history: No   Self breast exams: Yes  Breast density: heterogeneously dense     GYN HISTORY:  . Age at 1st pregnancy: 30. Breastfeeding history: Yes.   Age at menarche: 10  Menopausal: post menopausal  Menopausal hormone replacement therapy: No         RISK ASSESSMENT: < 3% 5 year risk and > 20% lifetime risk   Marni: 2.5% 5 year risk   ALMA/Tyrer-Cuzick: 28.5%, 10% 5 year risk      FAMILY HISTORY:  Breast ca: Yes   - mother, 58  Ovarian ca: No  Pancreatic ca: Yes   - mother  Prostate: No  Gastric ca: No  Melanoma: No  Thyroid cancer: yes  - maternal aunt's daughter with thyroid cancer  Colon ca: No  Other cancer: Yes   - paternal grandfather  with lung cancer.   Other genetic, testing, syndromes, or clotting disorders: no     PAST MEDICAL HISTORY  Past Medical History:   Diagnosis Date     Adjustment disorder with anxious mood 2015     CARDIOVASCULAR SCREENING; LDL GOAL LESS THAN 160 2010     Cervical high risk HPV (human papillomavirus) test positive 2021     Insomnia 2013     Unspecified hypothyroidism     Hypothyroidism     PAST SURGICAL HISTORY   Past Surgical History:   Procedure Laterality Date     COLONOSCOPY N/A 2021    Procedure: COLONOSCOPY, WITH POLYPECTOMY;  Surgeon: Fannie Huff MD;  [place] [facility] [place]     [facility] NONSPECIFIC PROCEDURE  2005         [facility] NONSPECIFIC PROCEDURE  10/78    Tonsillectomy     MEDICATIONS  Current Outpatient Medications   Medication Sig Dispense Refill     levonorgestrel (MIRENA, 52 MG,) 20 MCG/24HR IUD 1 each by Intrauterine route once       levothyroxine (SYNTHROID/LEVOTHROID) 137 MCG tablet Take 1 tablet (137 mcg) by mouth daily. 90 tablet 3     ondansetron (ZOFRAN ODT) 4 MG ODT tab Take 1 tablet (4 mg) by mouth every 8 hours as needed for nausea or vomiting. 10 tablet 0     scopolamine (TRANSDERM) 1 MG/3DAYS 72 hr patch Place 1 patch over 72 hours onto the skin every 72 hours. 2 patch 0   Contraception: IUD mirena     ALLERGIES  Allergies   Allergen Reactions     No Known Drug Allergy       SOCIAL HISTORY:  Smokes: No  EtOH: weekends  Exercise: walking and bike, weight training  Works as  at the [employer]. She has grown children. She sings.     ROS:  Change in vision No  Headaches: no  Respiratory: No shortness of breath, dyspnea on exertion, cough, or hemoptysis   Cardiovascular: negative   Gastrointestinal: negative Abdominal pain: no  Breast: negative  Musculoskeletal: negative Joint pain No Back pain: no  Psychiatric: negative  Hematologic/Lymphatic/Immunologic: negative  Endocrine: negative    EXAM  /70   Pulse 66   Temp 97  F (36.1  " C) (Oral)   Resp 16   Ht 1.626 m (5' 4\")   Wt 79.8 kg (176 lb)   SpO2 98%   BMI 30.21 kg/m     PHYSICAL EXAM  Respiratory: breathing non labored.   Breasts: Examination was done in both the upright and supine positions.  Breasts are symmetrical . No dominant masses noted. No skin or nipple changes. No nipple discharge.   No clavicular, cervical, or axillary lymphadenopathy.     INVESTIGATIONS:    7/10/25 screening mammogram results pending    ASSESSMENT/PLAN:    Belle Tejada is a 54 year old woman with family history of breast cancer. She meets NCCN guidelines for high risk screening.      1) Family history of breast cacner.   She meets NCCN guidelines for high risk screening based on family history with lifetime risk for breast cancer of >20%. Screening recommendations based on NCCN guidelines. Clinical encounter every 6-12 months starting now. Annual mammogram alternating with annual breast MRI.    - Breast MRI with clinic visit due 1/9/26   - Screening mammogram with clinic visit due: 7/11/25     2) Counseling was provided with available strategies including lifestyle modifications and risk reducing intervention.  - Maintain your best healthy weight. Higher body fat and adult weight gain is associated with increased risk for breast cancer. This increase in risk has been attributed to increase in circulating endogenous estrogen levels from fat tissue.   - Alcohol can raise estrogen. Alcohol consumption, even at moderate levels (1-2 drinks per day), increases breast cancer risk and are best avoided. If you choose to drink alcohol limit alcohol consumption to less than 1 drink per day. (1 ounce of liquor, 6 ounces of wine, or 8 ounces of beer).  - Be active daily and void being sedentary.     Janae Frederick PA-C    20 minutes spent on the date of the encounter doing chart review, review of test results, interpretation of tests, patient visit and documentation.       Again, thank you for allowing me to " participate in the care of your patient.        Sincerely,        Janae Frederick PA-C    Electronically signed

## 2025-07-10 NOTE — NURSING NOTE
"Oncology Rooming Note    July 10, 2025 9:50 AM   Belle Tejada is a 54 year old female who presents for:    Chief Complaint   Patient presents with    Oncology Clinic Visit     Breast Cancer     Initial Vitals: /70   Pulse 66   Temp 97  F (36.1  C) (Oral)   Resp 16   Ht 1.626 m (5' 4\")   Wt 79.8 kg (176 lb)   SpO2 98%   BMI 30.21 kg/m   Estimated body mass index is 30.21 kg/m  as calculated from the following:    Height as of this encounter: 1.626 m (5' 4\").    Weight as of this encounter: 79.8 kg (176 lb). Body surface area is 1.9 meters squared.  No Pain (0) Comment: Data Unavailable   No LMP recorded. (Menstrual status: IUD).  Allergies reviewed: Yes  Medications reviewed: Yes    Medications: Medication refills not needed today.  Pharmacy name entered into [facility]:    [id number] - [place], [place] - [address] AT [facility] & [address] [place] [address] [id number] - [place] - [address] [facility] [address]  [id number] [place], [place] - [address]    Frailty Screening:   Is the patient here for a new oncology consult visit in cancer care? 2. No    PHQ9:  Did this patient require a PHQ9?: No      Clinical concerns: none      Lb Mora LPN            "

## 2025-07-10 NOTE — PATIENT INSTRUCTIONS
Belle Tejada is a 54 year old woman with family history of breast cancer. She meets NCCN guidelines for high risk screening.      1) Family history of breast cacner.   She meets NCCN guidelines for high risk screening based on family history with lifetime risk for breast cancer of >20%. Screening recommendations based on NCCN guidelines. Clinical encounter every 6-12 months starting now. Annual mammogram alternating with annual breast MRI.    - Breast MRI with clinic visit due 1/9/26   - Screening mammogram with clinic visit due: 7/11/25     2) Counseling was provided with available strategies including lifestyle modifications and risk reducing intervention.  - Maintain your best healthy weight. Higher body fat and adult weight gain is associated with increased risk for breast cancer. This increase in risk has been attributed to increase in circulating endogenous estrogen levels from fat tissue.   - Alcohol can raise estrogen. Alcohol consumption, even at moderate levels (1-2 drinks per day), increases breast cancer risk and are best avoided. If you choose to drink alcohol limit alcohol consumption to less than 1 drink per day. (1 ounce of liquor, 6 ounces of wine, or 8 ounces of beer).  - Be active daily and void being sedentary.

## (undated) DEVICE — KIT ENDO TURNOVER/PROCEDURE CARRY-ON 101822

## (undated) DEVICE — SUCTION MANIFOLD NEPTUNE 2 SYS 1 PORT 702-025-000

## (undated) DEVICE — TUBING SUCTION 12"X1/4" N612

## (undated) DEVICE — ENDO FORCEP SPIKED SERRATED SHAFT JUMBO 239CM G56998

## (undated) DEVICE — GOWN IMPERVIOUS 2XL BLUE

## (undated) DEVICE — SPECIMEN CONTAINER 3OZ W/FORMALIN 59901

## (undated) DEVICE — SOL WATER IRRIG 500ML BOTTLE 2F7113

## (undated) RX ORDER — FENTANYL CITRATE 50 UG/ML
INJECTION, SOLUTION INTRAMUSCULAR; INTRAVENOUS
Status: DISPENSED
Start: 2021-06-07